# Patient Record
Sex: MALE | Race: WHITE | Employment: OTHER | URBAN - NONMETROPOLITAN AREA
[De-identification: names, ages, dates, MRNs, and addresses within clinical notes are randomized per-mention and may not be internally consistent; named-entity substitution may affect disease eponyms.]

---

## 2019-05-13 ENCOUNTER — OFFICE VISIT (OUTPATIENT)
Dept: CARDIOLOGY CLINIC | Age: 59
End: 2019-05-13
Payer: COMMERCIAL

## 2019-05-13 VITALS
BODY MASS INDEX: 42.66 KG/M2 | DIASTOLIC BLOOD PRESSURE: 90 MMHG | WEIGHT: 315 LBS | HEART RATE: 88 BPM | SYSTOLIC BLOOD PRESSURE: 140 MMHG | HEIGHT: 72 IN

## 2019-05-13 DIAGNOSIS — R94.39 ABNORMAL STRESS TEST: ICD-10-CM

## 2019-05-13 DIAGNOSIS — R06.09 DYSPNEA ON EXERTION: Primary | ICD-10-CM

## 2019-05-13 DIAGNOSIS — I10 ESSENTIAL HYPERTENSION: ICD-10-CM

## 2019-05-13 PROCEDURE — 3017F COLORECTAL CA SCREEN DOC REV: CPT | Performed by: NUCLEAR MEDICINE

## 2019-05-13 PROCEDURE — G8428 CUR MEDS NOT DOCUMENT: HCPCS | Performed by: NUCLEAR MEDICINE

## 2019-05-13 PROCEDURE — G8417 CALC BMI ABV UP PARAM F/U: HCPCS | Performed by: NUCLEAR MEDICINE

## 2019-05-13 PROCEDURE — 4004F PT TOBACCO SCREEN RCVD TLK: CPT | Performed by: NUCLEAR MEDICINE

## 2019-05-13 PROCEDURE — 99204 OFFICE O/P NEW MOD 45 MIN: CPT | Performed by: NUCLEAR MEDICINE

## 2019-05-13 ASSESSMENT — ENCOUNTER SYMPTOMS
PHOTOPHOBIA: 0
ANAL BLEEDING: 0
VOMITING: 0
DIARRHEA: 0
CHEST TIGHTNESS: 0
BLOOD IN STOOL: 0
BACK PAIN: 0
COLOR CHANGE: 0
RECTAL PAIN: 0
ABDOMINAL DISTENTION: 0
NAUSEA: 0
ABDOMINAL PAIN: 0
SHORTNESS OF BREATH: 1
CONSTIPATION: 0

## 2019-05-13 NOTE — PROGRESS NOTES
New pt here for abn stress test   Pt states he gets some chest pain, does not radiate   Also gets some SOB more on exertion
guarding. No hernia. Musculoskeletal: He exhibits no edema. Lymphadenopathy:     He has no cervical adenopathy. Neurological: He is alert and oriented to person, place, and time. He displays normal reflexes. No cranial nerve deficit or sensory deficit. He exhibits normal muscle tone. Coordination normal.   Skin: He is not diaphoretic. No erythema. Psychiatric: He has a normal mood and affect. His behavior is normal.     BP (!) 140/90   Pulse 88   Ht 6' (1.829 m)   Wt (!) 358 lb (162.4 kg)   BMI 48.55 kg/m²     Assessment:      Diagnosis Orders   1. Dyspnea on exertion     2. Abnormal stress test     3. Essential hypertension     possible CAD  Vs likely sleep apnea   Obesity: extensive discussion was made with the patient regarding diet and weight control including specific food items to avoid and cut down  BP needs treated     Plan:  No follow-ups on file. Discussed  Add beta blockers  ASA  Cardiac cathterizaion, risks and benefits discussed with the patient and family at length. Patient was agreeable. Refer for sleep apnea evaluation   Continue risk factor modification and medical management  Thank you for allowing me to participate in the care of your patient. Please don't hesitate to contact me regarding any further issues related to the patient care    Orders Placed:  No orders of the defined types were placed in this encounter. Medications Prescribed:  No orders of the defined types were placed in this encounter. Discussed use, benefit, and side effects of prescribed medications. All patient questions answered. Pt voicedunderstanding. Instructed to continue current medications, diet and exercise. Continue risk factor modification and medical management. Patient agreed with treatment plan. Follow up as directed.     Electronically signedby Zahraa Gentile MD on 5/13/2019 at 12:19 PM

## 2019-05-22 ENCOUNTER — PREP FOR PROCEDURE (OUTPATIENT)
Dept: CARDIOLOGY | Age: 59
End: 2019-05-22

## 2019-05-22 RX ORDER — SODIUM CHLORIDE 0.9 % (FLUSH) 0.9 %
10 SYRINGE (ML) INJECTION EVERY 12 HOURS SCHEDULED
Status: CANCELLED | OUTPATIENT
Start: 2019-05-22

## 2019-05-22 RX ORDER — NITROGLYCERIN 0.4 MG/1
0.4 TABLET SUBLINGUAL EVERY 5 MIN PRN
Status: CANCELLED | OUTPATIENT
Start: 2019-05-22

## 2019-05-22 RX ORDER — SODIUM CHLORIDE 0.9 % (FLUSH) 0.9 %
10 SYRINGE (ML) INJECTION PRN
Status: CANCELLED | OUTPATIENT
Start: 2019-05-22

## 2019-05-22 RX ORDER — DIPHENHYDRAMINE HYDROCHLORIDE 50 MG/ML
50 INJECTION INTRAMUSCULAR; INTRAVENOUS ONCE
Status: CANCELLED | OUTPATIENT
Start: 2019-05-22 | End: 2019-05-22

## 2019-05-22 RX ORDER — SODIUM CHLORIDE 9 MG/ML
INJECTION, SOLUTION INTRAVENOUS CONTINUOUS
Status: CANCELLED | OUTPATIENT
Start: 2019-05-22

## 2019-05-22 RX ORDER — ASPIRIN 325 MG
325 TABLET ORAL ONCE
Status: CANCELLED | OUTPATIENT
Start: 2019-05-22 | End: 2019-05-22

## 2019-05-23 ENCOUNTER — HOSPITAL ENCOUNTER (OUTPATIENT)
Dept: INPATIENT UNIT | Age: 59
Discharge: HOME | End: 2019-05-23
Attending: NUCLEAR MEDICINE | Admitting: NUCLEAR MEDICINE
Payer: COMMERCIAL

## 2019-05-23 VITALS
HEART RATE: 76 BPM | HEIGHT: 72 IN | SYSTOLIC BLOOD PRESSURE: 133 MMHG | BODY MASS INDEX: 42.66 KG/M2 | TEMPERATURE: 98 F | OXYGEN SATURATION: 94 % | DIASTOLIC BLOOD PRESSURE: 81 MMHG | RESPIRATION RATE: 16 BRPM | WEIGHT: 315 LBS

## 2019-05-23 LAB
ABO: NORMAL
ANION GAP SERPL CALCULATED.3IONS-SCNC: 10 MEQ/L (ref 8–16)
ANTIBODY SCREEN: NORMAL
APTT: 29 SECONDS (ref 22–38)
BUN BLDV-MCNC: 23 MG/DL (ref 7–22)
CALCIUM SERPL-MCNC: 9.8 MG/DL (ref 8.5–10.5)
CHLORIDE BLD-SCNC: 98 MEQ/L (ref 98–111)
CO2: 28 MEQ/L (ref 23–33)
CREAT SERPL-MCNC: 0.8 MG/DL (ref 0.4–1.2)
EKG ATRIAL RATE: 64 BPM
EKG P AXIS: 25 DEGREES
EKG P-R INTERVAL: 230 MS
EKG Q-T INTERVAL: 404 MS
EKG QRS DURATION: 112 MS
EKG QTC CALCULATION (BAZETT): 416 MS
EKG R AXIS: -31 DEGREES
EKG T AXIS: 19 DEGREES
EKG VENTRICULAR RATE: 64 BPM
ERYTHROCYTE [DISTWIDTH] IN BLOOD BY AUTOMATED COUNT: 12.7 % (ref 11.5–14.5)
ERYTHROCYTE [DISTWIDTH] IN BLOOD BY AUTOMATED COUNT: 39.8 FL (ref 35–45)
GFR SERPL CREATININE-BSD FRML MDRD: > 90 ML/MIN/1.73M2
GLUCOSE BLD-MCNC: 237 MG/DL (ref 70–108)
GLUCOSE BLD-MCNC: 322 MG/DL (ref 70–108)
HCT VFR BLD CALC: 48.7 % (ref 42–52)
HEMOGLOBIN: 16.1 GM/DL (ref 14–18)
INR BLD: 0.95 (ref 0.85–1.13)
MCH RBC QN AUTO: 28.6 PG (ref 26–33)
MCHC RBC AUTO-ENTMCNC: 33.1 GM/DL (ref 32.2–35.5)
MCV RBC AUTO: 86.5 FL (ref 80–94)
PLATELET # BLD: 175 THOU/MM3 (ref 130–400)
PMV BLD AUTO: 10.5 FL (ref 9.4–12.4)
POTASSIUM REFLEX MAGNESIUM: 5.7 MEQ/L (ref 3.5–5.2)
RBC # BLD: 5.63 MILL/MM3 (ref 4.7–6.1)
RH FACTOR: NORMAL
SODIUM BLD-SCNC: 136 MEQ/L (ref 135–145)
WBC # BLD: 11.1 THOU/MM3 (ref 4.8–10.8)

## 2019-05-23 PROCEDURE — 93458 L HRT ARTERY/VENTRICLE ANGIO: CPT | Performed by: NUCLEAR MEDICINE

## 2019-05-23 PROCEDURE — 6360000004 HC RX CONTRAST MEDICATION: Performed by: NUCLEAR MEDICINE

## 2019-05-23 PROCEDURE — 36415 COLL VENOUS BLD VENIPUNCTURE: CPT

## 2019-05-23 PROCEDURE — 85610 PROTHROMBIN TIME: CPT

## 2019-05-23 PROCEDURE — 85027 COMPLETE CBC AUTOMATED: CPT

## 2019-05-23 PROCEDURE — 86850 RBC ANTIBODY SCREEN: CPT

## 2019-05-23 PROCEDURE — 6360000002 HC RX W HCPCS

## 2019-05-23 PROCEDURE — 6370000000 HC RX 637 (ALT 250 FOR IP): Performed by: NUCLEAR MEDICINE

## 2019-05-23 PROCEDURE — 93005 ELECTROCARDIOGRAM TRACING: CPT | Performed by: NURSE PRACTITIONER

## 2019-05-23 PROCEDURE — 85730 THROMBOPLASTIN TIME PARTIAL: CPT

## 2019-05-23 PROCEDURE — 2709999900 HC NON-CHARGEABLE SUPPLY

## 2019-05-23 PROCEDURE — 2580000003 HC RX 258: Performed by: NURSE PRACTITIONER

## 2019-05-23 PROCEDURE — 86901 BLOOD TYPING SEROLOGIC RH(D): CPT

## 2019-05-23 PROCEDURE — C1894 INTRO/SHEATH, NON-LASER: HCPCS

## 2019-05-23 PROCEDURE — 82948 REAGENT STRIP/BLOOD GLUCOSE: CPT

## 2019-05-23 PROCEDURE — 86900 BLOOD TYPING SEROLOGIC ABO: CPT

## 2019-05-23 PROCEDURE — C1769 GUIDE WIRE: HCPCS

## 2019-05-23 PROCEDURE — 80048 BASIC METABOLIC PNL TOTAL CA: CPT

## 2019-05-23 PROCEDURE — 2500000003 HC RX 250 WO HCPCS

## 2019-05-23 PROCEDURE — 93010 ELECTROCARDIOGRAM REPORT: CPT | Performed by: INTERNAL MEDICINE

## 2019-05-23 RX ORDER — SODIUM CHLORIDE 9 MG/ML
INJECTION, SOLUTION INTRAVENOUS CONTINUOUS
Status: DISCONTINUED | OUTPATIENT
Start: 2019-05-23 | End: 2019-05-24 | Stop reason: HOSPADM

## 2019-05-23 RX ORDER — SODIUM CHLORIDE 0.9 % (FLUSH) 0.9 %
10 SYRINGE (ML) INJECTION PRN
Status: DISCONTINUED | OUTPATIENT
Start: 2019-05-23 | End: 2019-05-23 | Stop reason: SDUPTHER

## 2019-05-23 RX ORDER — SODIUM CHLORIDE 0.9 % (FLUSH) 0.9 %
10 SYRINGE (ML) INJECTION PRN
Status: DISCONTINUED | OUTPATIENT
Start: 2019-05-23 | End: 2019-05-24 | Stop reason: HOSPADM

## 2019-05-23 RX ORDER — DIPHENHYDRAMINE HYDROCHLORIDE 50 MG/ML
50 INJECTION INTRAMUSCULAR; INTRAVENOUS ONCE
Status: DISCONTINUED | OUTPATIENT
Start: 2019-05-23 | End: 2019-05-23

## 2019-05-23 RX ORDER — ATROPINE SULFATE 0.4 MG/ML
0.5 AMPUL (ML) INJECTION
Status: DISCONTINUED | OUTPATIENT
Start: 2019-05-23 | End: 2019-05-23 | Stop reason: HOSPADM

## 2019-05-23 RX ORDER — SODIUM CHLORIDE 9 MG/ML
INJECTION, SOLUTION INTRAVENOUS CONTINUOUS
Status: DISCONTINUED | OUTPATIENT
Start: 2019-05-23 | End: 2019-05-23 | Stop reason: SDUPTHER

## 2019-05-23 RX ORDER — SODIUM CHLORIDE 0.9 % (FLUSH) 0.9 %
10 SYRINGE (ML) INJECTION EVERY 12 HOURS SCHEDULED
Status: DISCONTINUED | OUTPATIENT
Start: 2019-05-23 | End: 2019-05-23 | Stop reason: SDUPTHER

## 2019-05-23 RX ORDER — NITROGLYCERIN 0.4 MG/1
0.4 TABLET SUBLINGUAL EVERY 5 MIN PRN
Status: DISCONTINUED | OUTPATIENT
Start: 2019-05-23 | End: 2019-05-24 | Stop reason: HOSPADM

## 2019-05-23 RX ORDER — ASPIRIN 325 MG
325 TABLET ORAL ONCE
Status: DISCONTINUED | OUTPATIENT
Start: 2019-05-23 | End: 2019-05-24 | Stop reason: HOSPADM

## 2019-05-23 RX ORDER — SODIUM CHLORIDE 0.9 % (FLUSH) 0.9 %
10 SYRINGE (ML) INJECTION EVERY 12 HOURS SCHEDULED
Status: DISCONTINUED | OUTPATIENT
Start: 2019-05-23 | End: 2019-05-24 | Stop reason: HOSPADM

## 2019-05-23 RX ORDER — ACETAMINOPHEN 325 MG/1
650 TABLET ORAL EVERY 4 HOURS PRN
Status: DISCONTINUED | OUTPATIENT
Start: 2019-05-23 | End: 2019-05-24 | Stop reason: HOSPADM

## 2019-05-23 RX ADMIN — IOPAMIDOL 100 ML: 755 INJECTION, SOLUTION INTRAVENOUS at 16:39

## 2019-05-23 RX ADMIN — INSULIN LISPRO 4 UNITS: 100 INJECTION, SOLUTION INTRAVENOUS; SUBCUTANEOUS at 15:50

## 2019-05-23 RX ADMIN — SODIUM CHLORIDE: 9 INJECTION, SOLUTION INTRAVENOUS at 14:45

## 2019-05-23 ASSESSMENT — PAIN SCALES - GENERAL: PAINLEVEL_OUTOF10: 0

## 2019-05-23 NOTE — OP NOTE
[address] [address]  Sedation/Analgesia Post Sedation Record    Pt Name: Chad Moore  Account number: [de-identified]  MRN: [id number]  YOB: 1960  Procedure Performed By: Uziel Lawson MD [facility]  Primary Care Physician: Harjit Pena MD, MD  Date: 5/23/2019    POST-PROCEDURE    Physicians/Assistants: Uziel Lawson MD [facility]  Procedure Performed: cath    Sedation/Anesthesia: Versed/ Fentanyl and 2% xylocaine local anesthesia. Estimated Blood Loss: < 10 ml.    Specimens Removed: None       Disposition of Specimen: N/A      Complications: None Immediate      Post-procedure Diagnosis/Findings: see chart              Recommendations: medical RX              Uziel Lawson MD [facility]  Electronically signed 5/23/2019 at 5:34 PM

## 2019-05-23 NOTE — PROGRESS NOTES
Pt admitted to  2E09 ambulatory for left heart cath. Pt NPO. Patient accompanied by spouse. Vital signs obtained. Assessment and data collection initiated. Oriented to room. Policies and procedures for 2E explained   All questions answered with no further questions at this time. Fall prevention and safety precautions discussed with patient. Care plan reviewed with patient and family. Patient and family verbalize understanding of the plan of care and contribute to goal setting. 1500 Dr Naz eBnitez given all results of blood work. Glucose level reviewed with Dr Naz Benitez and the pt and wife.  States they \"will make appointment with family doctor, Luis Velázquez, for tomorrow\"      [phone] to lab per bed

## 2019-05-23 NOTE — FLOWSHEET NOTE
Received in Cath Recovery. Report received from Cath Lab. Right femoral site firm, tender to touch, manual pressure applied for 15 min per S Kallay. .   IV 1000 0.9 NS infusing at 75 ml per hour. Monitor showing NSR. See Post Cath Assessment flowsheet.   Wife in to see

## 2019-05-24 NOTE — PROCEDURES
[address] [place] [address]                            CARDIAC CATHETERIZATION    PATIENT NAME: Modesto Saab                        :        1960  MED REC NO:   [id number]                           ROOM:       0009  ACCOUNT NO:   [de-identified]                           ADMIT DATE: 2019  PROVIDER:     Vero Brandt M.D.      DATE OF PROCEDURE:  2019    CLINICAL HISTORY AND INDICATION:  This is a pleasant gentleman who has  history of multiple risk factors for coronary artery disease,  hypertension, undiagnosed diabetes noted today with a blood sugar of  300, history of abnormal stress test, which was not addressed about a  year ago with lateral ischemia, symptoms of dyspnea, dyspnea on  exertion, morbid obesity, chest heaviness, referred for cardiac  catheterization to evaluate coronary anatomy. PROCEDURES PERFORMED:  1. Left heart cath, LV gram.  2.  Coronary angiogram, right and left. 3.  Sedation with 2 mg of Versed and 50 mcg of fentanyl between 04:00  and 04:30 p.m. in my presence and under my supervision. PROCEDURE DETAILS:  Please refer to my catheterization detailed note. HEMODYNAMIC RESULTS AND LEFT VENTRICULOGRAM:  Left ventricular  end-diastolic pressure was 12 mmHg. No significant change before and  after contrast injection. No significant gradient across the aortic  valve to signify aortic stenosis. Left ventricular function was  globally within normal limit. EF 55%. CORONARY ANGIOGRAM RESULTS:  1. Left main is patent, gives rise to left anterior descending and left  circumflex artery. 2.  Left anterior descending artery is pretty patent with no significant  stenosis. 3.  Left circumflex artery is a large size codominant and is patent. 4.  Right coronary artery is codominant, patent with no significant  disease. CONCLUSION:  1. No significant coronary artery disease noted.   2.  Normal IV systolic function. 3.  No complications. RECOMMENDATIONS:  Medical treatment and risk factor modification is  recommended. The patient was advised to follow up with the family  physicians soon to evaluate his diabetes mellitus and proceed  accordingly. He would benefit from continued risk factor modification. He should be considered for evaluation for possible sleep apnea given  his morbid obesity and above-mentioned symptoms.         Jesús Silva M.D.    D: 05/23/2019 17:36:32       T: 05/23/2019 19:12:25     [email or web address]  Job#: [id number]     Doc#: [id number]    CC:

## 2019-09-09 ENCOUNTER — TELEPHONE (OUTPATIENT)
Dept: CARDIOLOGY CLINIC | Age: 59
End: 2019-09-09

## 2019-11-05 ENCOUNTER — HOSPITAL ENCOUNTER (OUTPATIENT)
Dept: INFUSION THERAPY | Age: 59
Discharge: HOME | End: 2019-11-05
Payer: COMMERCIAL

## 2019-11-05 ENCOUNTER — OFFICE VISIT (OUTPATIENT)
Dept: ONCOLOGY | Age: 59
End: 2019-11-05
Payer: COMMERCIAL

## 2019-11-05 VITALS
DIASTOLIC BLOOD PRESSURE: 89 MMHG | BODY MASS INDEX: 42.66 KG/M2 | TEMPERATURE: 97.8 F | WEIGHT: 315 LBS | HEIGHT: 72 IN | HEART RATE: 74 BPM | SYSTOLIC BLOOD PRESSURE: 142 MMHG | RESPIRATION RATE: 18 BRPM | OXYGEN SATURATION: 95 %

## 2019-11-05 DIAGNOSIS — E11.9 TYPE II DIABETES MELLITUS, WELL CONTROLLED (HCC): ICD-10-CM

## 2019-11-05 DIAGNOSIS — E66.9 OBESITY, UNSPECIFIED CLASSIFICATION, UNSPECIFIED OBESITY TYPE, UNSPECIFIED WHETHER SERIOUS COMORBIDITY PRESENT: ICD-10-CM

## 2019-11-05 DIAGNOSIS — C82.58 DIFFUSE FOLLICLE CENTER LYMPHOMA OF LYMPH NODES OF MULTIPLE REGIONS (HCC): Primary | ICD-10-CM

## 2019-11-05 DIAGNOSIS — C82.56 DIFFUSE FOLLICLE CENTER LYMPHOMA OF INTRAPELVIC LYMPH NODES (HCC): ICD-10-CM

## 2019-11-05 LAB
ALBUMIN SERPL-MCNC: 4.4 G/DL (ref 3.5–5.1)
ALP BLD-CCNC: 55 U/L (ref 38–126)
ALT SERPL-CCNC: 27 U/L (ref 11–66)
ANION GAP SERPL CALCULATED.3IONS-SCNC: 15 MEQ/L (ref 8–16)
AST SERPL-CCNC: 32 U/L (ref 5–40)
BASOPHILS # BLD: 0.9 %
BASOPHILS ABSOLUTE: 0.1 THOU/MM3 (ref 0–0.1)
BILIRUB SERPL-MCNC: 0.8 MG/DL (ref 0.3–1.2)
BILIRUBIN DIRECT: < 0.2 MG/DL (ref 0–0.3)
BUN BLDV-MCNC: 22 MG/DL (ref 7–22)
CALCIUM SERPL-MCNC: 9.7 MG/DL (ref 8.5–10.5)
CHLORIDE BLD-SCNC: 97 MEQ/L (ref 98–111)
CO2: 25 MEQ/L (ref 23–33)
CREAT SERPL-MCNC: 1 MG/DL (ref 0.4–1.2)
EOSINOPHIL # BLD: 3.8 %
EOSINOPHILS ABSOLUTE: 0.4 THOU/MM3 (ref 0–0.4)
ERYTHROCYTE [DISTWIDTH] IN BLOOD BY AUTOMATED COUNT: 13.5 % (ref 11.5–14.5)
ERYTHROCYTE [DISTWIDTH] IN BLOOD BY AUTOMATED COUNT: 43.4 FL (ref 35–45)
GFR SERPL CREATININE-BSD FRML MDRD: 76 ML/MIN/1.73M2
GLUCOSE BLD-MCNC: 192 MG/DL (ref 70–108)
HCT VFR BLD CALC: 48.6 % (ref 42–52)
HEMOGLOBIN: 15.9 GM/DL (ref 14–18)
IMMATURE GRANS (ABS): 0.02 THOU/MM3 (ref 0–0.07)
IMMATURE GRANULOCYTES: 0.2 %
LYMPHOCYTES # BLD: 25.4 %
LYMPHOCYTES ABSOLUTE: 2.4 THOU/MM3 (ref 1–4.8)
MAGNESIUM: 2.1 MG/DL (ref 1.6–2.4)
MCH RBC QN AUTO: 28.9 PG (ref 26–33)
MCHC RBC AUTO-ENTMCNC: 32.7 GM/DL (ref 32.2–35.5)
MCV RBC AUTO: 88.2 FL (ref 80–94)
MONOCYTES # BLD: 6.2 %
MONOCYTES ABSOLUTE: 0.6 THOU/MM3 (ref 0.4–1.3)
NUCLEATED RED BLOOD CELLS: 0 /100 WBC
PLATELET # BLD: 203 THOU/MM3 (ref 130–400)
PMV BLD AUTO: 10.3 FL (ref 9.4–12.4)
POTASSIUM SERPL-SCNC: 4.6 MEQ/L (ref 3.5–5.2)
RBC # BLD: 5.51 MILL/MM3 (ref 4.7–6.1)
SEDIMENTATION RATE, ERYTHROCYTE: 6 MM/HR (ref 0–10)
SEG NEUTROPHILS: 63.5 %
SEGMENTED NEUTROPHILS ABSOLUTE COUNT: 5.9 THOU/MM3 (ref 1.8–7.7)
SODIUM BLD-SCNC: 137 MEQ/L (ref 135–145)
TOTAL PROTEIN: 8.1 G/DL (ref 6.1–8)
WBC # BLD: 9.3 THOU/MM3 (ref 4.8–10.8)

## 2019-11-05 PROCEDURE — 85025 COMPLETE CBC W/AUTO DIFF WBC: CPT

## 2019-11-05 PROCEDURE — G8598 ASA/ANTIPLAT THER USED: HCPCS | Performed by: INTERNAL MEDICINE

## 2019-11-05 PROCEDURE — 83615 LACTATE (LD) (LDH) ENZYME: CPT

## 2019-11-05 PROCEDURE — 82248 BILIRUBIN DIRECT: CPT

## 2019-11-05 PROCEDURE — 83735 ASSAY OF MAGNESIUM: CPT

## 2019-11-05 PROCEDURE — G8484 FLU IMMUNIZE NO ADMIN: HCPCS | Performed by: INTERNAL MEDICINE

## 2019-11-05 PROCEDURE — G8417 CALC BMI ABV UP PARAM F/U: HCPCS | Performed by: INTERNAL MEDICINE

## 2019-11-05 PROCEDURE — 1036F TOBACCO NON-USER: CPT | Performed by: INTERNAL MEDICINE

## 2019-11-05 PROCEDURE — 2022F DILAT RTA XM EVC RTNOPTHY: CPT | Performed by: INTERNAL MEDICINE

## 2019-11-05 PROCEDURE — 36415 COLL VENOUS BLD VENIPUNCTURE: CPT

## 2019-11-05 PROCEDURE — 99211 OFF/OP EST MAY X REQ PHY/QHP: CPT

## 2019-11-05 PROCEDURE — 3046F HEMOGLOBIN A1C LEVEL >9.0%: CPT | Performed by: INTERNAL MEDICINE

## 2019-11-05 PROCEDURE — 99205 OFFICE O/P NEW HI 60 MIN: CPT | Performed by: INTERNAL MEDICINE

## 2019-11-05 PROCEDURE — 80053 COMPREHEN METABOLIC PANEL: CPT

## 2019-11-05 PROCEDURE — G8427 DOCREV CUR MEDS BY ELIG CLIN: HCPCS | Performed by: INTERNAL MEDICINE

## 2019-11-05 PROCEDURE — 3017F COLORECTAL CA SCREEN DOC REV: CPT | Performed by: INTERNAL MEDICINE

## 2019-11-05 PROCEDURE — 85651 RBC SED RATE NONAUTOMATED: CPT

## 2019-11-06 LAB — LD: 162 U/L (ref 100–190)

## 2019-11-25 DIAGNOSIS — C82.56 DIFFUSE FOLLICLE CENTER LYMPHOMA OF INTRAPELVIC LYMPH NODES (HCC): ICD-10-CM

## 2019-11-26 ENCOUNTER — HOSPITAL ENCOUNTER (OUTPATIENT)
Dept: INFUSION THERAPY | Age: 59
Discharge: HOME | End: 2019-11-26
Payer: COMMERCIAL

## 2019-11-26 ENCOUNTER — OFFICE VISIT (OUTPATIENT)
Dept: ONCOLOGY | Age: 59
End: 2019-11-26
Payer: COMMERCIAL

## 2019-11-26 VITALS
HEART RATE: 82 BPM | RESPIRATION RATE: 18 BRPM | DIASTOLIC BLOOD PRESSURE: 94 MMHG | OXYGEN SATURATION: 94 % | TEMPERATURE: 98.5 F | WEIGHT: 315 LBS | HEIGHT: 72 IN | SYSTOLIC BLOOD PRESSURE: 142 MMHG | BODY MASS INDEX: 42.66 KG/M2

## 2019-11-26 DIAGNOSIS — C82.58 DIFFUSE FOLLICLE CENTER LYMPHOMA OF LYMPH NODES OF MULTIPLE REGIONS (HCC): ICD-10-CM

## 2019-11-26 DIAGNOSIS — Z51.11 ENCOUNTER FOR CHEMOTHERAPY MANAGEMENT: ICD-10-CM

## 2019-11-26 PROCEDURE — 99215 OFFICE O/P EST HI 40 MIN: CPT | Performed by: INTERNAL MEDICINE

## 2019-11-26 PROCEDURE — G8484 FLU IMMUNIZE NO ADMIN: HCPCS | Performed by: INTERNAL MEDICINE

## 2019-11-26 PROCEDURE — 1036F TOBACCO NON-USER: CPT | Performed by: INTERNAL MEDICINE

## 2019-11-26 PROCEDURE — 99211 OFF/OP EST MAY X REQ PHY/QHP: CPT

## 2019-11-26 PROCEDURE — G8417 CALC BMI ABV UP PARAM F/U: HCPCS | Performed by: INTERNAL MEDICINE

## 2019-11-26 PROCEDURE — G8427 DOCREV CUR MEDS BY ELIG CLIN: HCPCS | Performed by: INTERNAL MEDICINE

## 2019-11-26 PROCEDURE — G8598 ASA/ANTIPLAT THER USED: HCPCS | Performed by: INTERNAL MEDICINE

## 2019-11-26 PROCEDURE — 3017F COLORECTAL CA SCREEN DOC REV: CPT | Performed by: INTERNAL MEDICINE

## 2019-12-02 ENCOUNTER — HOSPITAL ENCOUNTER (OUTPATIENT)
Dept: INFUSION THERAPY | Age: 59
Discharge: HOME | End: 2019-12-02
Payer: COMMERCIAL

## 2019-12-02 PROCEDURE — 99212 OFFICE O/P EST SF 10 MIN: CPT

## 2019-12-02 RX ORDER — HEPARIN SODIUM (PORCINE) LOCK FLUSH IV SOLN 100 UNIT/ML 100 UNIT/ML
500 SOLUTION INTRAVENOUS PRN
Status: CANCELLED | OUTPATIENT
Start: 2019-12-02

## 2019-12-02 RX ORDER — SODIUM CHLORIDE 0.9 % (FLUSH) 0.9 %
20 SYRINGE (ML) INJECTION PRN
Status: CANCELLED | OUTPATIENT
Start: 2019-12-02

## 2019-12-02 RX ORDER — SODIUM CHLORIDE 0.9 % (FLUSH) 0.9 %
10 SYRINGE (ML) INJECTION PRN
Status: CANCELLED | OUTPATIENT
Start: 2019-12-02

## 2019-12-02 NOTE — PLAN OF CARE
Care plan reviewed with patient. Patient verbalized understanding of the plan of care and contribute to goal setting. Problem: Intellectual/Education/Knowledge Deficit  Goal: Teaching initiated upon admission  Outcome: Met This Shift  Note:   Patient verbalizes understanding to verbal information given on Rituxan and bendeka,action and possible side effects. Aware to call MD if develop complications. Intervention: Verbal/written education provided  Note:   Chemotherapy Teaching     What is Chemotherapy   Drug action [x]   Method of Administration [x]   Handouts given []     Side Effects  Nausea/vomiting [x]   Diarrhea [x]   Fatigue [x]   Signs / Symptoms of infection [x]   Neutropenia [x]   Thrombocytopenia [x]   Alopecia [x]   neuropathy [x]   [place] diet &  the importance of fluids [x]       Micellaneous  Importance of nutrition [x]   Importance of oral hygiene [x]   When to call the MD [x]   Monitoring labs [x]   Use of supportive services []     Explanation of Drug Regimen / Frequency  Rituxan and Bendeka     Comments  Verbalized understanding to drug,action,side effects and when to call MD         Problem: Discharge Planning  Goal: Knowledge of discharge instructions  Description  Knowledge of discharge instructions     Outcome: Met This Shift  Note:   Verbalized understanding of discharge instructions, follow ups and when to call doctor   Intervention: Discharge to appropriate level of care  Note:   Discuss discharge instructions, follow ups and when to call doctor.

## 2019-12-02 NOTE — PROGRESS NOTES
Patient and family instructed on chemotherapy specifically the drugs Rituxan and bendeka and  chemotherapy regimen. The [employer] along with the following - chemotherapy drug information sheets,chemotherapy side effects handouts,Understanding your blood counts, [place] diet,Importance to hydration,when to call physician sheet,reduce risk infection sheet,bleeding precaution,neutropenia precaution, All questions answered satisfactory and support given. Patient given a tour of facility. Approximately 45 minutes spent with patient and family.

## 2019-12-03 ENCOUNTER — TELEPHONE (OUTPATIENT)
Dept: ONCOLOGY | Age: 59
End: 2019-12-03

## 2019-12-09 DIAGNOSIS — C82.58 DIFFUSE FOLLICLE CENTER LYMPHOMA OF LYMPH NODES OF MULTIPLE REGIONS (HCC): Primary | ICD-10-CM

## 2019-12-09 RX ORDER — DEXAMETHASONE SODIUM PHOSPHATE 4 MG/ML
8 INJECTION, SOLUTION INTRA-ARTICULAR; INTRALESIONAL; INTRAMUSCULAR; INTRAVENOUS; SOFT TISSUE ONCE
Status: CANCELLED | OUTPATIENT
Start: 2019-12-11

## 2019-12-09 RX ORDER — METHYLPREDNISOLONE SODIUM SUCCINATE 125 MG/2ML
125 INJECTION, POWDER, LYOPHILIZED, FOR SOLUTION INTRAMUSCULAR; INTRAVENOUS ONCE
Status: CANCELLED | OUTPATIENT
Start: 2020-01-07

## 2019-12-09 RX ORDER — SODIUM CHLORIDE 9 MG/ML
INJECTION, SOLUTION INTRAVENOUS CONTINUOUS
Status: CANCELLED | OUTPATIENT
Start: 2020-01-08

## 2019-12-09 RX ORDER — SODIUM CHLORIDE 0.9 % (FLUSH) 0.9 %
10 SYRINGE (ML) INJECTION PRN
Status: CANCELLED | OUTPATIENT
Start: 2020-01-07

## 2019-12-09 RX ORDER — HEPARIN SODIUM (PORCINE) LOCK FLUSH IV SOLN 100 UNIT/ML 100 UNIT/ML
500 SOLUTION INTRAVENOUS PRN
Status: CANCELLED | OUTPATIENT
Start: 2020-01-07

## 2019-12-09 RX ORDER — SODIUM CHLORIDE 0.9 % (FLUSH) 0.9 %
10 SYRINGE (ML) INJECTION PRN
Status: CANCELLED | OUTPATIENT
Start: 2019-12-11

## 2019-12-09 RX ORDER — METHYLPREDNISOLONE SODIUM SUCCINATE 125 MG/2ML
125 INJECTION, POWDER, LYOPHILIZED, FOR SOLUTION INTRAMUSCULAR; INTRAVENOUS ONCE
Status: CANCELLED | OUTPATIENT
Start: 2020-01-08

## 2019-12-09 RX ORDER — MEPERIDINE HYDROCHLORIDE 25 MG/ML
12.5 INJECTION INTRAMUSCULAR; INTRAVENOUS; SUBCUTANEOUS ONCE
Status: CANCELLED | OUTPATIENT
Start: 2020-01-07

## 2019-12-09 RX ORDER — DIPHENHYDRAMINE HYDROCHLORIDE 50 MG/ML
50 INJECTION INTRAMUSCULAR; INTRAVENOUS ONCE
Status: CANCELLED | OUTPATIENT
Start: 2019-12-11

## 2019-12-09 RX ORDER — SODIUM CHLORIDE 9 MG/ML
20 INJECTION, SOLUTION INTRAVENOUS ONCE
Status: CANCELLED | OUTPATIENT
Start: 2019-12-11

## 2019-12-09 RX ORDER — SODIUM CHLORIDE 0.9 % (FLUSH) 0.9 %
5 SYRINGE (ML) INJECTION PRN
Status: CANCELLED | OUTPATIENT
Start: 2020-01-07

## 2019-12-09 RX ORDER — METHYLPREDNISOLONE SODIUM SUCCINATE 125 MG/2ML
125 INJECTION, POWDER, LYOPHILIZED, FOR SOLUTION INTRAMUSCULAR; INTRAVENOUS ONCE
Status: CANCELLED | OUTPATIENT
Start: 2019-12-10

## 2019-12-09 RX ORDER — ACETAMINOPHEN 325 MG/1
650 TABLET ORAL ONCE
Status: CANCELLED | OUTPATIENT
Start: 2020-01-07

## 2019-12-09 RX ORDER — SODIUM CHLORIDE 9 MG/ML
INJECTION, SOLUTION INTRAVENOUS CONTINUOUS
Status: CANCELLED | OUTPATIENT
Start: 2020-01-07

## 2019-12-09 RX ORDER — DIPHENHYDRAMINE HYDROCHLORIDE 50 MG/ML
50 INJECTION INTRAMUSCULAR; INTRAVENOUS ONCE
Status: CANCELLED | OUTPATIENT
Start: 2020-01-07

## 2019-12-09 RX ORDER — SODIUM CHLORIDE 0.9 % (FLUSH) 0.9 %
10 SYRINGE (ML) INJECTION PRN
Status: CANCELLED | OUTPATIENT
Start: 2020-01-08

## 2019-12-09 RX ORDER — SODIUM CHLORIDE 0.9 % (FLUSH) 0.9 %
5 SYRINGE (ML) INJECTION PRN
Status: CANCELLED | OUTPATIENT
Start: 2020-01-08

## 2019-12-09 RX ORDER — HEPARIN SODIUM (PORCINE) LOCK FLUSH IV SOLN 100 UNIT/ML 100 UNIT/ML
500 SOLUTION INTRAVENOUS PRN
Status: CANCELLED | OUTPATIENT
Start: 2020-01-08

## 2019-12-09 RX ORDER — HEPARIN SODIUM (PORCINE) LOCK FLUSH IV SOLN 100 UNIT/ML 100 UNIT/ML
500 SOLUTION INTRAVENOUS PRN
Status: CANCELLED | OUTPATIENT
Start: 2019-12-11

## 2019-12-09 RX ORDER — SODIUM CHLORIDE 9 MG/ML
INJECTION, SOLUTION INTRAVENOUS CONTINUOUS
Status: CANCELLED | OUTPATIENT
Start: 2019-12-10

## 2019-12-09 RX ORDER — SODIUM CHLORIDE 9 MG/ML
20 INJECTION, SOLUTION INTRAVENOUS ONCE
Status: CANCELLED | OUTPATIENT
Start: 2020-01-07

## 2019-12-09 RX ORDER — DEXAMETHASONE SODIUM PHOSPHATE 4 MG/ML
8 INJECTION, SOLUTION INTRA-ARTICULAR; INTRALESIONAL; INTRAMUSCULAR; INTRAVENOUS; SOFT TISSUE ONCE
Status: CANCELLED | OUTPATIENT
Start: 2020-01-08

## 2019-12-09 RX ORDER — SODIUM CHLORIDE 9 MG/ML
INJECTION, SOLUTION INTRAVENOUS CONTINUOUS
Status: CANCELLED | OUTPATIENT
Start: 2019-12-11

## 2019-12-09 RX ORDER — PALONOSETRON 0.05 MG/ML
0.25 INJECTION, SOLUTION INTRAVENOUS ONCE
Status: CANCELLED | OUTPATIENT
Start: 2020-01-07

## 2019-12-09 RX ORDER — DIPHENHYDRAMINE HYDROCHLORIDE 50 MG/ML
50 INJECTION INTRAMUSCULAR; INTRAVENOUS ONCE
Status: CANCELLED | OUTPATIENT
Start: 2020-01-08

## 2019-12-09 RX ORDER — SODIUM CHLORIDE 9 MG/ML
20 INJECTION, SOLUTION INTRAVENOUS ONCE
Status: CANCELLED | OUTPATIENT
Start: 2020-01-08

## 2019-12-09 RX ORDER — MEPERIDINE HYDROCHLORIDE 25 MG/ML
12.5 INJECTION INTRAMUSCULAR; INTRAVENOUS; SUBCUTANEOUS ONCE
Status: CANCELLED | OUTPATIENT
Start: 2019-12-10

## 2019-12-09 RX ORDER — SODIUM CHLORIDE 0.9 % (FLUSH) 0.9 %
5 SYRINGE (ML) INJECTION PRN
Status: CANCELLED | OUTPATIENT
Start: 2019-12-10

## 2019-12-09 RX ORDER — DIPHENHYDRAMINE HYDROCHLORIDE 50 MG/ML
50 INJECTION INTRAMUSCULAR; INTRAVENOUS ONCE
Status: CANCELLED | OUTPATIENT
Start: 2019-12-10

## 2019-12-09 RX ORDER — DIPHENHYDRAMINE HYDROCHLORIDE 50 MG/ML
25 INJECTION INTRAMUSCULAR; INTRAVENOUS ONCE
Status: CANCELLED | OUTPATIENT
Start: 2020-01-07

## 2019-12-09 RX ORDER — METHYLPREDNISOLONE SODIUM SUCCINATE 125 MG/2ML
125 INJECTION, POWDER, LYOPHILIZED, FOR SOLUTION INTRAMUSCULAR; INTRAVENOUS ONCE
Status: CANCELLED | OUTPATIENT
Start: 2019-12-11

## 2019-12-09 RX ORDER — SODIUM CHLORIDE 0.9 % (FLUSH) 0.9 %
5 SYRINGE (ML) INJECTION PRN
Status: CANCELLED | OUTPATIENT
Start: 2019-12-11

## 2019-12-10 ENCOUNTER — HOSPITAL ENCOUNTER (OUTPATIENT)
Dept: INFUSION THERAPY | Age: 59
Discharge: HOME | End: 2019-12-10
Payer: COMMERCIAL

## 2019-12-10 VITALS
SYSTOLIC BLOOD PRESSURE: 125 MMHG | RESPIRATION RATE: 18 BRPM | BODY MASS INDEX: 42.66 KG/M2 | TEMPERATURE: 98.9 F | OXYGEN SATURATION: 94 % | WEIGHT: 315 LBS | HEIGHT: 72 IN | HEART RATE: 78 BPM | DIASTOLIC BLOOD PRESSURE: 75 MMHG

## 2019-12-10 DIAGNOSIS — C82.58 DIFFUSE FOLLICLE CENTER LYMPHOMA OF LYMPH NODES OF MULTIPLE REGIONS (HCC): Primary | ICD-10-CM

## 2019-12-10 DIAGNOSIS — Z51.11 ENCOUNTER FOR CHEMOTHERAPY MANAGEMENT: ICD-10-CM

## 2019-12-10 LAB
ALBUMIN SERPL-MCNC: 4.2 G/DL (ref 3.5–5.1)
ALP BLD-CCNC: 51 U/L (ref 38–126)
ALT SERPL-CCNC: 19 U/L (ref 11–66)
AST SERPL-CCNC: 19 U/L (ref 5–40)
BILIRUB SERPL-MCNC: 0.6 MG/DL (ref 0.3–1.2)
BILIRUBIN DIRECT: < 0.2 MG/DL (ref 0–0.3)
BUN, WHOLE BLOOD: 23 MG/DL (ref 8–26)
CHLORIDE, WHOLE BLOOD: 101 MEQ/L (ref 98–109)
CREATININE, WHOLE BLOOD: 0.8 MG/DL (ref 0.5–1.2)
GFR, ESTIMATED ,CON: > 90 ML/MIN/1.73M2
GLUCOSE, WHOLE BLOOD: 258 MG/DL (ref 70–108)
HCT VFR BLD CALC: 42.5 % (ref 42–52)
HEMOGLOBIN: 14.3 GM/DL (ref 14–18)
IONIZED CALCIUM, WHOLE BLOOD: 1.22 MMOL/L (ref 1.12–1.32)
MCH RBC QN AUTO: 29.5 PG (ref 27–31)
MCHC RBC AUTO-ENTMCNC: 33.6 GM/DL (ref 33–37)
MCV RBC AUTO: 88 FL (ref 80–94)
PDW BLD-RTO: 12.8 % (ref 11.5–14.5)
PLATELET # BLD: 191 THOU/MM3 (ref 130–400)
PMV BLD AUTO: 7.6 FL (ref 7.4–10.4)
POTASSIUM, WHOLE BLOOD: 4.5 MEQ/L (ref 3.5–4.9)
RBC # BLD: 4.83 MILL/MM3 (ref 4.7–6.1)
SEG NEUTROPHILS: 75 % (ref 43–75)
SEGMENTED NEUTROPHILS ABSOLUTE COUNT: 9.3 THOU/MM3 (ref 1.8–7.7)
SODIUM, WHOLE BLOOD: 134 MEQ/L (ref 138–146)
TOTAL CO2, WHOLE BLOOD: 25 MEQ/L (ref 23–33)
TOTAL PROTEIN: 7.1 G/DL (ref 6.1–8)
WBC # BLD: 12.4 THOU/MM3 (ref 4.8–10.8)

## 2019-12-10 PROCEDURE — 80076 HEPATIC FUNCTION PANEL: CPT

## 2019-12-10 PROCEDURE — 36591 DRAW BLOOD OFF VENOUS DEVICE: CPT

## 2019-12-10 PROCEDURE — 85027 COMPLETE CBC AUTOMATED: CPT

## 2019-12-10 PROCEDURE — 6370000000 HC RX 637 (ALT 250 FOR IP): Performed by: INTERNAL MEDICINE

## 2019-12-10 PROCEDURE — 2580000003 HC RX 258: Performed by: INTERNAL MEDICINE

## 2019-12-10 PROCEDURE — 80047 BASIC METABLC PNL IONIZED CA: CPT

## 2019-12-10 PROCEDURE — 6360000002 HC RX W HCPCS: Performed by: INTERNAL MEDICINE

## 2019-12-10 PROCEDURE — 96413 CHEMO IV INFUSION 1 HR: CPT

## 2019-12-10 PROCEDURE — 96415 CHEMO IV INFUSION ADDL HR: CPT

## 2019-12-10 PROCEDURE — 96375 TX/PRO/DX INJ NEW DRUG ADDON: CPT

## 2019-12-10 PROCEDURE — 96367 TX/PROPH/DG ADDL SEQ IV INF: CPT

## 2019-12-10 PROCEDURE — 96411 CHEMO IV PUSH ADDL DRUG: CPT

## 2019-12-10 RX ORDER — PALONOSETRON 0.05 MG/ML
0.25 INJECTION, SOLUTION INTRAVENOUS ONCE
Status: COMPLETED | OUTPATIENT
Start: 2019-12-10 | End: 2019-12-10

## 2019-12-10 RX ORDER — SODIUM CHLORIDE 0.9 % (FLUSH) 0.9 %
10 SYRINGE (ML) INJECTION PRN
Status: DISCONTINUED | OUTPATIENT
Start: 2019-12-10 | End: 2019-12-11 | Stop reason: HOSPADM

## 2019-12-10 RX ORDER — ACETAMINOPHEN 325 MG/1
650 TABLET ORAL ONCE
Status: COMPLETED | OUTPATIENT
Start: 2019-12-10 | End: 2019-12-10

## 2019-12-10 RX ORDER — DIPHENHYDRAMINE HYDROCHLORIDE 50 MG/ML
25 INJECTION INTRAMUSCULAR; INTRAVENOUS ONCE
Status: COMPLETED | OUTPATIENT
Start: 2019-12-10 | End: 2019-12-10

## 2019-12-10 RX ORDER — SODIUM CHLORIDE 9 MG/ML
20 INJECTION, SOLUTION INTRAVENOUS ONCE
Status: COMPLETED | OUTPATIENT
Start: 2019-12-10 | End: 2019-12-10

## 2019-12-10 RX ORDER — ONDANSETRON 4 MG/1
4 TABLET, FILM COATED ORAL EVERY 8 HOURS PRN
COMMUNITY
End: 2019-12-10 | Stop reason: SDUPTHER

## 2019-12-10 RX ORDER — HEPARIN SODIUM (PORCINE) LOCK FLUSH IV SOLN 100 UNIT/ML 100 UNIT/ML
500 SOLUTION INTRAVENOUS PRN
Status: DISCONTINUED | OUTPATIENT
Start: 2019-12-10 | End: 2019-12-11 | Stop reason: HOSPADM

## 2019-12-10 RX ORDER — ONDANSETRON 4 MG/1
4 TABLET, FILM COATED ORAL EVERY 8 HOURS PRN
Qty: 30 TABLET | Refills: 3 | Status: SHIPPED | OUTPATIENT
Start: 2019-12-10 | End: 2020-08-31

## 2019-12-10 RX ADMIN — RITUXIMAB 750 MG: 10 INJECTION, SOLUTION INTRAVENOUS at 09:14

## 2019-12-10 RX ADMIN — SODIUM CHLORIDE, PRESERVATIVE FREE 500 UNITS: 5 INJECTION INTRAVENOUS at 14:28

## 2019-12-10 RX ADMIN — BENDAMUSTINE HYDROCHLORIDE 175 MG: 25 INJECTION, SOLUTION INTRAVENOUS at 13:48

## 2019-12-10 RX ADMIN — Medication 10 ML: at 08:46

## 2019-12-10 RX ADMIN — DEXAMETHASONE SODIUM PHOSPHATE 12 MG: 4 INJECTION, SOLUTION INTRAMUSCULAR; INTRAVENOUS at 08:54

## 2019-12-10 RX ADMIN — Medication 10 ML: at 08:25

## 2019-12-10 RX ADMIN — PALONOSETRON 0.25 MG: 0.05 INJECTION, SOLUTION INTRAVENOUS at 08:47

## 2019-12-10 RX ADMIN — Medication 10 ML: at 14:28

## 2019-12-10 RX ADMIN — SODIUM CHLORIDE 20 ML/HR: 9 INJECTION, SOLUTION INTRAVENOUS at 08:45

## 2019-12-10 RX ADMIN — DIPHENHYDRAMINE HYDROCHLORIDE 25 MG: 50 INJECTION INTRAMUSCULAR; INTRAVENOUS at 08:49

## 2019-12-10 RX ADMIN — ACETAMINOPHEN 650 MG: 325 TABLET ORAL at 08:46

## 2019-12-10 ASSESSMENT — PAIN SCALES - GENERAL: PAINLEVEL_OUTOF10: 0

## 2019-12-10 NOTE — PLAN OF CARE
Problem: Infection - Central Venous Catheter-Associated Bloodstream Infection:  Goal: Will show no infection signs and symptoms  Description  Will show no infection signs and symptoms  Outcome: Met This Shift  Note:   Mediport site with no redness or warmth. Skin over port intact with no signs of breakdown noted. Patient verbalizes signs/symptoms of port infection and when to notify the physician. Intervention: Infection risk assessment  Note:   Discussed port maintenance, infection prevention, signs and when to call the doctor     Problem: Musculor/Skeletal Functional Status  Goal: Absence of falls  Outcome: Met This Shift  Note:   Patient verbalizes understanding of fall precautions. Patient free from falls this visit. Intervention: Fall precautions  Note:   Discussed fall precautions, call light within reach. Problem: Intellectual/Education/Knowledge Deficit  Goal: Teaching initiated upon admission  Outcome: Met This Shift  Note:   Patient verbalizes understanding to verbal information given on rituxan and bendeka,action and possible side effects. Aware to call MD if develop complications.    Intervention: Verbal/written education provided  Note:   Chemotherapy Teaching     What is Chemotherapy   Drug action [x]   Method of Administration [x]   Handouts given []     Side Effects  Nausea/vomiting [x]   Diarrhea [x]   Fatigue [x]   Signs / Symptoms of infection [x]   Neutropenia [x]   Thrombocytopenia [x]   Alopecia [x]   neuropathy [x]   [place] diet &  the importance of fluids [x]       Micellaneous  Importance of nutrition [x]   Importance of oral hygiene [x]   When to call the MD [x]   Monitoring labs [x]   Use of supportive services []     Explanation of Drug Regimen / Frequency  Rituxan and bendeka     Comments  Verbalized understanding to drug,action,side effects and when to call MD        Problem: Discharge Planning  Goal: Knowledge of discharge instructions  Description  Knowledge of discharge instructions     Outcome: Met This Shift  Note:   Verbalized understanding of discharge instructions, follow-up appointments, and when to call the physician. Intervention: Discharge to appropriate level of care  Note:   Discuss discharge instructions, follow ups, and when to call the doctor. Care plan reviewed with patient. Patient verbalizes understanding of the plan of care and contribute to goal setting.

## 2019-12-10 NOTE — PROGRESS NOTES
Patient assessed for the following post chemotherapy:    Dizziness   No  Lightheadedness  No      Acute nausea/vomiting No  Headache   No  Chest pain/pressure  No  Rash/itching   No  Shortness of breath  No    Patient kept for 20 minutes observation post infusion chemotherapy. Patient tolerated chemotherapy treatment with rituxin and bendamustine without any complications. Last vital signs:   /75   Pulse 78   Temp 98.9 °F (37.2 °C) (Oral)   Resp 18   Ht 6' (1.829 m)   Wt (!) 351 lb (159.2 kg)   SpO2 94%   BMI 47.60 kg/m²     Patient instructed if experience any of the above symptoms following today's infusion,he is to notify MD immediately or go to the emergency department. Discharge instructions given to patient. Verbalizes understanding. Ambulated off unit per self with belongings.

## 2019-12-10 NOTE — PROGRESS NOTES
Chemotherapy Administration    Pre-assessment Data: Antineoplastic Agents  Other:   See toxicity flow sheet for assessment [x]     Physician Notification of Concerns Related to Chemotherapy Administration:   Physician Notified Gurpreet Rojasmichelle / Time of Notification      Interventions:   Lab work assessed  [x]   Height / Weight verified for dose [x]   Current MAR reviewed [x]   Emergency drugs available as appropriate [x]   Anaphylaxis assessment completed [x]   Pre-medications administered as ordered [x]   Blood return noted upon initiation of chemotherapy [x]   Blood return noted each 1-2ml of a vesicant medication if given IV push []   Blood return noted each 2-3ml of a non-vesicant medication if given IV push []   Monitor for signs / symptoms of hypersensitivity reaction [x]   Chemotherapy orders (drug/dose/rate) verified by 2 Chemo certified RNs [x]   Monitor IV site and blood return throughout the infusion of the medication [x]   Document IV site checks on the IV assessment form [x]   Document chemotherapy teaching on the Patient Education tab [x]   Document patient verbalizes understanding of medications being administered [x]   If IV infiltration, see ONS Guidelines []   Other:      []         Rituxan Administration:  First Infusion:  initial infusion rate should be 50 mg/hr. If no infusion related problems, increase IV rate in 50mg/hr increments every 30 min. Maximum rate of infusion is 400mg/hr or as ordered by physician. Subsequent Infusions: initial rate of 100mg/hr, increase in 100mg/hr increments every 30 minutes. Maximum rate of infusion is 400mg/hr or as ordered by the physician. If hypersensitivity reaction occurs, STOP RITUXAN and maintain plain IV. Notify physician immediately for additional orders.    Pre-medication s administered as ordered [x]   Assess for history of angina or heart arrhythmias [x]   Hypertensive meds held for 12 hours prior to Rituxan administration [x]   Document blood pressure, pulse, respiratory rate at start of infusion [x]   Document blood pressure, pulse, respiratory rate every 30 min with each increase in IV rate [x]   With each increase in IV rate, document if a reaction is suspected [x]   Document blood pressure, pulse, respiratory rate at the completion of infusion [x]   Other:    []

## 2019-12-11 ENCOUNTER — HOSPITAL ENCOUNTER (OUTPATIENT)
Dept: INFUSION THERAPY | Age: 59
Discharge: HOME | End: 2019-12-11
Payer: COMMERCIAL

## 2019-12-11 VITALS
DIASTOLIC BLOOD PRESSURE: 81 MMHG | OXYGEN SATURATION: 95 % | TEMPERATURE: 97.8 F | SYSTOLIC BLOOD PRESSURE: 131 MMHG | HEART RATE: 64 BPM | RESPIRATION RATE: 18 BRPM

## 2019-12-11 DIAGNOSIS — C82.58 DIFFUSE FOLLICLE CENTER LYMPHOMA OF LYMPH NODES OF MULTIPLE REGIONS (HCC): Primary | ICD-10-CM

## 2019-12-11 DIAGNOSIS — Z51.11 ENCOUNTER FOR CHEMOTHERAPY MANAGEMENT: ICD-10-CM

## 2019-12-11 PROCEDURE — 96375 TX/PRO/DX INJ NEW DRUG ADDON: CPT

## 2019-12-11 PROCEDURE — 2580000003 HC RX 258: Performed by: INTERNAL MEDICINE

## 2019-12-11 PROCEDURE — 96409 CHEMO IV PUSH SNGL DRUG: CPT

## 2019-12-11 PROCEDURE — 6360000002 HC RX W HCPCS: Performed by: INTERNAL MEDICINE

## 2019-12-11 RX ORDER — SODIUM CHLORIDE 9 MG/ML
20 INJECTION, SOLUTION INTRAVENOUS ONCE
Status: COMPLETED | OUTPATIENT
Start: 2019-12-11 | End: 2019-12-11

## 2019-12-11 RX ORDER — DEXAMETHASONE SODIUM PHOSPHATE 4 MG/ML
8 INJECTION, SOLUTION INTRA-ARTICULAR; INTRALESIONAL; INTRAMUSCULAR; INTRAVENOUS; SOFT TISSUE ONCE
Status: COMPLETED | OUTPATIENT
Start: 2019-12-11 | End: 2019-12-11

## 2019-12-11 RX ORDER — SODIUM CHLORIDE 0.9 % (FLUSH) 0.9 %
10 SYRINGE (ML) INJECTION PRN
Status: DISCONTINUED | OUTPATIENT
Start: 2019-12-11 | End: 2019-12-12 | Stop reason: HOSPADM

## 2019-12-11 RX ORDER — HEPARIN SODIUM (PORCINE) LOCK FLUSH IV SOLN 100 UNIT/ML 100 UNIT/ML
500 SOLUTION INTRAVENOUS PRN
Status: DISCONTINUED | OUTPATIENT
Start: 2019-12-11 | End: 2019-12-12 | Stop reason: HOSPADM

## 2019-12-11 RX ADMIN — Medication 10 ML: at 13:03

## 2019-12-11 RX ADMIN — DEXAMETHASONE SODIUM PHOSPHATE 8 MG: 4 INJECTION, SOLUTION INTRAMUSCULAR; INTRAVENOUS at 13:04

## 2019-12-11 RX ADMIN — Medication 10 ML: at 13:41

## 2019-12-11 RX ADMIN — BENDAMUSTINE HYDROCHLORIDE 175 MG: 25 INJECTION, SOLUTION INTRAVENOUS at 13:10

## 2019-12-11 RX ADMIN — Medication 500 UNITS: at 13:41

## 2019-12-11 RX ADMIN — SODIUM CHLORIDE 20 ML/HR: 9 INJECTION, SOLUTION INTRAVENOUS at 13:03

## 2019-12-11 NOTE — PLAN OF CARE
Problem: Infection - Central Venous Catheter-Associated Bloodstream Infection:  Goal: Will show no infection signs and symptoms  Description  Will show no infection signs and symptoms  Outcome: Met This Shift  Note:   Mediport site with no redness or warmth. Skin over port intact with no signs of breakdown noted. Patient verbalizes signs/symptoms of port infection and when to notify the physician. Intervention: Infection risk assessment  Note:   Discussed port maintenance, infection prevention, signs and when to call the doctor     Problem: Musculor/Skeletal Functional Status  Goal: Absence of falls  Outcome: Met This Shift  Note:   Patient verbalizes understanding of fall precautions. Patient free from falls this visit. Intervention: Fall precautions  Note:   Discussed fall precautions, call light within reach. Problem: Intellectual/Education/Knowledge Deficit  Goal: Teaching initiated upon admission  Outcome: Met This Shift  Note:   Patient verbalizes understanding to verbal information given on bendeka,action and possible side effects. Aware to call MD if develop complications. Intervention: Verbal/written education provided  Note:   Chemotherapy Teaching     What is Chemotherapy   Drug action [x]   Method of Administration [x]   Handouts given []     Side Effects  Nausea/vomiting [x]   Diarrhea [x]   Fatigue [x]   Signs / Symptoms of infection [x]   Neutropenia [x]   Thrombocytopenia [x]   Alopecia [x]   neuropathy [x]   [place] diet &  the importance of fluids [x]       Micellaneous  Importance of nutrition [x]   Importance of oral hygiene [x]   When to call the MD [x]   Monitoring labs [x]   Use of supportive services []     Explanation of Drug Regimen / Frequency  bendeka     Comments  Verbalized understanding to drug,action,side effects and when to call MD        Problem: Discharge Planning  Goal: Knowledge of discharge instructions  Description  Knowledge of discharge instructions     Outcome:  Met This Shift  Note:   Verbalized understanding of discharge instructions, follow-up appointments, and when to call the physician. Intervention: Discharge to appropriate level of care  Note:   Discuss discharge instructions, follow ups, and when to call the doctor. Care plan reviewed with patient. Patient verbalizes understanding of the plan of care and contribute to goal setting.

## 2019-12-11 NOTE — PROGRESS NOTES
Patient assessed for the following post chemotherapy:    Dizziness   No  Lightheadedness  No      Acute nausea/vomiting No  Headache   No  Chest pain/pressure  No  Rash/itching   No  Shortness of breath  No    Patient kept for 20 minutes observation post infusion chemotherapy. Patient tolerated chemotherapy treatment bendeka without any complications. Last vital signs:   /81   Pulse 64   Temp 97.8 °F (36.6 °C) (Oral)   Resp 18   SpO2 95%       Patient instructed if experience any of the above symptoms following today's infusion,he is to notify MD immediately or go to the emergency department. Discharge instructions given to patient. Verbalizes understanding. Ambulated off unit per self with belongings.

## 2019-12-13 ENCOUNTER — TELEPHONE (OUTPATIENT)
Dept: INFUSION THERAPY | Age: 59
End: 2019-12-13

## 2020-01-07 ENCOUNTER — OFFICE VISIT (OUTPATIENT)
Dept: ONCOLOGY | Age: 60
End: 2020-01-07
Payer: COMMERCIAL

## 2020-01-07 ENCOUNTER — HOSPITAL ENCOUNTER (OUTPATIENT)
Dept: INFUSION THERAPY | Age: 60
Discharge: HOME | End: 2020-01-07
Payer: COMMERCIAL

## 2020-01-07 VITALS
HEART RATE: 82 BPM | DIASTOLIC BLOOD PRESSURE: 86 MMHG | WEIGHT: 315 LBS | OXYGEN SATURATION: 94 % | SYSTOLIC BLOOD PRESSURE: 124 MMHG | BODY MASS INDEX: 42.66 KG/M2 | HEIGHT: 72 IN | TEMPERATURE: 98 F | RESPIRATION RATE: 20 BRPM

## 2020-01-07 VITALS
DIASTOLIC BLOOD PRESSURE: 68 MMHG | RESPIRATION RATE: 18 BRPM | OXYGEN SATURATION: 95 % | WEIGHT: 315 LBS | TEMPERATURE: 97.9 F | BODY MASS INDEX: 47.12 KG/M2 | HEART RATE: 81 BPM | SYSTOLIC BLOOD PRESSURE: 130 MMHG

## 2020-01-07 DIAGNOSIS — C82.58 DIFFUSE FOLLICLE CENTER LYMPHOMA OF LYMPH NODES OF MULTIPLE REGIONS (HCC): Primary | ICD-10-CM

## 2020-01-07 DIAGNOSIS — Z51.11 ENCOUNTER FOR CHEMOTHERAPY MANAGEMENT: ICD-10-CM

## 2020-01-07 LAB
ALBUMIN SERPL-MCNC: 4.1 G/DL (ref 3.5–5.1)
ALP BLD-CCNC: 45 U/L (ref 38–126)
ALT SERPL-CCNC: 24 U/L (ref 11–66)
AST SERPL-CCNC: 23 U/L (ref 5–40)
BILIRUB SERPL-MCNC: 0.4 MG/DL (ref 0.3–1.2)
BILIRUBIN DIRECT: < 0.2 MG/DL (ref 0–0.3)
BUN, WHOLE BLOOD: 25 MG/DL (ref 8–26)
CHLORIDE, WHOLE BLOOD: 103 MEQ/L (ref 98–109)
CREATININE, WHOLE BLOOD: 0.9 MG/DL (ref 0.5–1.2)
GFR, ESTIMATED ,CON: > 90 ML/MIN/1.73M2
GLUCOSE, WHOLE BLOOD: 185 MG/DL (ref 70–108)
HCT VFR BLD CALC: 44.1 % (ref 42–52)
HEMOGLOBIN: 15 GM/DL (ref 14–18)
IONIZED CALCIUM, WHOLE BLOOD: 1.24 MMOL/L (ref 1.12–1.32)
LD: 141 U/L (ref 100–190)
MCH RBC QN AUTO: 30 PG (ref 27–31)
MCHC RBC AUTO-ENTMCNC: 34 GM/DL (ref 33–37)
MCV RBC AUTO: 88 FL (ref 80–94)
PDW BLD-RTO: 12.8 % (ref 11.5–14.5)
PLATELET # BLD: 196 THOU/MM3 (ref 130–400)
PMV BLD AUTO: 7.3 FL (ref 7.4–10.4)
POTASSIUM, WHOLE BLOOD: 4.4 MEQ/L (ref 3.5–4.9)
RBC # BLD: 5 MILL/MM3 (ref 4.7–6.1)
SEG NEUTROPHILS: 69 % (ref 43–75)
SEGMENTED NEUTROPHILS ABSOLUTE COUNT: 5.8 THOU/MM3 (ref 1.8–7.7)
SODIUM, WHOLE BLOOD: 137 MEQ/L (ref 138–146)
TOTAL CO2, WHOLE BLOOD: 26 MEQ/L (ref 23–33)
TOTAL PROTEIN: 7.1 G/DL (ref 6.1–8)
WBC # BLD: 8.4 THOU/MM3 (ref 4.8–10.8)

## 2020-01-07 PROCEDURE — 2580000003 HC RX 258: Performed by: INTERNAL MEDICINE

## 2020-01-07 PROCEDURE — 96367 TX/PROPH/DG ADDL SEQ IV INF: CPT

## 2020-01-07 PROCEDURE — 96415 CHEMO IV INFUSION ADDL HR: CPT

## 2020-01-07 PROCEDURE — 96411 CHEMO IV PUSH ADDL DRUG: CPT

## 2020-01-07 PROCEDURE — 99211 OFF/OP EST MAY X REQ PHY/QHP: CPT

## 2020-01-07 PROCEDURE — 80047 BASIC METABLC PNL IONIZED CA: CPT

## 2020-01-07 PROCEDURE — G8427 DOCREV CUR MEDS BY ELIG CLIN: HCPCS | Performed by: INTERNAL MEDICINE

## 2020-01-07 PROCEDURE — 96375 TX/PRO/DX INJ NEW DRUG ADDON: CPT

## 2020-01-07 PROCEDURE — 83615 LACTATE (LD) (LDH) ENZYME: CPT

## 2020-01-07 PROCEDURE — 6360000002 HC RX W HCPCS: Performed by: INTERNAL MEDICINE

## 2020-01-07 PROCEDURE — G8417 CALC BMI ABV UP PARAM F/U: HCPCS | Performed by: INTERNAL MEDICINE

## 2020-01-07 PROCEDURE — 6370000000 HC RX 637 (ALT 250 FOR IP): Performed by: INTERNAL MEDICINE

## 2020-01-07 PROCEDURE — 96413 CHEMO IV INFUSION 1 HR: CPT

## 2020-01-07 PROCEDURE — 36591 DRAW BLOOD OFF VENOUS DEVICE: CPT

## 2020-01-07 PROCEDURE — 3017F COLORECTAL CA SCREEN DOC REV: CPT | Performed by: INTERNAL MEDICINE

## 2020-01-07 PROCEDURE — 99215 OFFICE O/P EST HI 40 MIN: CPT | Performed by: INTERNAL MEDICINE

## 2020-01-07 PROCEDURE — 80076 HEPATIC FUNCTION PANEL: CPT

## 2020-01-07 PROCEDURE — G8484 FLU IMMUNIZE NO ADMIN: HCPCS | Performed by: INTERNAL MEDICINE

## 2020-01-07 PROCEDURE — 85027 COMPLETE CBC AUTOMATED: CPT

## 2020-01-07 PROCEDURE — 1036F TOBACCO NON-USER: CPT | Performed by: INTERNAL MEDICINE

## 2020-01-07 RX ORDER — DIPHENHYDRAMINE HYDROCHLORIDE 50 MG/ML
25 INJECTION INTRAMUSCULAR; INTRAVENOUS ONCE
Status: COMPLETED | OUTPATIENT
Start: 2020-01-07 | End: 2020-01-07

## 2020-01-07 RX ORDER — SODIUM CHLORIDE 9 MG/ML
20 INJECTION, SOLUTION INTRAVENOUS ONCE
Status: COMPLETED | OUTPATIENT
Start: 2020-01-07 | End: 2020-01-07

## 2020-01-07 RX ORDER — SODIUM CHLORIDE 0.9 % (FLUSH) 0.9 %
10 SYRINGE (ML) INJECTION PRN
Status: DISCONTINUED | OUTPATIENT
Start: 2020-01-07 | End: 2020-01-08 | Stop reason: HOSPADM

## 2020-01-07 RX ORDER — PALONOSETRON 0.05 MG/ML
0.25 INJECTION, SOLUTION INTRAVENOUS ONCE
Status: COMPLETED | OUTPATIENT
Start: 2020-01-07 | End: 2020-01-07

## 2020-01-07 RX ORDER — ACETAMINOPHEN 325 MG/1
650 TABLET ORAL ONCE
Status: COMPLETED | OUTPATIENT
Start: 2020-01-07 | End: 2020-01-07

## 2020-01-07 RX ORDER — HEPARIN SODIUM (PORCINE) LOCK FLUSH IV SOLN 100 UNIT/ML 100 UNIT/ML
500 SOLUTION INTRAVENOUS PRN
Status: DISCONTINUED | OUTPATIENT
Start: 2020-01-07 | End: 2020-01-08 | Stop reason: HOSPADM

## 2020-01-07 RX ORDER — GLIPIZIDE 5 MG/1
5 TABLET, FILM COATED, EXTENDED RELEASE ORAL
COMMUNITY
Start: 2019-12-20 | End: 2021-03-16 | Stop reason: ALTCHOICE

## 2020-01-07 RX ADMIN — Medication 500 UNITS: at 13:10

## 2020-01-07 RX ADMIN — Medication 10 ML: at 08:15

## 2020-01-07 RX ADMIN — DEXAMETHASONE SODIUM PHOSPHATE 12 MG: 4 INJECTION, SOLUTION INTRAMUSCULAR; INTRAVENOUS at 09:27

## 2020-01-07 RX ADMIN — RITUXIMAB 750 MG: 10 INJECTION, SOLUTION INTRAVENOUS at 09:55

## 2020-01-07 RX ADMIN — Medication 10 ML: at 08:16

## 2020-01-07 RX ADMIN — ACETAMINOPHEN 650 MG: 325 TABLET ORAL at 09:19

## 2020-01-07 RX ADMIN — PALONOSETRON 0.25 MG: 0.05 INJECTION, SOLUTION INTRAVENOUS at 09:20

## 2020-01-07 RX ADMIN — BENDAMUSTINE HYDROCHLORIDE 175 MG: 25 INJECTION, SOLUTION INTRAVENOUS at 12:40

## 2020-01-07 RX ADMIN — DIPHENHYDRAMINE HYDROCHLORIDE 25 MG: 50 INJECTION INTRAMUSCULAR; INTRAVENOUS at 09:22

## 2020-01-07 RX ADMIN — Medication 10 ML: at 09:15

## 2020-01-07 RX ADMIN — SODIUM CHLORIDE 20 ML/HR: 9 INJECTION, SOLUTION INTRAVENOUS at 09:15

## 2020-01-07 RX ADMIN — Medication 10 ML: at 13:10

## 2020-01-07 ASSESSMENT — PAIN SCALES - GENERAL: PAINLEVEL_OUTOF10: 0

## 2020-01-07 NOTE — PLAN OF CARE
Problem: Infection - Central Venous Catheter-Associated Bloodstream Infection:  Goal: Will show no infection signs and symptoms  Description  Will show no infection signs and symptoms  Outcome: Met This Shift  Note:   Mediport site with no redness or warmth. Skin over port intact with no signs of breakdown noted. Patient verbalizes signs/symptoms of port infection and when to notify the physician. Intervention: Infection risk assessment  Note:   Discussed port maintenance, infection prevention, signs and when to call the doctor     Problem: Musculor/Skeletal Functional Status  Goal: Absence of falls  Outcome: Met This Shift  Note:   Patient verbalizes understanding of fall precautions. Patient free from falls this visit. Intervention: Fall precautions  Note:   Discussed fall precautions, call light within reach. Problem: Intellectual/Education/Knowledge Deficit  Goal: Teaching initiated upon admission  Outcome: Met This Shift  Note:   Patient verbalizes understanding to verbal information given on rituxan and bendeka,action and possible side effects. Aware to call MD if develop complications.    Intervention: Verbal/written education provided  Note:   Chemotherapy Teaching     What is Chemotherapy   Drug action [x]   Method of Administration [x]   Handouts given []     Side Effects  Nausea/vomiting [x]   Diarrhea [x]   Fatigue [x]   Signs / Symptoms of infection [x]   Neutropenia [x]   Thrombocytopenia [x]   Alopecia [x]   neuropathy [x]   [place] diet &  the importance of fluids [x]       Micellaneous  Importance of nutrition [x]   Importance of oral hygiene [x]   When to call the MD [x]   Monitoring labs [x]   Use of supportive services []     Explanation of Drug Regimen / Frequency  Rituxan and bendeka     Comments  Verbalized understanding to drug,action,side effects and when to call MD        Problem: Discharge Planning  Goal: Knowledge of discharge instructions  Description  Knowledge of discharge instructions     Outcome: Met This Shift  Note:   Verbalized understanding of discharge instructions, follow-up appointments, and when to call the physician. Intervention: Discharge to appropriate level of care  Note:   Discuss discharge instructions, follow ups, and when to call the doctor. Care plan reviewed with patient. Patient verbalizes understanding of the plan of care and contribute to goal setting.

## 2020-01-07 NOTE — PROGRESS NOTES
[facility]  [facility]  [facility] [address] [facility]  [address] [address]  Dept: [phone]  Dept Fax: [phone]: [phone]     Subjective:      Chief Complaint:  Nabil Saha Speaker is a 62 y.o. with lymphoma. HPI  This patient is here today for follow-up and consideration of further chemotherapy treatment. He has a diagnosis of a low grade lymphoma. The patient's chemotherapy regimen is bendamustine and Rituxan and he has completed one cycle of treatment. Thus far, the patient has been tolerating chemotherapy treatment fairly well. He does not complain of fatigue or weakness. The patient has had minimal nausea and vomiting. His nausea was well controlled with the use of Zofran. He denies having fever and has no signs of infection. Both his bowel and bladder habits have been fairly normal.  He continues to be active and has an ECOG performance status of level 0. The patient will proceed with his next cycle of chemotherapy treatment. PMH, SH, and FH:  I reviewed the patients medication list and allergy list as noted on the electronic medical record. The PMH, SH and FH were also reviewed as noted on the EMR. Review of Systems  Constitutional: Negative. HENT: Negative. Eyes: Negative. Respiratory: Negative. Cardiovascular: Negative. Gastrointestinal: Negative. Genitourinary: Negative. Musculoskeletal: Negative. Skin: Negative. Neurological: Negative. Hematological: Negative. Psychiatric/Behavioral: Negative. Objective:   Physical Exam  Vitals:    01/07/20 0830   BP: 124/86   Site: Left Upper Arm   Position: Sitting   Cuff Size: Large Adult   Pulse: 82   Resp: 20   Temp: 98 °F (36.7 °C)   TempSrc: Oral   SpO2: 94%   Weight: (!) 347 lb 6.4 oz (157.6 kg)   Height: 6' (1.829 m)   Vitals reviewed and are stable.   Constitutional: Well-developed and well-nourished. No acute distress. HENT: Normocephalic and atraumatic. Eyes: Pupils are equal and reactive. No scleral icterus. es. Chest: Mediport in place in the upper chest. Appears stable. Pulmonary: Effort normal. No respiratory distress. Cardiovascular: RRR. No edema in any of the four extremities. Abdominal: Soft. No hepatomegaly or splenomegaly. Musculoskeletal: Gait is normal. Muscle strength and tone good. Neurological: Alert and oriented to person, place, and time. Judgment and thought content normal.  Skin: Skin is warm and dry. No rash. Psychiatric: Mood and affect appropriate for the clinical situation. Behavior is normal.      Data Analysis: The following studies were reviewed with the patient today:    Hematology 1/7/2020 12/10/2019 11/5/2019   WBC 8.4 12.4 (H) 9.3   RBC 5.00 4.83 5.51   HGB 15.0 14.3 15.9   HCT 44.1 42.5 48.6   MCV 88 88 88.2   RDW 12.8 12.8     191 203   ESR   6     Assessment:   1. Follicular lymphoma stage III. 2.  Chemotherapy encounter. Plan:   1. Seed with cycle #2 of chemotherapy treatment with bendamustine and Rituxan. 2.  Monitor for toxicity related to chemotherapy treatment. 3.  Monitor for response of malignancy to chemotherapy treatment. Ashlee Manzano M.D. Medical Director: [facility]  [facility] [address] [address] [address] [employer] [facility], [address] [facility], [address] [place] [address], [address] [place] [address], [address] [place] [address] of the [facility] at the [address] [place] [address]      **This report has been created using voice recognition software. It may contain minor errors which are inherent in voice recognition technology. **

## 2020-01-07 NOTE — PROGRESS NOTES
Patient assessed for the following post chemotherapy:    Dizziness   No  Lightheadedness  No      Acute nausea/vomiting No  Headache   No  Chest pain/pressure  No  Rash/itching   No  Shortness of breath  No    Patient kept for 20 minutes observation post infusion chemotherapy. Patient tolerated chemotherapy treatment rituxan and bendeka without any complications. Last vital signs:   /68   Pulse 81   Temp 97.9 °F (36.6 °C) (Oral)   Resp 18   Wt (!) 347 lb 6.4 oz (157.6 kg)   SpO2 95%   BMI 47.12 kg/m²       Patient instructed if experience any of the above symptoms following today's infusion,he is to notify MD immediately or go to the emergency department. Discharge instructions given to patient. Verbalizes understanding. Ambulated off unit per self with belongings.

## 2020-01-07 NOTE — PROGRESS NOTES
Chemotherapy Administration    Pre-assessment Data: Antineoplastic Agents  Other:   See toxicity flow sheet for assessment [x]     Physician Notification of Concerns Related to Chemotherapy Administration:   Physician Notified Janette Mccurdy / Time of Notification Patient saw Dr Ruby Prince today     Interventions:   Lab work assessed  [x]   Height / Weight verified for dose [x]   Current MAR reviewed [x]   Emergency drugs available as appropriate [x]   Anaphylaxis assessment completed [x]   Pre-medications administered as ordered [x]   Blood return noted upon initiation of chemotherapy [x]   Blood return noted each 1-2ml of a vesicant medication if given IV push []   Blood return noted each 2-3ml of a non-vesicant medication if given IV push []   Monitor for signs / symptoms of hypersensitivity reaction [x]   Chemotherapy orders (drug/dose/rate) verified by 2 Chemo certified RNs [x]   Monitor IV site and blood return throughout the infusion of the medication [x]   Document IV site checks on the IV assessment form [x]   Document chemotherapy teaching on the Patient Education tab [x]   Document patient verbalizes understanding of medications being administered [x]   If IV infiltration, see ONS Guidelines []   Other:      []         Rituxan Administration:  First Infusion:  initial infusion rate should be 50 mg/hr. If no infusion related problems, increase IV rate in 50mg/hr increments every 30 min. Maximum rate of infusion is 400mg/hr or as ordered by physician. Subsequent Infusions: initial rate of 100mg/hr, increase in 100mg/hr increments every 30 minutes. Maximum rate of infusion is 400mg/hr or as ordered by the physician. If hypersensitivity reaction occurs, STOP RITUXAN and maintain plain IV. Notify physician immediately for additional orders.    Pre-medication s administered as ordered [x]   Assess for history of angina or heart arrhythmias [x]   Hypertensive meds held for 12 hours prior to Rituxan administration [x] Document blood pressure, pulse, respiratory rate at start of infusion [x]   Document blood pressure, pulse, respiratory rate every 30 min with each increase in IV rate [x]   With each increase in IV rate, document if a reaction is suspected [x]   Document blood pressure, pulse, respiratory rate at the completion of infusion [x]   Other:    []

## 2020-01-08 ENCOUNTER — HOSPITAL ENCOUNTER (OUTPATIENT)
Dept: INFUSION THERAPY | Age: 60
Discharge: HOME | End: 2020-01-08
Payer: COMMERCIAL

## 2020-01-08 VITALS
OXYGEN SATURATION: 94 % | TEMPERATURE: 98.2 F | RESPIRATION RATE: 20 BRPM | HEART RATE: 73 BPM | DIASTOLIC BLOOD PRESSURE: 76 MMHG | SYSTOLIC BLOOD PRESSURE: 150 MMHG

## 2020-01-08 DIAGNOSIS — C82.58 DIFFUSE FOLLICLE CENTER LYMPHOMA OF LYMPH NODES OF MULTIPLE REGIONS (HCC): Primary | ICD-10-CM

## 2020-01-08 DIAGNOSIS — Z51.11 ENCOUNTER FOR CHEMOTHERAPY MANAGEMENT: ICD-10-CM

## 2020-01-08 PROCEDURE — 96409 CHEMO IV PUSH SNGL DRUG: CPT

## 2020-01-08 PROCEDURE — 2580000003 HC RX 258: Performed by: INTERNAL MEDICINE

## 2020-01-08 PROCEDURE — 96375 TX/PRO/DX INJ NEW DRUG ADDON: CPT

## 2020-01-08 PROCEDURE — 6360000002 HC RX W HCPCS: Performed by: INTERNAL MEDICINE

## 2020-01-08 RX ORDER — HEPARIN SODIUM (PORCINE) LOCK FLUSH IV SOLN 100 UNIT/ML 100 UNIT/ML
500 SOLUTION INTRAVENOUS PRN
Status: DISCONTINUED | OUTPATIENT
Start: 2020-01-08 | End: 2020-01-09 | Stop reason: HOSPADM

## 2020-01-08 RX ORDER — SODIUM CHLORIDE 9 MG/ML
20 INJECTION, SOLUTION INTRAVENOUS ONCE
Status: COMPLETED | OUTPATIENT
Start: 2020-01-08 | End: 2020-01-08

## 2020-01-08 RX ORDER — DEXAMETHASONE SODIUM PHOSPHATE 4 MG/ML
8 INJECTION, SOLUTION INTRA-ARTICULAR; INTRALESIONAL; INTRAMUSCULAR; INTRAVENOUS; SOFT TISSUE ONCE
Status: COMPLETED | OUTPATIENT
Start: 2020-01-08 | End: 2020-01-08

## 2020-01-08 RX ORDER — SODIUM CHLORIDE 0.9 % (FLUSH) 0.9 %
10 SYRINGE (ML) INJECTION PRN
Status: DISCONTINUED | OUTPATIENT
Start: 2020-01-08 | End: 2020-01-09 | Stop reason: HOSPADM

## 2020-01-08 RX ADMIN — SODIUM CHLORIDE 20 ML/HR: 9 INJECTION, SOLUTION INTRAVENOUS at 13:04

## 2020-01-08 RX ADMIN — Medication 500 UNITS: at 13:53

## 2020-01-08 RX ADMIN — Medication 10 ML: at 13:53

## 2020-01-08 RX ADMIN — Medication 10 ML: at 13:04

## 2020-01-08 RX ADMIN — DEXAMETHASONE SODIUM PHOSPHATE 8 MG: 4 INJECTION, SOLUTION INTRAMUSCULAR; INTRAVENOUS at 13:05

## 2020-01-08 RX ADMIN — BENDAMUSTINE HYDROCHLORIDE 175 MG: 25 INJECTION, SOLUTION INTRAVENOUS at 13:16

## 2020-01-08 NOTE — PLAN OF CARE
Problem: Infection - Central Venous Catheter-Associated Bloodstream Infection:  Goal: Will show no infection signs and symptoms  Description  Will show no infection signs and symptoms  Outcome: Met This Shift  Note:   Mediport site with no redness or warmth. Skin over port intact with no signs of breakdown noted. Patient verbalizes signs/symptoms of port infection and when to notify the physician. Intervention: Infection risk assessment  Note:   Discussed port maintenance, infection prevention, signs and when to call the doctor     Problem: Musculor/Skeletal Functional Status  Goal: Absence of falls  Outcome: Met This Shift  Note:   Patient verbalizes understanding of fall precautions. Patient free from falls this visit. Intervention: Fall precautions  Note:   Discussed fall precautions, call light within reach. Problem: Intellectual/Education/Knowledge Deficit  Goal: Teaching initiated upon admission  Outcome: Met This Shift  Note:   Patient verbalizes understanding to verbal information given on bendeka,action and possible side effects. Aware to call MD if develop complications. Intervention: Verbal/written education provided  Note:   Chemotherapy Teaching     What is Chemotherapy   Drug action [x]   Method of Administration [x]   Handouts given []     Side Effects  Nausea/vomiting [x]   Diarrhea [x]   Fatigue [x]   Signs / Symptoms of infection [x]   Neutropenia [x]   Thrombocytopenia [x]   Alopecia [x]   neuropathy [x]   [place] diet &  the importance of fluids [x]       Micellaneous  Importance of nutrition [x]   Importance of oral hygiene [x]   When to call the MD [x]   Monitoring labs [x]   Use of supportive services []     Explanation of Drug Regimen / Frequency  bendeka     Comments  Verbalized understanding to drug,action,side effects and when to call MD        Problem: Discharge Planning  Goal: Knowledge of discharge instructions  Description  Knowledge of discharge instructions     Outcome:  Met This Shift  Note:   Verbalized understanding of discharge instructions, follow-up appointments, and when to call the physician. Intervention: Discharge to appropriate level of care  Note:   Discuss discharge instructions, follow ups, and when to call the doctor. Care plan reviewed with patient. Patient verbalizes understanding of the plan of care and contribute to goal setting.

## 2020-01-08 NOTE — PROGRESS NOTES
Chemotherapy Administration    Pre-assessment Data: Antineoplastic Agents  Other:   See toxicity flow sheet for assessment [x]     Physician Notification of Concerns Related to Chemotherapy Administration:   Physician Notified Dana Parker / Time of Notification      Interventions:   Lab work assessed  [x]   Height / Weight verified for dose [x]   Current MAR reviewed [x]   Emergency drugs available as appropriate [x]   Anaphylaxis assessment completed [x]   Pre-medications administered as ordered [x]   Blood return noted upon initiation of chemotherapy [x]   Blood return noted each 1-2ml of a vesicant medication if given IV push []   Blood return noted each 2-3ml of a non-vesicant medication if given IV push []   Monitor for signs / symptoms of hypersensitivity reaction [x]   Chemotherapy orders (drug/dose/rate) verified by 2 Chemo certified RNs [x]   Monitor IV site and blood return throughout the infusion of the medication [x]   Document IV site checks on the IV assessment form [x]   Document chemotherapy teaching on the Patient Education tab [x]   Document patient verbalizes understanding of medications being administered [x]   If IV infiltration, see ONS Guidelines []   Other:      []

## 2020-01-08 NOTE — PROGRESS NOTES
Patient assessed for the following post chemotherapy:    Dizziness   No  Lightheadedness  No      Acute nausea/vomiting No  Headache   No  Chest pain/pressure  No  Rash/itching   No  Shortness of breath  No    Patient kept for 20 minutes observation post infusion chemotherapy. Patient tolerated chemotherapy treatment bendeka without any complications. Last vital signs:   BP (!) 150/76   Pulse 73   Temp 98.2 °F (36.8 °C) (Oral)   Resp 20   SpO2 94%     Patient instructed if experience any of the above symptoms following today's infusion,he is to notify MD immediately or go to the emergency department. Discharge instructions given to patient. Verbalizes understanding. Ambulated off unit per self with belongings.

## 2020-02-03 RX ORDER — SODIUM CHLORIDE 9 MG/ML
INJECTION, SOLUTION INTRAVENOUS CONTINUOUS
Status: CANCELLED | OUTPATIENT
Start: 2020-02-04

## 2020-02-03 RX ORDER — SODIUM CHLORIDE 9 MG/ML
20 INJECTION, SOLUTION INTRAVENOUS ONCE
Status: CANCELLED | OUTPATIENT
Start: 2020-02-05

## 2020-02-03 RX ORDER — DEXAMETHASONE SODIUM PHOSPHATE 4 MG/ML
8 INJECTION, SOLUTION INTRA-ARTICULAR; INTRALESIONAL; INTRAMUSCULAR; INTRAVENOUS; SOFT TISSUE ONCE
Status: CANCELLED | OUTPATIENT
Start: 2020-02-05

## 2020-02-03 RX ORDER — MEPERIDINE HYDROCHLORIDE 25 MG/ML
12.5 INJECTION INTRAMUSCULAR; INTRAVENOUS; SUBCUTANEOUS ONCE
Status: CANCELLED | OUTPATIENT
Start: 2020-02-04

## 2020-02-03 RX ORDER — HEPARIN SODIUM (PORCINE) LOCK FLUSH IV SOLN 100 UNIT/ML 100 UNIT/ML
500 SOLUTION INTRAVENOUS PRN
Status: CANCELLED | OUTPATIENT
Start: 2020-02-05

## 2020-02-03 RX ORDER — SODIUM CHLORIDE 0.9 % (FLUSH) 0.9 %
5 SYRINGE (ML) INJECTION PRN
Status: CANCELLED | OUTPATIENT
Start: 2020-02-05

## 2020-02-03 RX ORDER — METHYLPREDNISOLONE SODIUM SUCCINATE 125 MG/2ML
125 INJECTION, POWDER, LYOPHILIZED, FOR SOLUTION INTRAMUSCULAR; INTRAVENOUS ONCE
Status: CANCELLED | OUTPATIENT
Start: 2020-02-05

## 2020-02-03 RX ORDER — METHYLPREDNISOLONE SODIUM SUCCINATE 125 MG/2ML
125 INJECTION, POWDER, LYOPHILIZED, FOR SOLUTION INTRAMUSCULAR; INTRAVENOUS ONCE
Status: CANCELLED | OUTPATIENT
Start: 2020-02-04

## 2020-02-03 RX ORDER — SODIUM CHLORIDE 0.9 % (FLUSH) 0.9 %
5 SYRINGE (ML) INJECTION PRN
Status: CANCELLED | OUTPATIENT
Start: 2020-02-04

## 2020-02-03 RX ORDER — SODIUM CHLORIDE 0.9 % (FLUSH) 0.9 %
10 SYRINGE (ML) INJECTION PRN
Status: CANCELLED | OUTPATIENT
Start: 2020-02-05

## 2020-02-03 RX ORDER — SODIUM CHLORIDE 9 MG/ML
INJECTION, SOLUTION INTRAVENOUS CONTINUOUS
Status: CANCELLED | OUTPATIENT
Start: 2020-02-05

## 2020-02-03 RX ORDER — DIPHENHYDRAMINE HYDROCHLORIDE 50 MG/ML
50 INJECTION INTRAMUSCULAR; INTRAVENOUS ONCE
Status: CANCELLED | OUTPATIENT
Start: 2020-02-04

## 2020-02-03 RX ORDER — DIPHENHYDRAMINE HYDROCHLORIDE 50 MG/ML
50 INJECTION INTRAMUSCULAR; INTRAVENOUS ONCE
Status: CANCELLED | OUTPATIENT
Start: 2020-02-05

## 2020-02-04 ENCOUNTER — HOSPITAL ENCOUNTER (OUTPATIENT)
Dept: INFUSION THERAPY | Age: 60
Discharge: HOME | End: 2020-02-04
Payer: COMMERCIAL

## 2020-02-04 ENCOUNTER — OFFICE VISIT (OUTPATIENT)
Dept: ONCOLOGY | Age: 60
End: 2020-02-04
Payer: COMMERCIAL

## 2020-02-04 VITALS
HEIGHT: 72 IN | HEART RATE: 95 BPM | TEMPERATURE: 98.4 F | RESPIRATION RATE: 20 BRPM | OXYGEN SATURATION: 94 % | SYSTOLIC BLOOD PRESSURE: 131 MMHG | BODY MASS INDEX: 42.66 KG/M2 | WEIGHT: 315 LBS | DIASTOLIC BLOOD PRESSURE: 89 MMHG

## 2020-02-04 VITALS
OXYGEN SATURATION: 95 % | DIASTOLIC BLOOD PRESSURE: 84 MMHG | HEIGHT: 72 IN | SYSTOLIC BLOOD PRESSURE: 154 MMHG | WEIGHT: 315 LBS | BODY MASS INDEX: 42.66 KG/M2 | TEMPERATURE: 98.1 F | HEART RATE: 84 BPM | RESPIRATION RATE: 18 BRPM

## 2020-02-04 DIAGNOSIS — C82.58 DIFFUSE FOLLICLE CENTER LYMPHOMA OF LYMPH NODES OF MULTIPLE REGIONS (HCC): Primary | ICD-10-CM

## 2020-02-04 DIAGNOSIS — Z51.11 ENCOUNTER FOR CHEMOTHERAPY MANAGEMENT: ICD-10-CM

## 2020-02-04 PROBLEM — R11.2 NAUSEA AND VOMITING: Status: ACTIVE | Noted: 2020-02-04

## 2020-02-04 PROBLEM — T45.1X5A LEUKOPENIA DUE TO ANTINEOPLASTIC CHEMOTHERAPY (HCC): Status: ACTIVE | Noted: 2020-02-04

## 2020-02-04 PROBLEM — D70.1 LEUKOPENIA DUE TO ANTINEOPLASTIC CHEMOTHERAPY (HCC): Status: ACTIVE | Noted: 2020-02-04

## 2020-02-04 LAB
ALBUMIN SERPL-MCNC: 4.1 G/DL (ref 3.5–5.1)
ALP BLD-CCNC: 49 U/L (ref 38–126)
ALT SERPL-CCNC: 29 U/L (ref 11–66)
AST SERPL-CCNC: 27 U/L (ref 5–40)
BILIRUB SERPL-MCNC: 0.5 MG/DL (ref 0.3–1.2)
BILIRUBIN DIRECT: < 0.2 MG/DL (ref 0–0.3)
BUN, WHOLE BLOOD: 23 MG/DL (ref 8–26)
CHLORIDE, WHOLE BLOOD: 102 MEQ/L (ref 98–109)
CREATININE, WHOLE BLOOD: 0.9 MG/DL (ref 0.5–1.2)
GFR, ESTIMATED ,CON: > 90 ML/MIN/1.73M2
GLUCOSE, WHOLE BLOOD: 188 MG/DL (ref 70–108)
HCT VFR BLD CALC: 47.2 % (ref 42–52)
HEMOGLOBIN: 15.6 GM/DL (ref 14–18)
IONIZED CALCIUM, WHOLE BLOOD: 1.19 MMOL/L (ref 1.12–1.32)
LD: 161 U/L (ref 100–190)
MCH RBC QN AUTO: 29.2 PG (ref 27–31)
MCHC RBC AUTO-ENTMCNC: 33.1 GM/DL (ref 33–37)
MCV RBC AUTO: 88 FL (ref 80–94)
PDW BLD-RTO: 13.8 % (ref 11.5–14.5)
PLATELET # BLD: 211 THOU/MM3 (ref 130–400)
PMV BLD AUTO: 7.8 FL (ref 7.4–10.4)
POTASSIUM, WHOLE BLOOD: 4 MEQ/L (ref 3.5–4.9)
RBC # BLD: 5.35 MILL/MM3 (ref 4.7–6.1)
SODIUM, WHOLE BLOOD: 138 MEQ/L (ref 138–146)
TOTAL CO2, WHOLE BLOOD: 24 MEQ/L (ref 23–33)
TOTAL PROTEIN: 7.1 G/DL (ref 6.1–8)
WBC # BLD: 4.6 THOU/MM3 (ref 4.8–10.8)

## 2020-02-04 PROCEDURE — 85027 COMPLETE CBC AUTOMATED: CPT

## 2020-02-04 PROCEDURE — 1036F TOBACCO NON-USER: CPT | Performed by: INTERNAL MEDICINE

## 2020-02-04 PROCEDURE — 2580000003 HC RX 258: Performed by: INTERNAL MEDICINE

## 2020-02-04 PROCEDURE — 96413 CHEMO IV INFUSION 1 HR: CPT

## 2020-02-04 PROCEDURE — 6360000002 HC RX W HCPCS: Performed by: INTERNAL MEDICINE

## 2020-02-04 PROCEDURE — 83615 LACTATE (LD) (LDH) ENZYME: CPT

## 2020-02-04 PROCEDURE — 99211 OFF/OP EST MAY X REQ PHY/QHP: CPT

## 2020-02-04 PROCEDURE — 80076 HEPATIC FUNCTION PANEL: CPT

## 2020-02-04 PROCEDURE — 96375 TX/PRO/DX INJ NEW DRUG ADDON: CPT

## 2020-02-04 PROCEDURE — 99215 OFFICE O/P EST HI 40 MIN: CPT | Performed by: INTERNAL MEDICINE

## 2020-02-04 PROCEDURE — 6370000000 HC RX 637 (ALT 250 FOR IP): Performed by: INTERNAL MEDICINE

## 2020-02-04 PROCEDURE — G8427 DOCREV CUR MEDS BY ELIG CLIN: HCPCS | Performed by: INTERNAL MEDICINE

## 2020-02-04 PROCEDURE — 36591 DRAW BLOOD OFF VENOUS DEVICE: CPT

## 2020-02-04 PROCEDURE — G8417 CALC BMI ABV UP PARAM F/U: HCPCS | Performed by: INTERNAL MEDICINE

## 2020-02-04 PROCEDURE — 96367 TX/PROPH/DG ADDL SEQ IV INF: CPT

## 2020-02-04 PROCEDURE — 80047 BASIC METABLC PNL IONIZED CA: CPT

## 2020-02-04 PROCEDURE — G8484 FLU IMMUNIZE NO ADMIN: HCPCS | Performed by: INTERNAL MEDICINE

## 2020-02-04 PROCEDURE — 96415 CHEMO IV INFUSION ADDL HR: CPT

## 2020-02-04 PROCEDURE — 3017F COLORECTAL CA SCREEN DOC REV: CPT | Performed by: INTERNAL MEDICINE

## 2020-02-04 PROCEDURE — 96411 CHEMO IV PUSH ADDL DRUG: CPT

## 2020-02-04 RX ORDER — SODIUM CHLORIDE 0.9 % (FLUSH) 0.9 %
10 SYRINGE (ML) INJECTION PRN
Status: DISCONTINUED | OUTPATIENT
Start: 2020-02-04 | End: 2020-02-05 | Stop reason: HOSPADM

## 2020-02-04 RX ORDER — FOSAPREPITANT 150 MG/5ML
150 INJECTION, POWDER, LYOPHILIZED, FOR SOLUTION INTRAVENOUS ONCE
Status: CANCELLED
Start: 2020-02-04

## 2020-02-04 RX ORDER — HEPARIN SODIUM (PORCINE) LOCK FLUSH IV SOLN 100 UNIT/ML 100 UNIT/ML
500 SOLUTION INTRAVENOUS PRN
Status: DISCONTINUED | OUTPATIENT
Start: 2020-02-04 | End: 2020-02-05 | Stop reason: HOSPADM

## 2020-02-04 RX ORDER — ACETAMINOPHEN 325 MG/1
650 TABLET ORAL ONCE
Status: COMPLETED | OUTPATIENT
Start: 2020-02-04 | End: 2020-02-04

## 2020-02-04 RX ORDER — PROMETHAZINE HYDROCHLORIDE 25 MG/1
25 TABLET ORAL EVERY 6 HOURS PRN
Qty: 60 TABLET | Refills: 3 | Status: SHIPPED | OUTPATIENT
Start: 2020-02-04 | End: 2020-06-01

## 2020-02-04 RX ORDER — SODIUM CHLORIDE 9 MG/ML
20 INJECTION, SOLUTION INTRAVENOUS ONCE
Status: COMPLETED | OUTPATIENT
Start: 2020-02-04 | End: 2020-02-04

## 2020-02-04 RX ORDER — PALONOSETRON 0.05 MG/ML
0.25 INJECTION, SOLUTION INTRAVENOUS ONCE
Status: COMPLETED | OUTPATIENT
Start: 2020-02-04 | End: 2020-02-04

## 2020-02-04 RX ORDER — DIPHENHYDRAMINE HYDROCHLORIDE 50 MG/ML
25 INJECTION INTRAMUSCULAR; INTRAVENOUS ONCE
Status: COMPLETED | OUTPATIENT
Start: 2020-02-04 | End: 2020-02-04

## 2020-02-04 RX ADMIN — Medication 10 ML: at 10:37

## 2020-02-04 RX ADMIN — PALONOSETRON 0.25 MG: 0.05 INJECTION, SOLUTION INTRAVENOUS at 10:41

## 2020-02-04 RX ADMIN — DEXAMETHASONE SODIUM PHOSPHATE 12 MG: 4 INJECTION, SOLUTION INTRAMUSCULAR; INTRAVENOUS at 10:51

## 2020-02-04 RX ADMIN — ACETAMINOPHEN 650 MG: 325 TABLET ORAL at 10:40

## 2020-02-04 RX ADMIN — DIPHENHYDRAMINE HYDROCHLORIDE 25 MG: 50 INJECTION INTRAMUSCULAR; INTRAVENOUS at 10:44

## 2020-02-04 RX ADMIN — RITUXIMAB 750 MG: 10 INJECTION, SOLUTION INTRAVENOUS at 11:49

## 2020-02-04 RX ADMIN — FOSAPREPITANT 150 MG: 150 INJECTION, POWDER, LYOPHILIZED, FOR SOLUTION INTRAVENOUS at 11:13

## 2020-02-04 RX ADMIN — Medication 10 ML: at 09:00

## 2020-02-04 RX ADMIN — Medication 10 ML: at 14:56

## 2020-02-04 RX ADMIN — BENDAMUSTINE HYDROCHLORIDE 175 MG: 25 INJECTION, SOLUTION INTRAVENOUS at 14:32

## 2020-02-04 RX ADMIN — Medication 500 UNITS: at 14:56

## 2020-02-04 RX ADMIN — SODIUM CHLORIDE 20 ML/HR: 9 INJECTION, SOLUTION INTRAVENOUS at 10:36

## 2020-02-04 ASSESSMENT — PAIN SCALES - GENERAL
PAINLEVEL_OUTOF10: 0
PAINLEVEL_OUTOF10: 0

## 2020-02-04 NOTE — PROGRESS NOTES
Patient assessed for the following post chemotherapy:    Dizziness   No  Lightheadedness  No      Acute nausea/vomiting No  Headache   No  Chest pain/pressure  No  Rash/itching   No  Shortness of breath  No    Patient kept for 20 minutes observation post infusion chemotherapy. Patient tolerated chemotherapy treatment with rituxin and bendamustine without any complications. Last vital signs:   BP (!) 154/84   Pulse 84   Temp 98.1 °F (36.7 °C) (Oral)   Resp 18   Ht 6' (1.829 m)   Wt (!) 343 lb 0.6 oz (155.6 kg)   SpO2 95%   BMI 46.52 kg/m²     Patient instructed if experience any of the above symptoms following today's infusion,he is to notify MD immediately or go to the emergency department. Discharge instructions given to patient. Verbalizes understanding. Ambulated off unit per self with belongings.

## 2020-02-04 NOTE — PROGRESS NOTES
Labs drawn via central venous catheter, then patient escorted to exam room accompanied by Jhon Teixeira

## 2020-02-04 NOTE — PROGRESS NOTES
Chemotherapy Administration    Pre-assessment Data: Antineoplastic Agents  Other:   See toxicity flow sheet for assessment [x]     Physician Notification of Concerns Related to Chemotherapy Administration:   Physician Notified Erlin Adamy / Time of Notification      Interventions:   Lab work assessed  [x]   Height / Weight verified for dose [x]   Current MAR reviewed [x]   Emergency drugs available as appropriate [x]   Anaphylaxis assessment completed [x]   Pre-medications administered as ordered [x]   Blood return noted upon initiation of chemotherapy [x]   Blood return noted each 1-2ml of a vesicant medication if given IV push []   Blood return noted each 2-3ml of a non-vesicant medication if given IV push []   Monitor for signs / symptoms of hypersensitivity reaction [x]   Chemotherapy orders (drug/dose/rate) verified by 2 Chemo certified RNs [x]   Monitor IV site and blood return throughout the infusion of the medication [x]   Document IV site checks on the IV assessment form [x]   Document chemotherapy teaching on the Patient Education tab [x]   Document patient verbalizes understanding of medications being administered [x]   If IV infiltration, see ONS Guidelines []   Other:      []         Rituxan Administration:  First Infusion:  initial infusion rate should be 50 mg/hr. If no infusion related problems, increase IV rate in 50mg/hr increments every 30 min. Maximum rate of infusion is 400mg/hr or as ordered by physician. Subsequent Infusions: initial rate of 100mg/hr, increase in 100mg/hr increments every 30 minutes. Maximum rate of infusion is 400mg/hr or as ordered by the physician. If hypersensitivity reaction occurs, STOP RITUXAN and maintain plain IV. Notify physician immediately for additional orders.    Pre-medication s administered as ordered [x]   Assess for history of angina or heart arrhythmias [x]   Hypertensive meds held for 12 hours prior to Rituxan administration [x]   Document blood pressure, pulse, respiratory rate at start of infusion [x]   Document blood pressure, pulse, respiratory rate every 30 min with each increase in IV rate [x]   With each increase in IV rate, document if a reaction is suspected [x]   Document blood pressure, pulse, respiratory rate at the completion of infusion [x]   Other:    []

## 2020-02-04 NOTE — PLAN OF CARE
Care plan reviewed with patient. Patient verbalized understanding of the plan of care and contribute to goal setting. Problem: Intellectual/Education/Knowledge Deficit  Goal: Teaching initiated upon admission  Outcome: Met This Shift  Note:   Patient verbalizes understanding to verbal information given on Rituxan and Bendeka,action and possible side effects. Aware to call MD if develop complications. Intervention: Verbal/written education provided  Note:   Chemotherapy Teaching     What is Chemotherapy   Drug action [x]   Method of Administration [x]   Handouts given []     Side Effects  Nausea/vomiting [x]   Diarrhea [x]   Fatigue [x]   Signs / Symptoms of infection [x]   Neutropenia [x]   Thrombocytopenia [x]   Alopecia [x]   neuropathy [x]   [place] diet &  the importance of fluids [x]       Micellaneous  Importance of nutrition [x]   Importance of oral hygiene [x]   When to call the MD [x]   Monitoring labs [x]   Use of supportive services []     Explanation of Drug Regimen / Frequency  Rituxan and Bendeka     Comments  Verbalized understanding to drug,action,side effects and when to call MD         Problem: Discharge Planning  Goal: Knowledge of discharge instructions  Description  Knowledge of discharge instructions     Outcome: Met This Shift  Note:   Verbalized understanding of discharge instructions, follow ups and when to call doctor   Intervention: Discharge to appropriate level of care  Note:   Discuss discharge instructions, follow ups and when to call doctor. Problem: Infection - Central Venous Catheter-Associated Bloodstream Infection:  Goal: Will show no infection signs and symptoms  Description  Will show no infection signs and symptoms  Outcome: Met This Shift  Note:   Mediport site with no redness or warmth. Skin over port intact with no signs of breakdown noted. Patient verbalizes signs/symptoms of port infection and when to notify the physician.     Intervention: Infection risk assessment  Description  Infection risk assessment  Note:   Discuss port maintenance, infection prevention, signs and when to call Dr      Problem: Falls - Risk of:  Goal: Will remain free from falls  Description  Will remain free from falls  Outcome: Met This Shift  Note:   Free from falls while in infusion center.  Verbalized understanding of fall prevention and to ask for assistance with ambulation   Intervention: Assess risk factors for falls  Description  Assess risk factors for falls  Note:   Assess for fall risk, instruct to ask for assistance with ambulation

## 2020-02-04 NOTE — PATIENT INSTRUCTIONS
1.  Chemotherapy today and tomorrow. 2.  CT scans prior to RTC. 3.  Labs on RTC. 3.  Chemotherapy on RTC.

## 2020-02-05 ENCOUNTER — HOSPITAL ENCOUNTER (OUTPATIENT)
Dept: INFUSION THERAPY | Age: 60
Discharge: HOME | End: 2020-02-05
Payer: COMMERCIAL

## 2020-02-05 VITALS
OXYGEN SATURATION: 96 % | TEMPERATURE: 98.1 F | HEART RATE: 74 BPM | SYSTOLIC BLOOD PRESSURE: 124 MMHG | RESPIRATION RATE: 18 BRPM | DIASTOLIC BLOOD PRESSURE: 71 MMHG

## 2020-02-05 DIAGNOSIS — Z51.11 ENCOUNTER FOR CHEMOTHERAPY MANAGEMENT: ICD-10-CM

## 2020-02-05 DIAGNOSIS — C82.58 DIFFUSE FOLLICLE CENTER LYMPHOMA OF LYMPH NODES OF MULTIPLE REGIONS (HCC): Primary | ICD-10-CM

## 2020-02-05 PROCEDURE — 96375 TX/PRO/DX INJ NEW DRUG ADDON: CPT

## 2020-02-05 PROCEDURE — 2580000003 HC RX 258: Performed by: INTERNAL MEDICINE

## 2020-02-05 PROCEDURE — 6360000002 HC RX W HCPCS: Performed by: INTERNAL MEDICINE

## 2020-02-05 PROCEDURE — 96409 CHEMO IV PUSH SNGL DRUG: CPT

## 2020-02-05 RX ORDER — DEXAMETHASONE SODIUM PHOSPHATE 4 MG/ML
8 INJECTION, SOLUTION INTRA-ARTICULAR; INTRALESIONAL; INTRAMUSCULAR; INTRAVENOUS; SOFT TISSUE ONCE
Status: COMPLETED | OUTPATIENT
Start: 2020-02-05 | End: 2020-02-05

## 2020-02-05 RX ORDER — SODIUM CHLORIDE 9 MG/ML
20 INJECTION, SOLUTION INTRAVENOUS ONCE
Status: COMPLETED | OUTPATIENT
Start: 2020-02-05 | End: 2020-02-05

## 2020-02-05 RX ORDER — HEPARIN SODIUM (PORCINE) LOCK FLUSH IV SOLN 100 UNIT/ML 100 UNIT/ML
500 SOLUTION INTRAVENOUS PRN
Status: DISCONTINUED | OUTPATIENT
Start: 2020-02-05 | End: 2020-02-06 | Stop reason: HOSPADM

## 2020-02-05 RX ORDER — SODIUM CHLORIDE 0.9 % (FLUSH) 0.9 %
10 SYRINGE (ML) INJECTION PRN
Status: DISCONTINUED | OUTPATIENT
Start: 2020-02-05 | End: 2020-02-06 | Stop reason: HOSPADM

## 2020-02-05 RX ADMIN — Medication 500 UNITS: at 14:16

## 2020-02-05 RX ADMIN — Medication 10 ML: at 14:16

## 2020-02-05 RX ADMIN — BENDAMUSTINE HYDROCHLORIDE 175 MG: 25 INJECTION, SOLUTION INTRAVENOUS at 13:47

## 2020-02-05 RX ADMIN — SODIUM CHLORIDE 20 ML/HR: 9 INJECTION, SOLUTION INTRAVENOUS at 13:35

## 2020-02-05 RX ADMIN — Medication 10 ML: at 13:35

## 2020-02-05 RX ADMIN — DEXAMETHASONE SODIUM PHOSPHATE 8 MG: 4 INJECTION, SOLUTION INTRAMUSCULAR; INTRAVENOUS at 13:38

## 2020-02-05 NOTE — PROGRESS NOTES
Patient assessed for the following post chemotherapy:    Dizziness   No  Lightheadedness  No      Acute nausea/vomiting No  Headache   No  Chest pain/pressure  No  Rash/itching   No  Shortness of breath  No    Patient kept for 20 minutes observation post infusion chemotherapy. Patient tolerated chemotherapy treatment bendeka without any complications. Last vital signs:   /71   Pulse 74   Temp 98.1 °F (36.7 °C) (Oral)   Resp 18   SpO2 96%       Patient instructed if experience any of the above symptoms following today's infusion,he is to notify MD immediately or go to the emergency department. Discharge instructions given to patient. Verbalizes understanding. Ambulated off unit per self with belongings.

## 2020-02-05 NOTE — PLAN OF CARE
Problem: Infection - Central Venous Catheter-Associated Bloodstream Infection:  Goal: Will show no infection signs and symptoms  Description  Will show no infection signs and symptoms  Note:   Mediport site with no redness or warmth. Skin over port intact with no signs of breakdown noted. Patient verbalizes signs/symptoms of port infection and when to notify the physician. Intervention: Infection risk assessment  Note:   Discussed port maintenance, infection prevention, signs and when to call the doctor     Problem: Musculor/Skeletal Functional Status  Goal: Absence of falls  Note:   Patient verbalizes understanding of fall precautions. Patient free from falls this visit. Intervention: Fall precautions  Note:   Discussed fall precautions, call light within reach. Problem: Intellectual/Education/Knowledge Deficit  Goal: Teaching initiated upon admission  Note:   Patient verbalizes understanding to verbal information given on bendeka,action and possible side effects. Aware to call MD if develop complications.    Intervention: Verbal/written education provided  Note:   Chemotherapy Teaching     What is Chemotherapy   Drug action [x]   Method of Administration [x]   Handouts given []     Side Effects  Nausea/vomiting [x]   Diarrhea [x]   Fatigue [x]   Signs / Symptoms of infection [x]   Neutropenia [x]   Thrombocytopenia [x]   Alopecia [x]   neuropathy [x]   [place] diet &  the importance of fluids [x]       Micellaneous  Importance of nutrition [x]   Importance of oral hygiene [x]   When to call the MD [x]   Monitoring labs [x]   Use of supportive services []     Explanation of Drug Regimen / Frequency  bendeka     Comments  Verbalized understanding to drug,action,side effects and when to call MD        Problem: Discharge Planning  Goal: Knowledge of discharge instructions  Description  Knowledge of discharge instructions     Note:   Verbalized understanding of discharge instructions, follow-up appointments, and when to call the physician. Intervention: Discharge to appropriate level of care  Note:   Discuss discharge instructions, follow ups, and when to call the doctor. Care plan reviewed with patient. Patient verbalizes understanding of the plan of care and contribute to goal setting.

## 2020-02-05 NOTE — PROGRESS NOTES
Chemotherapy Administration    Pre-assessment Data: Antineoplastic Agents  Other:   See toxicity flow sheet for assessment [x]     Physician Notification of Concerns Related to Chemotherapy Administration:   Physician Notified Shagufta Argueta / Time of Notification      Interventions:   Lab work assessed  [x]   Height / Weight verified for dose [x]   Current MAR reviewed [x]   Emergency drugs available as appropriate [x]   Anaphylaxis assessment completed [x]   Pre-medications administered as ordered [x]   Blood return noted upon initiation of chemotherapy [x]   Blood return noted each 1-2ml of a vesicant medication if given IV push []   Blood return noted each 2-3ml of a non-vesicant medication if given IV push []   Monitor for signs / symptoms of hypersensitivity reaction [x]   Chemotherapy orders (drug/dose/rate) verified by 2 Chemo certified RNs [x]   Monitor IV site and blood return throughout the infusion of the medication [x]   Document IV site checks on the IV assessment form [x]   Document chemotherapy teaching on the Patient Education tab [x]   Document patient verbalizes understanding of medications being administered [x]   If IV infiltration, see ONS Guidelines []   Other:      []

## 2020-03-03 ENCOUNTER — HOSPITAL ENCOUNTER (OUTPATIENT)
Dept: INFUSION THERAPY | Age: 60
Discharge: HOME | End: 2020-03-03
Payer: COMMERCIAL

## 2020-03-03 ENCOUNTER — OFFICE VISIT (OUTPATIENT)
Dept: ONCOLOGY | Age: 60
End: 2020-03-03
Payer: COMMERCIAL

## 2020-03-03 VITALS
HEART RATE: 90 BPM | WEIGHT: 315 LBS | DIASTOLIC BLOOD PRESSURE: 77 MMHG | SYSTOLIC BLOOD PRESSURE: 127 MMHG | HEIGHT: 72 IN | BODY MASS INDEX: 42.66 KG/M2 | RESPIRATION RATE: 18 BRPM | OXYGEN SATURATION: 94 % | TEMPERATURE: 97.7 F

## 2020-03-03 VITALS
WEIGHT: 315 LBS | SYSTOLIC BLOOD PRESSURE: 113 MMHG | DIASTOLIC BLOOD PRESSURE: 80 MMHG | TEMPERATURE: 97.8 F | HEART RATE: 95 BPM | RESPIRATION RATE: 20 BRPM | OXYGEN SATURATION: 94 % | HEIGHT: 72 IN | BODY MASS INDEX: 42.66 KG/M2

## 2020-03-03 DIAGNOSIS — Z51.11 ENCOUNTER FOR CHEMOTHERAPY MANAGEMENT: ICD-10-CM

## 2020-03-03 DIAGNOSIS — C82.58 DIFFUSE FOLLICLE CENTER LYMPHOMA OF LYMPH NODES OF MULTIPLE REGIONS (HCC): Primary | ICD-10-CM

## 2020-03-03 LAB
ALBUMIN SERPL-MCNC: 4 G/DL (ref 3.5–5.1)
ALP BLD-CCNC: 51 U/L (ref 38–126)
ALT SERPL-CCNC: 32 U/L (ref 11–66)
AST SERPL-CCNC: 30 U/L (ref 5–40)
BILIRUB SERPL-MCNC: 0.4 MG/DL (ref 0.3–1.2)
BILIRUBIN DIRECT: < 0.2 MG/DL (ref 0–0.3)
BUN BLDV-MCNC: 18 MG/DL (ref 7–22)
CHLORIDE, WHOLE BLOOD: 102 MEQ/L (ref 98–109)
CO2: 23 MEQ/L (ref 23–33)
CREATININE, WHOLE BLOOD: 1 MG/DL (ref 0.5–1.2)
GFR, ESTIMATED ,CON: 81 ML/MIN/1.73M2
GLUCOSE, WHOLE BLOOD: 190 MG/DL (ref 70–108)
HCT VFR BLD CALC: 44.1 % (ref 42–52)
HEMOGLOBIN: 14.9 GM/DL (ref 14–18)
IONIZED CALCIUM, WHOLE BLOOD: 1.19 MMOL/L (ref 1.12–1.32)
MCH RBC QN AUTO: 30 PG (ref 27–31)
MCHC RBC AUTO-ENTMCNC: 33.8 GM/DL (ref 33–37)
MCV RBC AUTO: 89 FL (ref 80–94)
PDW BLD-RTO: 13 % (ref 11.5–14.5)
PLATELET # BLD: 167 THOU/MM3 (ref 130–400)
PMV BLD AUTO: 8.1 FL (ref 7.4–10.4)
POTASSIUM, WHOLE BLOOD: 4 MEQ/L (ref 3.5–4.9)
RBC # BLD: 4.97 MILL/MM3 (ref 4.7–6.1)
SEG NEUTROPHILS: 70 % (ref 43–75)
SEGMENTED NEUTROPHILS ABSOLUTE COUNT: 5.1 THOU/MM3 (ref 1.8–7.7)
SODIUM, WHOLE BLOOD: 141 MEQ/L (ref 138–146)
TOTAL PROTEIN: 6.8 G/DL (ref 6.1–8)
WBC # BLD: 7.3 THOU/MM3 (ref 4.8–10.8)

## 2020-03-03 PROCEDURE — 96367 TX/PROPH/DG ADDL SEQ IV INF: CPT

## 2020-03-03 PROCEDURE — 82374 ASSAY BLOOD CARBON DIOXIDE: CPT

## 2020-03-03 PROCEDURE — 96411 CHEMO IV PUSH ADDL DRUG: CPT

## 2020-03-03 PROCEDURE — 84520 ASSAY OF UREA NITROGEN: CPT

## 2020-03-03 PROCEDURE — 3017F COLORECTAL CA SCREEN DOC REV: CPT | Performed by: INTERNAL MEDICINE

## 2020-03-03 PROCEDURE — 96375 TX/PRO/DX INJ NEW DRUG ADDON: CPT

## 2020-03-03 PROCEDURE — 36591 DRAW BLOOD OFF VENOUS DEVICE: CPT

## 2020-03-03 PROCEDURE — 6360000002 HC RX W HCPCS: Performed by: INTERNAL MEDICINE

## 2020-03-03 PROCEDURE — 96413 CHEMO IV INFUSION 1 HR: CPT

## 2020-03-03 PROCEDURE — G8427 DOCREV CUR MEDS BY ELIG CLIN: HCPCS | Performed by: INTERNAL MEDICINE

## 2020-03-03 PROCEDURE — 80047 BASIC METABLC PNL IONIZED CA: CPT

## 2020-03-03 PROCEDURE — 6370000000 HC RX 637 (ALT 250 FOR IP): Performed by: INTERNAL MEDICINE

## 2020-03-03 PROCEDURE — 1036F TOBACCO NON-USER: CPT | Performed by: INTERNAL MEDICINE

## 2020-03-03 PROCEDURE — G8417 CALC BMI ABV UP PARAM F/U: HCPCS | Performed by: INTERNAL MEDICINE

## 2020-03-03 PROCEDURE — 99211 OFF/OP EST MAY X REQ PHY/QHP: CPT

## 2020-03-03 PROCEDURE — 85027 COMPLETE CBC AUTOMATED: CPT

## 2020-03-03 PROCEDURE — 80076 HEPATIC FUNCTION PANEL: CPT

## 2020-03-03 PROCEDURE — G8484 FLU IMMUNIZE NO ADMIN: HCPCS | Performed by: INTERNAL MEDICINE

## 2020-03-03 PROCEDURE — 96415 CHEMO IV INFUSION ADDL HR: CPT

## 2020-03-03 PROCEDURE — 2580000003 HC RX 258: Performed by: INTERNAL MEDICINE

## 2020-03-03 PROCEDURE — 99215 OFFICE O/P EST HI 40 MIN: CPT | Performed by: INTERNAL MEDICINE

## 2020-03-03 RX ORDER — HEPARIN SODIUM (PORCINE) LOCK FLUSH IV SOLN 100 UNIT/ML 100 UNIT/ML
500 SOLUTION INTRAVENOUS PRN
Status: DISCONTINUED | OUTPATIENT
Start: 2020-03-03 | End: 2020-03-04 | Stop reason: HOSPADM

## 2020-03-03 RX ORDER — METHYLPREDNISOLONE SODIUM SUCCINATE 125 MG/2ML
125 INJECTION, POWDER, LYOPHILIZED, FOR SOLUTION INTRAMUSCULAR; INTRAVENOUS ONCE
Status: CANCELLED | OUTPATIENT
Start: 2020-03-03

## 2020-03-03 RX ORDER — SODIUM CHLORIDE 0.9 % (FLUSH) 0.9 %
10 SYRINGE (ML) INJECTION PRN
Status: CANCELLED | OUTPATIENT
Start: 2020-03-04

## 2020-03-03 RX ORDER — SODIUM CHLORIDE 9 MG/ML
INJECTION, SOLUTION INTRAVENOUS CONTINUOUS
Status: CANCELLED | OUTPATIENT
Start: 2020-03-03

## 2020-03-03 RX ORDER — ACETAMINOPHEN 325 MG/1
650 TABLET ORAL ONCE
Status: COMPLETED | OUTPATIENT
Start: 2020-03-03 | End: 2020-03-03

## 2020-03-03 RX ORDER — SODIUM CHLORIDE 0.9 % (FLUSH) 0.9 %
10 SYRINGE (ML) INJECTION PRN
Status: DISCONTINUED | OUTPATIENT
Start: 2020-03-03 | End: 2020-03-04 | Stop reason: HOSPADM

## 2020-03-03 RX ORDER — SODIUM CHLORIDE 9 MG/ML
INJECTION, SOLUTION INTRAVENOUS CONTINUOUS
Status: CANCELLED | OUTPATIENT
Start: 2020-03-04

## 2020-03-03 RX ORDER — SODIUM CHLORIDE 9 MG/ML
20 INJECTION, SOLUTION INTRAVENOUS ONCE
Status: CANCELLED | OUTPATIENT
Start: 2020-03-04

## 2020-03-03 RX ORDER — PALONOSETRON 0.05 MG/ML
0.25 INJECTION, SOLUTION INTRAVENOUS ONCE
Status: COMPLETED | OUTPATIENT
Start: 2020-03-03 | End: 2020-03-03

## 2020-03-03 RX ORDER — SODIUM CHLORIDE 9 MG/ML
20 INJECTION, SOLUTION INTRAVENOUS ONCE
Status: COMPLETED | OUTPATIENT
Start: 2020-03-03 | End: 2020-03-03

## 2020-03-03 RX ORDER — DIPHENHYDRAMINE HYDROCHLORIDE 50 MG/ML
25 INJECTION INTRAMUSCULAR; INTRAVENOUS ONCE
Status: COMPLETED | OUTPATIENT
Start: 2020-03-03 | End: 2020-03-03

## 2020-03-03 RX ORDER — SODIUM CHLORIDE 0.9 % (FLUSH) 0.9 %
5 SYRINGE (ML) INJECTION PRN
Status: CANCELLED | OUTPATIENT
Start: 2020-03-04

## 2020-03-03 RX ORDER — DEXAMETHASONE SODIUM PHOSPHATE 4 MG/ML
8 INJECTION, SOLUTION INTRA-ARTICULAR; INTRALESIONAL; INTRAMUSCULAR; INTRAVENOUS; SOFT TISSUE ONCE
Status: CANCELLED | OUTPATIENT
Start: 2020-03-04

## 2020-03-03 RX ORDER — MEPERIDINE HYDROCHLORIDE 25 MG/ML
12.5 INJECTION INTRAMUSCULAR; INTRAVENOUS; SUBCUTANEOUS ONCE
Status: CANCELLED | OUTPATIENT
Start: 2020-03-03

## 2020-03-03 RX ORDER — HEPARIN SODIUM (PORCINE) LOCK FLUSH IV SOLN 100 UNIT/ML 100 UNIT/ML
500 SOLUTION INTRAVENOUS PRN
Status: CANCELLED | OUTPATIENT
Start: 2020-03-04

## 2020-03-03 RX ORDER — DIPHENHYDRAMINE HYDROCHLORIDE 50 MG/ML
50 INJECTION INTRAMUSCULAR; INTRAVENOUS ONCE
Status: CANCELLED | OUTPATIENT
Start: 2020-03-03

## 2020-03-03 RX ORDER — METHYLPREDNISOLONE SODIUM SUCCINATE 125 MG/2ML
125 INJECTION, POWDER, LYOPHILIZED, FOR SOLUTION INTRAMUSCULAR; INTRAVENOUS ONCE
Status: CANCELLED | OUTPATIENT
Start: 2020-03-04

## 2020-03-03 RX ORDER — SODIUM CHLORIDE 0.9 % (FLUSH) 0.9 %
5 SYRINGE (ML) INJECTION PRN
Status: CANCELLED | OUTPATIENT
Start: 2020-03-03

## 2020-03-03 RX ORDER — DIPHENHYDRAMINE HYDROCHLORIDE 50 MG/ML
50 INJECTION INTRAMUSCULAR; INTRAVENOUS ONCE
Status: CANCELLED | OUTPATIENT
Start: 2020-03-04

## 2020-03-03 RX ADMIN — BENDAMUSTINE HYDROCHLORIDE 175 MG: 25 INJECTION, SOLUTION INTRAVENOUS at 13:00

## 2020-03-03 RX ADMIN — DEXAMETHASONE SODIUM PHOSPHATE 12 MG: 4 INJECTION, SOLUTION INTRAMUSCULAR; INTRAVENOUS at 09:17

## 2020-03-03 RX ADMIN — FOSAPREPITANT 150 MG: 150 INJECTION, POWDER, LYOPHILIZED, FOR SOLUTION INTRAVENOUS at 09:37

## 2020-03-03 RX ADMIN — PALONOSETRON 0.25 MG: 0.05 INJECTION, SOLUTION INTRAVENOUS at 09:12

## 2020-03-03 RX ADMIN — Medication 10 ML: at 13:32

## 2020-03-03 RX ADMIN — SODIUM CHLORIDE 20 ML/HR: 9 INJECTION, SOLUTION INTRAVENOUS at 09:10

## 2020-03-03 RX ADMIN — DIPHENHYDRAMINE HYDROCHLORIDE 25 MG: 50 INJECTION, SOLUTION INTRAMUSCULAR; INTRAVENOUS at 09:14

## 2020-03-03 RX ADMIN — Medication 10 ML: at 09:10

## 2020-03-03 RX ADMIN — Medication 10 ML: at 08:45

## 2020-03-03 RX ADMIN — ACETAMINOPHEN 650 MG: 325 TABLET ORAL at 09:11

## 2020-03-03 RX ADMIN — Medication 10 ML: at 08:46

## 2020-03-03 RX ADMIN — RITUXIMAB 750 MG: 10 INJECTION, SOLUTION INTRAVENOUS at 10:14

## 2020-03-03 RX ADMIN — Medication 500 UNITS: at 13:32

## 2020-03-03 ASSESSMENT — PAIN SCALES - GENERAL: PAINLEVEL_OUTOF10: 0

## 2020-03-03 NOTE — PROGRESS NOTES
Patient assessed for the following post chemotherapy:    Dizziness   No  Lightheadedness  No      Acute nausea/vomiting No  Headache   No  Chest pain/pressure  No  Rash/itching   No  Shortness of breath  No    Patient kept for 20 minutes observation post infusion chemotherapy. Patient tolerated chemotherapy treatment rituxan and bendeka without any complications. Last vital signs:   /77   Pulse 90   Temp 97.7 °F (36.5 °C) (Oral)   Resp 18   Ht 6' (1.829 m)   Wt (!) 348 lb 9.6 oz (158.1 kg)   SpO2 94%   BMI 47.28 kg/m²     Patient instructed if experience any of the above symptoms following today's infusion,he is to notify MD immediately or go to the emergency department. Discharge instructions given to patient. Verbalizes understanding. Ambulated off unit per self with belongings.

## 2020-03-03 NOTE — PROGRESS NOTES
Neurological: Negative   Hematological: Negative. Psychiatric/Behavioral: Negative. Objective:   Physical Exam  Vitals:    03/03/20 0845   BP: 113/80   Pulse: 95   Resp: 20   Temp: 97.8 °F (36.6 °C)   SpO2: 94%   Vitals reviewed and are stable. Constitutional: Well-developed and well-nourished. No acute distress. HENT: Normocephalic and atraumatic. Eyes: Pupils are equal and reactive. No scleral icterus. Neck: Overall appearance is symmetrical. No identifiable masses. Chest: Mediport in place in the upper chest. Appears stable. Pulmonary: Effort normal. No respiratory distress. Cardiovascular: RRR. No edema in any of the four extremities. Abdominal: Soft. No hepatomegaly or splenomegaly. Musculoskeletal: Gait is normal. Muscle strength and tone good. Neurological: Alert and oriented to person, place, and time. Judgment and thought content normal.  Skin: Skin is warm and dry. No rash. Psychiatric: Mood and affect appropriate for the clinical situation. Behavior is normal.        Data Analysis:    Hematology 3/3/2020 2/4/2020 1/7/2020   WBC 7.3 4.6 (L) 8.4   RBC 4.97 5.35 5.00   HGB 14.9 15.6 15.0   HCT 44.1 47.2 44.1   MCV 89 88 88   RDW 13.0 13.8 12.8    211 196     Assessment:   1. Follicular lymphoma stage III. 2.  Chemotherapy encounter. Plan:   1. Proceed with cycle #4 of chemotherapy treatment with Bendamustine and Rituxan. 2.  Monitor for toxicity related to chemotherapy treatment. 3.  Monitor for response of malignancy to chemotherapy treatment. Silvana Marin M.D.                                                                          Medical Director: [facility]  [facility]  [address], [address] [facility], [address] [place] [address], [address] [facility], [address] [place] [address] of the [facility] Alison MORATAYA at [facility]      **This report has been created using voice recognition software. It may contain minor errors which are inherent in voice recognition technology. **

## 2020-03-03 NOTE — PROGRESS NOTES
Chemotherapy Administration    Pre-assessment Data: Antineoplastic Agents  Other:   See toxicity flow sheet for assessment [x]     Physician Notification of Concerns Related to Chemotherapy Administration:   Physician Notified Samuel Dimas / Time of Notification Patient saw Dr Sabrina Michaels today     Interventions:   Lab work assessed  [x]   Height / Weight verified for dose [x]   Current MAR reviewed [x]   Emergency drugs available as appropriate [x]   Anaphylaxis assessment completed [x]   Pre-medications administered as ordered [x]   Blood return noted upon initiation of chemotherapy [x]   Blood return noted each 1-2ml of a vesicant medication if given IV push []   Blood return noted each 2-3ml of a non-vesicant medication if given IV push []   Monitor for signs / symptoms of hypersensitivity reaction [x]   Chemotherapy orders (drug/dose/rate) verified by 2 Chemo certified RNs [x]   Monitor IV site and blood return throughout the infusion of the medication [x]   Document IV site checks on the IV assessment form [x]   Document chemotherapy teaching on the Patient Education tab [x]   Document patient verbalizes understanding of medications being administered [x]   If IV infiltration, see ONS Guidelines []   Other:      []         Rituxan Administration:  First Infusion:  initial infusion rate should be 50 mg/hr. If no infusion related problems, increase IV rate in 50mg/hr increments every 30 min. Maximum rate of infusion is 400mg/hr or as ordered by physician. Subsequent Infusions: initial rate of 100mg/hr, increase in 100mg/hr increments every 30 minutes. Maximum rate of infusion is 400mg/hr or as ordered by the physician. If hypersensitivity reaction occurs, STOP RITUXAN and maintain plain IV. Notify physician immediately for additional orders.    Pre-medication s administered as ordered [x]   Assess for history of angina or heart arrhythmias [x]   Hypertensive meds held for 12 hours prior to Rituxan administration [x] Document blood pressure, pulse, respiratory rate at start of infusion [x]   Document blood pressure, pulse, respiratory rate every 30 min with each increase in IV rate [x]   With each increase in IV rate, document if a reaction is suspected [x]   Document blood pressure, pulse, respiratory rate at the completion of infusion [x]   Other:    []

## 2020-03-03 NOTE — PLAN OF CARE
Problem: Infection - Central Venous Catheter-Associated Bloodstream Infection:  Goal: Will show no infection signs and symptoms  Description  Will show no infection signs and symptoms  Outcome: Met This Shift  Note:   Mediport site with no redness or warmth. Skin over port intact with no signs of breakdown noted. Patient verbalizes signs/symptoms of port infection and when to notify the physician. Intervention: Infection risk assessment  Note:   Discussed port maintenance, infection prevention, signs and when to call the doctor     Problem: Musculor/Skeletal Functional Status  Goal: Absence of falls  Outcome: Met This Shift  Note:   Patient verbalizes understanding of fall precautions. Patient free from falls this visit. Intervention: Fall precautions  Note:   Discussed fall precautions, call light within reach. Problem: Intellectual/Education/Knowledge Deficit  Goal: Teaching initiated upon admission  Outcome: Met This Shift  Note:   Patient verbalizes understanding to verbal information given on rituxan and bendeka,action and possible side effects. Aware to call MD if develop complications.    Intervention: Verbal/written education provided  Note:   Chemotherapy Teaching     What is Chemotherapy   Drug action [x]   Method of Administration [x]   Handouts given []     Side Effects  Nausea/vomiting [x]   Diarrhea [x]   Fatigue [x]   Signs / Symptoms of infection [x]   Neutropenia [x]   Thrombocytopenia [x]   Alopecia [x]   neuropathy [x]   [place] diet &  the importance of fluids [x]       Micellaneous  Importance of nutrition [x]   Importance of oral hygiene [x]   When to call the MD [x]   Monitoring labs [x]   Use of supportive services []     Explanation of Drug Regimen / Frequency  Rituxan and bendeka     Comments  Verbalized understanding to drug,action,side effects and when to call MD        Problem: Discharge Planning  Goal: Knowledge of discharge instructions  Description  Knowledge of discharge instructions     Outcome: Met This Shift  Note:   Verbalized understanding of discharge instructions, follow-up appointments, and when to call the physician. Intervention: Discharge to appropriate level of care  Note:   Discuss discharge instructions, follow ups, and when to call the doctor. Care plan reviewed with patient. Patient verbalizes understanding of the plan of care and contribute to goal setting.

## 2020-03-04 ENCOUNTER — HOSPITAL ENCOUNTER (OUTPATIENT)
Dept: INFUSION THERAPY | Age: 60
Discharge: HOME | End: 2020-03-04
Payer: COMMERCIAL

## 2020-03-04 VITALS
SYSTOLIC BLOOD PRESSURE: 118 MMHG | TEMPERATURE: 97.4 F | HEART RATE: 75 BPM | DIASTOLIC BLOOD PRESSURE: 68 MMHG | OXYGEN SATURATION: 98 % | RESPIRATION RATE: 18 BRPM

## 2020-03-04 DIAGNOSIS — Z51.11 ENCOUNTER FOR CHEMOTHERAPY MANAGEMENT: ICD-10-CM

## 2020-03-04 DIAGNOSIS — C82.58 DIFFUSE FOLLICLE CENTER LYMPHOMA OF LYMPH NODES OF MULTIPLE REGIONS (HCC): Primary | ICD-10-CM

## 2020-03-04 PROCEDURE — 6360000002 HC RX W HCPCS: Performed by: INTERNAL MEDICINE

## 2020-03-04 PROCEDURE — 96409 CHEMO IV PUSH SNGL DRUG: CPT

## 2020-03-04 PROCEDURE — 96413 CHEMO IV INFUSION 1 HR: CPT

## 2020-03-04 PROCEDURE — 96375 TX/PRO/DX INJ NEW DRUG ADDON: CPT

## 2020-03-04 PROCEDURE — 2580000003 HC RX 258: Performed by: INTERNAL MEDICINE

## 2020-03-04 RX ORDER — DEXAMETHASONE SODIUM PHOSPHATE 4 MG/ML
8 INJECTION, SOLUTION INTRA-ARTICULAR; INTRALESIONAL; INTRAMUSCULAR; INTRAVENOUS; SOFT TISSUE ONCE
Status: COMPLETED | OUTPATIENT
Start: 2020-03-04 | End: 2020-03-04

## 2020-03-04 RX ORDER — SODIUM CHLORIDE 9 MG/ML
20 INJECTION, SOLUTION INTRAVENOUS ONCE
Status: COMPLETED | OUTPATIENT
Start: 2020-03-04 | End: 2020-03-04

## 2020-03-04 RX ORDER — HEPARIN SODIUM (PORCINE) LOCK FLUSH IV SOLN 100 UNIT/ML 100 UNIT/ML
500 SOLUTION INTRAVENOUS PRN
Status: DISCONTINUED | OUTPATIENT
Start: 2020-03-04 | End: 2020-03-05 | Stop reason: HOSPADM

## 2020-03-04 RX ORDER — SODIUM CHLORIDE 0.9 % (FLUSH) 0.9 %
10 SYRINGE (ML) INJECTION PRN
Status: DISCONTINUED | OUTPATIENT
Start: 2020-03-04 | End: 2020-03-05 | Stop reason: HOSPADM

## 2020-03-04 RX ADMIN — Medication 10 ML: at 14:19

## 2020-03-04 RX ADMIN — Medication 500 UNITS: at 14:18

## 2020-03-04 RX ADMIN — SODIUM CHLORIDE 20 ML/HR: 9 INJECTION, SOLUTION INTRAVENOUS at 13:35

## 2020-03-04 RX ADMIN — DEXAMETHASONE SODIUM PHOSPHATE 8 MG: 4 INJECTION, SOLUTION INTRA-ARTICULAR; INTRALESIONAL; INTRAMUSCULAR; INTRAVENOUS; SOFT TISSUE at 13:35

## 2020-03-04 RX ADMIN — BENDAMUSTINE HYDROCHLORIDE 175 MG: 25 INJECTION, SOLUTION INTRAVENOUS at 13:44

## 2020-03-04 RX ADMIN — Medication 10 ML: at 13:35

## 2020-03-04 NOTE — PLAN OF CARE
Problem: Musculor/Skeletal Functional Status  Goal: Absence of falls  Outcome: Met This Shift  Note:   No falls this admissiom  Intervention: Fall precautions  Note:   Patient aware of fall precautions for here and at home -call light in reach while here       Problem: Intellectual/Education/Knowledge Deficit  Goal: Teaching initiated upon admission  Outcome: Met This Shift  Note:   Chemotherapy Teaching     What is Chemotherapy   Drug action [x]   Method of Administration [x]   Handouts given []     Side Effects  Nausea/vomiting [x]   Diarrhea [x]   Fatigue [x]   Signs / Symptoms of infection [x]   Neutropenia [x]   Thrombocytopenia [x]   Alopecia [x]   neuropathy [x]   [place] diet &  the importance of fluids [x]       Micellaneous  Importance of nutrition [x]   Importance of oral hygiene [x]   When to call the MD [x]   Monitoring labs [x]   Use of supportive services []     Explanation of Drug Regimen / Frequency  Bendamustine     Comments  Verbalized understanding to drug,action,side effects and when to call MD      Goal: Written Disposition Instruction form completed  Outcome: Met This Shift  Note:   Discharge instructions given and reviewed with patient. All questions answered. Patient verbalized understanding   Intervention: Verbal/written education provided  Note:   Discharge instruction sheets     Problem: Discharge Planning  Goal: Knowledge of discharge instructions  Description  Knowledge of discharge instructions     Outcome: Met This Shift  Note:   Patient  able to teach back follow up appointments and when to call the doctor.  Patient offers no questions at this time   Intervention: Interaction with patient/family and care team  Note:   Patient  currently denies any needs or concerns    Intervention: Discharge to appropriate level of care  Note:   Discharge instruction sheets     Problem: Infection - Central Venous Catheter-Associated Bloodstream Infection:  Goal: Will show no infection signs and symptoms  Description  Will show no infection signs and symptoms  Outcome: Met This Shift  Note:   No signs of infection noted at [facility] site    Intervention: Central line needs assessment  Note:    Patient currently under going chemotherapy with poor venous access   Intervention: Infection risk assessment  Note:   Patient aware that is of increased risk for infection due to receiving chemotherapy and having a central venous catheter.  Patient aware of signs and symptoms of infection and when to call the doctor

## 2020-03-04 NOTE — PROGRESS NOTES
Patient assessed for the following post chemotherapy:    Dizziness   No  Lightheadedness  No      Acute nausea/vomiting No  Headache   No  Chest pain/pressure  No  Rash/itching   No  Shortness of breath  No    Patient kept for 20 minutes observation post infusion chemotherapy. Patient tolerated chemotherapy treatment with bendeka without any complications. Last vital signs:   /68   Pulse 75   Temp 97.4 °F (36.3 °C) (Oral)   Resp 18   SpO2 98%         Patient instructed if experience any of the above symptoms following today's infusion,he is to notify MD immediately or go to the emergency department. Discharge instructions given to patient. Verbalizes understanding. Ambulated off unit per self with belongings.

## 2020-03-04 NOTE — PROGRESS NOTES
Chemotherapy Administration    Pre-assessment Data: Antineoplastic Agents  Other:   See toxicity flow sheet for assessment [x]     Physician Notification of Concerns Related to Chemotherapy Administration:   Physician Notified Squire Malady / Time of Notification      Interventions:   Lab work assessed  [x]   Height / Weight verified for dose [x]   Current MAR reviewed [x]   Emergency drugs available as appropriate [x]   Anaphylaxis assessment completed [x]   Pre-medications administered as ordered [x]   Blood return noted upon initiation of chemotherapy [x]   Blood return noted each 1-2ml of a vesicant medication if given IV push []   Blood return noted each 2-3ml of a non-vesicant medication if given IV push []   Monitor for signs / symptoms of hypersensitivity reaction [x]   Chemotherapy orders (drug/dose/rate) verified by 2 Chemo certified RNs [x]   Monitor IV site and blood return throughout the infusion of the medication [x]   Document IV site checks on the IV assessment form [x]   Document chemotherapy teaching on the Patient Education tab [x]   Document patient verbalizes understanding of medications being administered [x]   If IV infiltration, see ONS Guidelines []   Other:      []

## 2020-03-30 RX ORDER — DEXAMETHASONE SODIUM PHOSPHATE 4 MG/ML
8 INJECTION, SOLUTION INTRA-ARTICULAR; INTRALESIONAL; INTRAMUSCULAR; INTRAVENOUS; SOFT TISSUE ONCE
Status: CANCELLED | OUTPATIENT
Start: 2020-04-01

## 2020-03-30 RX ORDER — DIPHENHYDRAMINE HYDROCHLORIDE 50 MG/ML
50 INJECTION INTRAMUSCULAR; INTRAVENOUS ONCE
Status: CANCELLED | OUTPATIENT
Start: 2020-03-31

## 2020-03-30 RX ORDER — MEPERIDINE HYDROCHLORIDE 25 MG/ML
12.5 INJECTION INTRAMUSCULAR; INTRAVENOUS; SUBCUTANEOUS ONCE
Status: CANCELLED | OUTPATIENT
Start: 2020-03-31

## 2020-03-30 RX ORDER — METHYLPREDNISOLONE SODIUM SUCCINATE 125 MG/2ML
125 INJECTION, POWDER, LYOPHILIZED, FOR SOLUTION INTRAMUSCULAR; INTRAVENOUS ONCE
Status: CANCELLED | OUTPATIENT
Start: 2020-04-01

## 2020-03-30 RX ORDER — SODIUM CHLORIDE 0.9 % (FLUSH) 0.9 %
5 SYRINGE (ML) INJECTION PRN
Status: CANCELLED | OUTPATIENT
Start: 2020-04-01

## 2020-03-30 RX ORDER — SODIUM CHLORIDE 0.9 % (FLUSH) 0.9 %
5 SYRINGE (ML) INJECTION PRN
Status: CANCELLED | OUTPATIENT
Start: 2020-03-31

## 2020-03-30 RX ORDER — METHYLPREDNISOLONE SODIUM SUCCINATE 125 MG/2ML
125 INJECTION, POWDER, LYOPHILIZED, FOR SOLUTION INTRAMUSCULAR; INTRAVENOUS ONCE
Status: CANCELLED | OUTPATIENT
Start: 2020-03-31

## 2020-03-30 RX ORDER — HEPARIN SODIUM (PORCINE) LOCK FLUSH IV SOLN 100 UNIT/ML 100 UNIT/ML
500 SOLUTION INTRAVENOUS PRN
Status: CANCELLED | OUTPATIENT
Start: 2020-04-01

## 2020-03-30 RX ORDER — SODIUM CHLORIDE 9 MG/ML
INJECTION, SOLUTION INTRAVENOUS CONTINUOUS
Status: CANCELLED | OUTPATIENT
Start: 2020-03-31

## 2020-03-30 RX ORDER — SODIUM CHLORIDE 0.9 % (FLUSH) 0.9 %
10 SYRINGE (ML) INJECTION PRN
Status: CANCELLED | OUTPATIENT
Start: 2020-04-01

## 2020-03-30 RX ORDER — SODIUM CHLORIDE 9 MG/ML
20 INJECTION, SOLUTION INTRAVENOUS ONCE
Status: CANCELLED | OUTPATIENT
Start: 2020-04-01

## 2020-03-30 RX ORDER — DIPHENHYDRAMINE HYDROCHLORIDE 50 MG/ML
50 INJECTION INTRAMUSCULAR; INTRAVENOUS ONCE
Status: CANCELLED | OUTPATIENT
Start: 2020-04-01

## 2020-03-30 RX ORDER — SODIUM CHLORIDE 9 MG/ML
INJECTION, SOLUTION INTRAVENOUS CONTINUOUS
Status: CANCELLED | OUTPATIENT
Start: 2020-04-01

## 2020-03-31 ENCOUNTER — HOSPITAL ENCOUNTER (OUTPATIENT)
Dept: INFUSION THERAPY | Age: 60
Discharge: HOME | End: 2020-03-31
Payer: COMMERCIAL

## 2020-03-31 ENCOUNTER — OFFICE VISIT (OUTPATIENT)
Dept: ONCOLOGY | Age: 60
End: 2020-03-31
Payer: COMMERCIAL

## 2020-03-31 VITALS
TEMPERATURE: 98.2 F | RESPIRATION RATE: 18 BRPM | HEIGHT: 72 IN | DIASTOLIC BLOOD PRESSURE: 89 MMHG | BODY MASS INDEX: 42.66 KG/M2 | WEIGHT: 315 LBS | SYSTOLIC BLOOD PRESSURE: 123 MMHG | HEART RATE: 105 BPM | OXYGEN SATURATION: 94 %

## 2020-03-31 VITALS
RESPIRATION RATE: 18 BRPM | TEMPERATURE: 98.1 F | DIASTOLIC BLOOD PRESSURE: 63 MMHG | SYSTOLIC BLOOD PRESSURE: 127 MMHG | HEART RATE: 84 BPM | OXYGEN SATURATION: 93 %

## 2020-03-31 DIAGNOSIS — Z51.11 ENCOUNTER FOR CHEMOTHERAPY MANAGEMENT: ICD-10-CM

## 2020-03-31 DIAGNOSIS — C82.58 DIFFUSE FOLLICLE CENTER LYMPHOMA OF LYMPH NODES OF MULTIPLE REGIONS (HCC): Primary | ICD-10-CM

## 2020-03-31 PROBLEM — R11.0 CHRONIC NAUSEA: Status: ACTIVE | Noted: 2020-03-31

## 2020-03-31 LAB
ALBUMIN SERPL-MCNC: 4.1 G/DL (ref 3.5–5.1)
ALP BLD-CCNC: 58 U/L (ref 38–126)
ALT SERPL-CCNC: 34 U/L (ref 11–66)
AST SERPL-CCNC: 26 U/L (ref 5–40)
BILIRUB SERPL-MCNC: 0.4 MG/DL (ref 0.3–1.2)
BILIRUBIN DIRECT: < 0.2 MG/DL (ref 0–0.3)
BUN BLDV-MCNC: 20 MG/DL (ref 7–22)
CHLORIDE, WHOLE BLOOD: 103 MEQ/L (ref 98–109)
CO2: 23 MEQ/L (ref 23–33)
CREATININE, WHOLE BLOOD: 0.9 MG/DL (ref 0.5–1.2)
GFR, ESTIMATED ,CON: > 90 ML/MIN/1.73M2
GLUCOSE, WHOLE BLOOD: 150 MG/DL (ref 70–108)
HCT VFR BLD CALC: 44.9 % (ref 42–52)
HEMOGLOBIN: 15.1 GM/DL (ref 14–18)
IONIZED CALCIUM, WHOLE BLOOD: 1.21 MMOL/L (ref 1.12–1.32)
LD: 148 U/L (ref 100–190)
MCH RBC QN AUTO: 30.4 PG (ref 27–31)
MCHC RBC AUTO-ENTMCNC: 33.6 GM/DL (ref 33–37)
MCV RBC AUTO: 90 FL (ref 80–94)
PDW BLD-RTO: 13.2 % (ref 11.5–14.5)
PLATELET # BLD: 203 THOU/MM3 (ref 130–400)
PMV BLD AUTO: 7.4 FL (ref 7.4–10.4)
POTASSIUM, WHOLE BLOOD: 3.9 MEQ/L (ref 3.5–4.9)
RBC # BLD: 4.97 MILL/MM3 (ref 4.7–6.1)
SEG NEUTROPHILS: 69 % (ref 43–75)
SEGMENTED NEUTROPHILS ABSOLUTE COUNT: 5.7 THOU/MM3 (ref 1.8–7.7)
SODIUM, WHOLE BLOOD: 140 MEQ/L (ref 138–146)
TOTAL PROTEIN: 7 G/DL (ref 6.1–8)
WBC # BLD: 8.3 THOU/MM3 (ref 4.8–10.8)

## 2020-03-31 PROCEDURE — 96411 CHEMO IV PUSH ADDL DRUG: CPT

## 2020-03-31 PROCEDURE — 96415 CHEMO IV INFUSION ADDL HR: CPT

## 2020-03-31 PROCEDURE — 84520 ASSAY OF UREA NITROGEN: CPT

## 2020-03-31 PROCEDURE — G8427 DOCREV CUR MEDS BY ELIG CLIN: HCPCS | Performed by: INTERNAL MEDICINE

## 2020-03-31 PROCEDURE — G8484 FLU IMMUNIZE NO ADMIN: HCPCS | Performed by: INTERNAL MEDICINE

## 2020-03-31 PROCEDURE — 6370000000 HC RX 637 (ALT 250 FOR IP): Performed by: INTERNAL MEDICINE

## 2020-03-31 PROCEDURE — 6360000002 HC RX W HCPCS: Performed by: INTERNAL MEDICINE

## 2020-03-31 PROCEDURE — 96413 CHEMO IV INFUSION 1 HR: CPT

## 2020-03-31 PROCEDURE — 3017F COLORECTAL CA SCREEN DOC REV: CPT | Performed by: INTERNAL MEDICINE

## 2020-03-31 PROCEDURE — 99211 OFF/OP EST MAY X REQ PHY/QHP: CPT

## 2020-03-31 PROCEDURE — 80047 BASIC METABLC PNL IONIZED CA: CPT

## 2020-03-31 PROCEDURE — 2580000003 HC RX 258: Performed by: INTERNAL MEDICINE

## 2020-03-31 PROCEDURE — 1036F TOBACCO NON-USER: CPT | Performed by: INTERNAL MEDICINE

## 2020-03-31 PROCEDURE — 36591 DRAW BLOOD OFF VENOUS DEVICE: CPT

## 2020-03-31 PROCEDURE — 85027 COMPLETE CBC AUTOMATED: CPT

## 2020-03-31 PROCEDURE — 82374 ASSAY BLOOD CARBON DIOXIDE: CPT

## 2020-03-31 PROCEDURE — 80076 HEPATIC FUNCTION PANEL: CPT

## 2020-03-31 PROCEDURE — 99215 OFFICE O/P EST HI 40 MIN: CPT | Performed by: INTERNAL MEDICINE

## 2020-03-31 PROCEDURE — 96367 TX/PROPH/DG ADDL SEQ IV INF: CPT

## 2020-03-31 PROCEDURE — 83615 LACTATE (LD) (LDH) ENZYME: CPT

## 2020-03-31 PROCEDURE — 96375 TX/PRO/DX INJ NEW DRUG ADDON: CPT

## 2020-03-31 PROCEDURE — G8417 CALC BMI ABV UP PARAM F/U: HCPCS | Performed by: INTERNAL MEDICINE

## 2020-03-31 RX ORDER — SODIUM CHLORIDE 0.9 % (FLUSH) 0.9 %
10 SYRINGE (ML) INJECTION PRN
Status: DISCONTINUED | OUTPATIENT
Start: 2020-03-31 | End: 2020-04-01 | Stop reason: HOSPADM

## 2020-03-31 RX ORDER — HEPARIN SODIUM (PORCINE) LOCK FLUSH IV SOLN 100 UNIT/ML 100 UNIT/ML
500 SOLUTION INTRAVENOUS PRN
Status: DISCONTINUED | OUTPATIENT
Start: 2020-03-31 | End: 2020-04-01 | Stop reason: HOSPADM

## 2020-03-31 RX ORDER — ACETAMINOPHEN 325 MG/1
650 TABLET ORAL ONCE
Status: COMPLETED | OUTPATIENT
Start: 2020-03-31 | End: 2020-03-31

## 2020-03-31 RX ORDER — PROCHLORPERAZINE MALEATE 10 MG
10 TABLET ORAL EVERY 6 HOURS PRN
Qty: 120 TABLET | Refills: 3 | Status: SHIPPED | OUTPATIENT
Start: 2020-03-31 | End: 2021-12-01 | Stop reason: ALTCHOICE

## 2020-03-31 RX ORDER — SODIUM CHLORIDE 9 MG/ML
20 INJECTION, SOLUTION INTRAVENOUS ONCE
Status: COMPLETED | OUTPATIENT
Start: 2020-03-31 | End: 2020-03-31

## 2020-03-31 RX ORDER — PALONOSETRON 0.05 MG/ML
0.25 INJECTION, SOLUTION INTRAVENOUS ONCE
Status: COMPLETED | OUTPATIENT
Start: 2020-03-31 | End: 2020-03-31

## 2020-03-31 RX ORDER — DIPHENHYDRAMINE HYDROCHLORIDE 50 MG/ML
25 INJECTION INTRAMUSCULAR; INTRAVENOUS ONCE
Status: COMPLETED | OUTPATIENT
Start: 2020-03-31 | End: 2020-03-31

## 2020-03-31 RX ADMIN — Medication 10 ML: at 08:40

## 2020-03-31 RX ADMIN — FOSAPREPITANT 150 MG: 150 INJECTION, POWDER, LYOPHILIZED, FOR SOLUTION INTRAVENOUS at 10:27

## 2020-03-31 RX ADMIN — PALONOSETRON 0.25 MG: 0.05 INJECTION, SOLUTION INTRAVENOUS at 10:03

## 2020-03-31 RX ADMIN — Medication 500 UNITS: at 14:17

## 2020-03-31 RX ADMIN — SODIUM CHLORIDE 20 ML/HR: 9 INJECTION, SOLUTION INTRAVENOUS at 10:02

## 2020-03-31 RX ADMIN — BENDAMUSTINE HYDROCHLORIDE 175 MG: 25 INJECTION, SOLUTION INTRAVENOUS at 13:50

## 2020-03-31 RX ADMIN — DEXAMETHASONE SODIUM PHOSPHATE 12 MG: 4 INJECTION, SOLUTION INTRAMUSCULAR; INTRAVENOUS at 10:08

## 2020-03-31 RX ADMIN — DIPHENHYDRAMINE HYDROCHLORIDE 25 MG: 50 INJECTION, SOLUTION INTRAMUSCULAR; INTRAVENOUS at 10:05

## 2020-03-31 RX ADMIN — Medication 10 ML: at 14:17

## 2020-03-31 RX ADMIN — Medication 10 ML: at 10:02

## 2020-03-31 RX ADMIN — ACETAMINOPHEN 650 MG: 325 TABLET ORAL at 10:02

## 2020-03-31 RX ADMIN — RITUXIMAB 750 MG: 10 INJECTION, SOLUTION INTRAVENOUS at 11:03

## 2020-03-31 ASSESSMENT — PAIN SCALES - GENERAL: PAINLEVEL_OUTOF10: 0

## 2020-03-31 NOTE — PROGRESS NOTES
Labs drawn via central venous catheter, then patient escorted to exam room accompanied by Rani Perez

## 2020-03-31 NOTE — PROGRESS NOTES
Chemotherapy Administration    Pre-assessment Data: Antineoplastic Agents  Other:   See toxicity flow sheet for assessment [x]     Physician Notification of Concerns Related to Chemotherapy Administration:   Physician Notified Jihan Lewis / Time of Notification      Interventions:   Lab work assessed  [x]   Height / Weight verified for dose [x]   Current MAR reviewed [x]   Emergency drugs available as appropriate [x]   Anaphylaxis assessment completed [x]   Pre-medications administered as ordered [x]   Blood return noted upon initiation of chemotherapy [x]   Blood return noted each 1-2ml of a vesicant medication if given IV push []   Blood return noted each 2-3ml of a non-vesicant medication if given IV push []   Monitor for signs / symptoms of hypersensitivity reaction [x]   Chemotherapy orders (drug/dose/rate) verified by 2 Chemo certified RNs [x]   Monitor IV site and blood return throughout the infusion of the medication [x]   Document IV site checks on the IV assessment form [x]   Document chemotherapy teaching on the Patient Education tab [x]   Document patient verbalizes understanding of medications being administered [x]   If IV infiltration, see ONS Guidelines []   Other:      []         Rituxan Administration:  First Infusion:  initial infusion rate should be 50 mg/hr. If no infusion related problems, increase IV rate in 50mg/hr increments every 30 min. Maximum rate of infusion is 400mg/hr or as ordered by physician. Subsequent Infusions: initial rate of 100mg/hr, increase in 100mg/hr increments every 30 minutes. Maximum rate of infusion is 400mg/hr or as ordered by the physician. If hypersensitivity reaction occurs, STOP RITUXAN and maintain plain IV. Notify physician immediately for additional orders.    Pre-medication s administered as ordered [x]   Assess for history of angina or heart arrhythmias [x]   Hypertensive meds held for 12 hours prior to Rituxan administration [x]   Document blood pressure, pulse, respiratory rate at start of infusion [x]   Document blood pressure, pulse, respiratory rate every 30 min with each increase in IV rate [x]   With each increase in IV rate, document if a reaction is suspected [x]   Document blood pressure, pulse, respiratory rate at the completion of infusion [x]   Other:    []

## 2020-03-31 NOTE — PLAN OF CARE
Problem: Musculor/Skeletal Functional Status  Goal: Absence of falls  Outcome: Met This Shift  Note: No falls this admission   Intervention: Fall precautions  Note: Patient aware of fall precautions for here and at home -call light in reach while here       Problem: Intellectual/Education/Knowledge Deficit  Goal: Teaching initiated upon admission  Outcome: Met This Shift  Note:   Chemotherapy Teaching     What is Chemotherapy   Drug action [x]   Method of Administration [x]   Handouts given []     Side Effects  Nausea/vomiting [x]   Diarrhea [x]   Fatigue [x]   Signs / Symptoms of infection [x]   Neutropenia [x]   Thrombocytopenia [x]   Alopecia [x]   neuropathy [x]   [place] diet &  the importance of fluids [x]       Micellaneous  Importance of nutrition [x]   Importance of oral hygiene [x]   When to call the MD [x]   Monitoring labs [x]   Use of supportive services []     Explanation of Drug   Rituxin and bendeka     Comments  Verbalized understanding to drug,action,side effects and when to call MD      Goal: Written Disposition Instruction form completed  Outcome: Met This Shift  Note: Discharge instructions given and reviewed with patient. All questions answered. Patient verbalized understanding   Intervention: Verbal/written education provided  Note: Discharge instructions given and reviewed with patient. All questions answered. Patient verbalized understanding      Problem: Discharge Planning  Goal: Knowledge of discharge instructions  Description: Knowledge of discharge instructions     Outcome: Met This Shift  Note: Patient  able to teach back follow up appointments and when to call the doctor.  Patient offers no questions at this time   Intervention: Interaction with patient/family and care team  Note: Patient  currently denies any needs or concerns    Intervention: Discharge to appropriate level of care  Note: Discharge home     Problem: Infection - Central Venous Catheter-Associated Bloodstream Infection:  Goal: Will show no infection signs and symptoms  Description: Will show no infection signs and symptoms  Outcome: Met This Shift  Note: No signs of infection noted at [facility] site    Intervention: Central line needs assessment  Note:  Patient currently under going chemotherapy with poor venous access   Intervention: Infection risk assessment  Note: Patient aware that is of increased risk for infection due to receiving chemotherapy and having a central venous catheter. Patient aware of signs and symptoms of infection and when to call the doctor    Care plan reviewed with patient and he verbalized understanding of the plan of care and contributed to goal setting.

## 2020-03-31 NOTE — PROGRESS NOTES
Patient assessed for the following post chemotherapy:    Dizziness   No  Lightheadedness  No      Acute nausea/vomiting No  Headache   No  Chest pain/pressure  No  Rash/itching   No  Shortness of breath  No    Patient kept for 20 minutes observation post infusion chemotherapy. Patient tolerated chemotherapy treatment with rituxin and bendeka without any complications. Last vital signs:   /63   Pulse 84   Temp 98.1 °F (36.7 °C) (Oral)   Resp 18   SpO2 93%         Patient instructed if experience any of the above symptoms following today's infusion,he is to notify MD immediately or go to the emergency department. Discharge instructions given to patient. Verbalizes understanding. Ambulated off unit per self  with belongings.

## 2020-04-01 ENCOUNTER — HOSPITAL ENCOUNTER (OUTPATIENT)
Dept: INFUSION THERAPY | Age: 60
Discharge: HOME | End: 2020-04-01
Payer: COMMERCIAL

## 2020-04-01 VITALS
WEIGHT: 315 LBS | SYSTOLIC BLOOD PRESSURE: 151 MMHG | RESPIRATION RATE: 18 BRPM | TEMPERATURE: 97.5 F | BODY MASS INDEX: 42.66 KG/M2 | HEART RATE: 78 BPM | HEIGHT: 72 IN | DIASTOLIC BLOOD PRESSURE: 74 MMHG | OXYGEN SATURATION: 97 %

## 2020-04-01 DIAGNOSIS — Z51.11 ENCOUNTER FOR CHEMOTHERAPY MANAGEMENT: ICD-10-CM

## 2020-04-01 DIAGNOSIS — C82.58 DIFFUSE FOLLICLE CENTER LYMPHOMA OF LYMPH NODES OF MULTIPLE REGIONS (HCC): Primary | ICD-10-CM

## 2020-04-01 PROCEDURE — 96375 TX/PRO/DX INJ NEW DRUG ADDON: CPT

## 2020-04-01 PROCEDURE — 6360000002 HC RX W HCPCS: Performed by: INTERNAL MEDICINE

## 2020-04-01 PROCEDURE — 96409 CHEMO IV PUSH SNGL DRUG: CPT

## 2020-04-01 PROCEDURE — 2580000003 HC RX 258: Performed by: INTERNAL MEDICINE

## 2020-04-01 RX ORDER — DEXAMETHASONE SODIUM PHOSPHATE 4 MG/ML
8 INJECTION, SOLUTION INTRA-ARTICULAR; INTRALESIONAL; INTRAMUSCULAR; INTRAVENOUS; SOFT TISSUE ONCE
Status: COMPLETED | OUTPATIENT
Start: 2020-04-01 | End: 2020-04-01

## 2020-04-01 RX ORDER — SODIUM CHLORIDE 0.9 % (FLUSH) 0.9 %
10 SYRINGE (ML) INJECTION PRN
Status: DISCONTINUED | OUTPATIENT
Start: 2020-04-01 | End: 2020-04-02 | Stop reason: HOSPADM

## 2020-04-01 RX ORDER — SODIUM CHLORIDE 9 MG/ML
20 INJECTION, SOLUTION INTRAVENOUS ONCE
Status: COMPLETED | OUTPATIENT
Start: 2020-04-01 | End: 2020-04-01

## 2020-04-01 RX ORDER — HEPARIN SODIUM (PORCINE) LOCK FLUSH IV SOLN 100 UNIT/ML 100 UNIT/ML
500 SOLUTION INTRAVENOUS PRN
Status: DISCONTINUED | OUTPATIENT
Start: 2020-04-01 | End: 2020-04-02 | Stop reason: HOSPADM

## 2020-04-01 RX ADMIN — Medication 10 ML: at 13:08

## 2020-04-01 RX ADMIN — BENDAMUSTINE HYDROCHLORIDE 175 MG: 25 INJECTION, SOLUTION INTRAVENOUS at 13:18

## 2020-04-01 RX ADMIN — DEXAMETHASONE SODIUM PHOSPHATE 8 MG: 4 INJECTION, SOLUTION INTRA-ARTICULAR; INTRALESIONAL; INTRAMUSCULAR; INTRAVENOUS; SOFT TISSUE at 13:09

## 2020-04-01 RX ADMIN — Medication 500 UNITS: at 13:49

## 2020-04-01 RX ADMIN — Medication 10 ML: at 13:49

## 2020-04-01 RX ADMIN — SODIUM CHLORIDE 20 ML/HR: 9 INJECTION, SOLUTION INTRAVENOUS at 13:08

## 2020-04-01 NOTE — PROGRESS NOTES
Patient assessed for the following post chemotherapy:    Dizziness   No  Lightheadedness  No      Acute nausea/vomiting No  Headache   No  Chest pain/pressure  No  Rash/itching   No  Shortness of breath  No    Patient kept for 20 minutes observation post infusion chemotherapy. Patient tolerated chemotherapy treatment bendeka without any complications. Last vital signs:   BP (!) 151/74   Pulse 78   Temp 97.5 °F (36.4 °C) (Oral)   Resp 18   Ht 6' (1.829 m)   Wt (!) 345 lb 12.8 oz (156.9 kg)   SpO2 97%   BMI 46.90 kg/m²     Patient instructed if experience any of the above symptoms following today's infusion,he/she is to notify MD immediately or go to the emergency department. Discharge instructions given to patient. Verbalizes understanding. Ambulated off unit per self with belongings.

## 2020-04-01 NOTE — PROGRESS NOTES
Chemotherapy Administration    Pre-assessment Data: Antineoplastic Agents  Other:   See toxicity flow sheet for assessment [x]     Physician Notification of Concerns Related to Chemotherapy Administration:   Physician Notified Danniee Snare / Time of Notification      Interventions:   Lab work assessed  [x]   Height / Weight verified for dose [x]   Current MAR reviewed [x]   Emergency drugs available as appropriate [x]   Anaphylaxis assessment completed [x]   Pre-medications administered as ordered [x]   Blood return noted upon initiation of chemotherapy [x]   Blood return noted each 1-2ml of a vesicant medication if given IV push [x]   Blood return noted each 2-3ml of a non-vesicant medication if given IV push []   Monitor for signs / symptoms of hypersensitivity reaction [x]   Chemotherapy orders (drug/dose/rate) verified by 2 Chemo certified RNs [x]   Monitor IV site and blood return throughout the infusion of the medication [x]   Document IV site checks on the IV assessment form [x]   Document chemotherapy teaching on the Patient Education tab [x]   Document patient verbalizes understanding of medications being administered [x]   If IV infiltration, see ONS Guidelines []   Other:      []

## 2020-04-27 RX ORDER — SODIUM CHLORIDE 0.9 % (FLUSH) 0.9 %
5 SYRINGE (ML) INJECTION PRN
Status: CANCELLED | OUTPATIENT
Start: 2020-04-28

## 2020-04-27 RX ORDER — METHYLPREDNISOLONE SODIUM SUCCINATE 125 MG/2ML
125 INJECTION, POWDER, LYOPHILIZED, FOR SOLUTION INTRAMUSCULAR; INTRAVENOUS ONCE
Status: CANCELLED | OUTPATIENT
Start: 2020-04-29

## 2020-04-27 RX ORDER — SODIUM CHLORIDE 9 MG/ML
INJECTION, SOLUTION INTRAVENOUS CONTINUOUS
Status: CANCELLED | OUTPATIENT
Start: 2020-04-29

## 2020-04-27 RX ORDER — DIPHENHYDRAMINE HYDROCHLORIDE 50 MG/ML
50 INJECTION INTRAMUSCULAR; INTRAVENOUS ONCE
Status: CANCELLED | OUTPATIENT
Start: 2020-04-28

## 2020-04-27 RX ORDER — MEPERIDINE HYDROCHLORIDE 25 MG/ML
12.5 INJECTION INTRAMUSCULAR; INTRAVENOUS; SUBCUTANEOUS ONCE
Status: CANCELLED | OUTPATIENT
Start: 2020-04-28

## 2020-04-27 RX ORDER — SODIUM CHLORIDE 0.9 % (FLUSH) 0.9 %
5 SYRINGE (ML) INJECTION PRN
Status: CANCELLED | OUTPATIENT
Start: 2020-04-29

## 2020-04-27 RX ORDER — DEXAMETHASONE SODIUM PHOSPHATE 4 MG/ML
8 INJECTION, SOLUTION INTRA-ARTICULAR; INTRALESIONAL; INTRAMUSCULAR; INTRAVENOUS; SOFT TISSUE ONCE
Status: CANCELLED | OUTPATIENT
Start: 2020-04-29

## 2020-04-27 RX ORDER — METHYLPREDNISOLONE SODIUM SUCCINATE 125 MG/2ML
125 INJECTION, POWDER, LYOPHILIZED, FOR SOLUTION INTRAMUSCULAR; INTRAVENOUS ONCE
Status: CANCELLED | OUTPATIENT
Start: 2020-04-28

## 2020-04-27 RX ORDER — DIPHENHYDRAMINE HYDROCHLORIDE 50 MG/ML
50 INJECTION INTRAMUSCULAR; INTRAVENOUS ONCE
Status: CANCELLED | OUTPATIENT
Start: 2020-04-29

## 2020-04-27 RX ORDER — SODIUM CHLORIDE 9 MG/ML
20 INJECTION, SOLUTION INTRAVENOUS ONCE
Status: CANCELLED | OUTPATIENT
Start: 2020-04-29

## 2020-04-27 RX ORDER — HEPARIN SODIUM (PORCINE) LOCK FLUSH IV SOLN 100 UNIT/ML 100 UNIT/ML
500 SOLUTION INTRAVENOUS PRN
Status: CANCELLED | OUTPATIENT
Start: 2020-04-29

## 2020-04-27 RX ORDER — SODIUM CHLORIDE 9 MG/ML
INJECTION, SOLUTION INTRAVENOUS CONTINUOUS
Status: CANCELLED | OUTPATIENT
Start: 2020-04-28

## 2020-04-27 RX ORDER — SODIUM CHLORIDE 0.9 % (FLUSH) 0.9 %
10 SYRINGE (ML) INJECTION PRN
Status: CANCELLED | OUTPATIENT
Start: 2020-04-29

## 2020-04-28 ENCOUNTER — OFFICE VISIT (OUTPATIENT)
Dept: ONCOLOGY | Age: 60
End: 2020-04-28
Payer: COMMERCIAL

## 2020-04-28 ENCOUNTER — HOSPITAL ENCOUNTER (OUTPATIENT)
Dept: INFUSION THERAPY | Age: 60
Discharge: HOME | End: 2020-04-28
Payer: COMMERCIAL

## 2020-04-28 VITALS
RESPIRATION RATE: 16 BRPM | BODY MASS INDEX: 42.66 KG/M2 | WEIGHT: 315 LBS | OXYGEN SATURATION: 94 % | DIASTOLIC BLOOD PRESSURE: 88 MMHG | HEART RATE: 98 BPM | SYSTOLIC BLOOD PRESSURE: 140 MMHG | HEIGHT: 72 IN | TEMPERATURE: 98.1 F

## 2020-04-28 VITALS
RESPIRATION RATE: 16 BRPM | TEMPERATURE: 98.2 F | SYSTOLIC BLOOD PRESSURE: 119 MMHG | HEART RATE: 91 BPM | OXYGEN SATURATION: 93 % | DIASTOLIC BLOOD PRESSURE: 78 MMHG

## 2020-04-28 DIAGNOSIS — Z51.11 ENCOUNTER FOR CHEMOTHERAPY MANAGEMENT: ICD-10-CM

## 2020-04-28 DIAGNOSIS — C82.58 DIFFUSE FOLLICLE CENTER LYMPHOMA OF LYMPH NODES OF MULTIPLE REGIONS (HCC): Primary | ICD-10-CM

## 2020-04-28 LAB
ALBUMIN SERPL-MCNC: 3.9 G/DL (ref 3.5–5.1)
ALP BLD-CCNC: 58 U/L (ref 38–126)
ALT SERPL-CCNC: 28 U/L (ref 11–66)
AST SERPL-CCNC: 31 U/L (ref 5–40)
BILIRUB SERPL-MCNC: 0.3 MG/DL (ref 0.3–1.2)
BILIRUBIN DIRECT: < 0.2 MG/DL (ref 0–0.3)
BUN BLDV-MCNC: 21 MG/DL (ref 7–22)
CHLORIDE, WHOLE BLOOD: 103 MEQ/L (ref 98–109)
CO2: 25 MEQ/L (ref 23–33)
CREATININE, WHOLE BLOOD: 0.9 MG/DL (ref 0.5–1.2)
GFR, ESTIMATED ,CON: > 90 ML/MIN/1.73M2
GLUCOSE, WHOLE BLOOD: 179 MG/DL (ref 70–108)
HCT VFR BLD CALC: 41.6 % (ref 42–52)
HEMOGLOBIN: 14.1 GM/DL (ref 14–18)
IONIZED CALCIUM, WHOLE BLOOD: 1.19 MMOL/L (ref 1.12–1.32)
LD: 283 U/L (ref 100–190)
MCH RBC QN AUTO: 29.8 PG (ref 26–33)
MCHC RBC AUTO-ENTMCNC: 33.9 GM/DL (ref 32.2–35.5)
MCV RBC AUTO: 88 FL (ref 80–94)
PDW BLD-RTO: 13.6 % (ref 11.5–14.5)
PLATELET # BLD: 178 THOU/MM3 (ref 130–400)
PMV BLD AUTO: 10 FL (ref 9.4–12.4)
POTASSIUM, WHOLE BLOOD: 4 MEQ/L (ref 3.5–4.9)
RBC # BLD: 4.73 MILL/MM3 (ref 4.7–6.1)
SEG NEUTROPHILS: 63 % (ref 43–75)
SEGMENTED NEUTROPHILS ABSOLUTE COUNT: 3.7 THOU/MM3 (ref 1.8–7.7)
SODIUM, WHOLE BLOOD: 140 MEQ/L (ref 138–146)
TOTAL PROTEIN: 6.7 G/DL (ref 6.1–8)
WBC # BLD: 5.9 THOU/MM3 (ref 4.8–10.8)

## 2020-04-28 PROCEDURE — 1036F TOBACCO NON-USER: CPT | Performed by: INTERNAL MEDICINE

## 2020-04-28 PROCEDURE — 99211 OFF/OP EST MAY X REQ PHY/QHP: CPT

## 2020-04-28 PROCEDURE — 96413 CHEMO IV INFUSION 1 HR: CPT

## 2020-04-28 PROCEDURE — 82374 ASSAY BLOOD CARBON DIOXIDE: CPT

## 2020-04-28 PROCEDURE — 99215 OFFICE O/P EST HI 40 MIN: CPT | Performed by: INTERNAL MEDICINE

## 2020-04-28 PROCEDURE — 2580000003 HC RX 258: Performed by: INTERNAL MEDICINE

## 2020-04-28 PROCEDURE — 96415 CHEMO IV INFUSION ADDL HR: CPT

## 2020-04-28 PROCEDURE — G8427 DOCREV CUR MEDS BY ELIG CLIN: HCPCS | Performed by: INTERNAL MEDICINE

## 2020-04-28 PROCEDURE — 96375 TX/PRO/DX INJ NEW DRUG ADDON: CPT

## 2020-04-28 PROCEDURE — 85027 COMPLETE CBC AUTOMATED: CPT

## 2020-04-28 PROCEDURE — 36591 DRAW BLOOD OFF VENOUS DEVICE: CPT

## 2020-04-28 PROCEDURE — 80047 BASIC METABLC PNL IONIZED CA: CPT

## 2020-04-28 PROCEDURE — 96367 TX/PROPH/DG ADDL SEQ IV INF: CPT

## 2020-04-28 PROCEDURE — G8417 CALC BMI ABV UP PARAM F/U: HCPCS | Performed by: INTERNAL MEDICINE

## 2020-04-28 PROCEDURE — 6360000002 HC RX W HCPCS: Performed by: INTERNAL MEDICINE

## 2020-04-28 PROCEDURE — 3017F COLORECTAL CA SCREEN DOC REV: CPT | Performed by: INTERNAL MEDICINE

## 2020-04-28 PROCEDURE — 96411 CHEMO IV PUSH ADDL DRUG: CPT

## 2020-04-28 PROCEDURE — 80076 HEPATIC FUNCTION PANEL: CPT

## 2020-04-28 PROCEDURE — 83615 LACTATE (LD) (LDH) ENZYME: CPT

## 2020-04-28 PROCEDURE — 84520 ASSAY OF UREA NITROGEN: CPT

## 2020-04-28 PROCEDURE — 6370000000 HC RX 637 (ALT 250 FOR IP): Performed by: INTERNAL MEDICINE

## 2020-04-28 RX ORDER — SODIUM CHLORIDE 9 MG/ML
20 INJECTION, SOLUTION INTRAVENOUS ONCE
Status: COMPLETED | OUTPATIENT
Start: 2020-04-28 | End: 2020-04-28

## 2020-04-28 RX ORDER — HEPARIN SODIUM (PORCINE) LOCK FLUSH IV SOLN 100 UNIT/ML 100 UNIT/ML
500 SOLUTION INTRAVENOUS PRN
Status: DISCONTINUED | OUTPATIENT
Start: 2020-04-28 | End: 2020-04-29 | Stop reason: HOSPADM

## 2020-04-28 RX ORDER — SODIUM CHLORIDE 0.9 % (FLUSH) 0.9 %
10 SYRINGE (ML) INJECTION PRN
Status: DISCONTINUED | OUTPATIENT
Start: 2020-04-28 | End: 2020-04-29 | Stop reason: HOSPADM

## 2020-04-28 RX ORDER — PALONOSETRON 0.05 MG/ML
0.25 INJECTION, SOLUTION INTRAVENOUS ONCE
Status: COMPLETED | OUTPATIENT
Start: 2020-04-28 | End: 2020-04-28

## 2020-04-28 RX ORDER — ACETAMINOPHEN 325 MG/1
650 TABLET ORAL ONCE
Status: COMPLETED | OUTPATIENT
Start: 2020-04-28 | End: 2020-04-28

## 2020-04-28 RX ORDER — DIPHENHYDRAMINE HYDROCHLORIDE 50 MG/ML
25 INJECTION INTRAMUSCULAR; INTRAVENOUS ONCE
Status: COMPLETED | OUTPATIENT
Start: 2020-04-28 | End: 2020-04-28

## 2020-04-28 RX ADMIN — RITUXIMAB 750 MG: 10 INJECTION, SOLUTION INTRAVENOUS at 11:35

## 2020-04-28 RX ADMIN — Medication 10 ML: at 14:47

## 2020-04-28 RX ADMIN — BENDAMUSTINE HYDROCHLORIDE 175 MG: 25 INJECTION, SOLUTION INTRAVENOUS at 14:18

## 2020-04-28 RX ADMIN — ACETAMINOPHEN 650 MG: 325 TABLET ORAL at 10:34

## 2020-04-28 RX ADMIN — DIPHENHYDRAMINE HYDROCHLORIDE 25 MG: 50 INJECTION, SOLUTION INTRAMUSCULAR; INTRAVENOUS at 10:34

## 2020-04-28 RX ADMIN — Medication 500 UNITS: at 14:47

## 2020-04-28 RX ADMIN — SODIUM CHLORIDE 150 MG: 9 INJECTION, SOLUTION INTRAVENOUS at 10:58

## 2020-04-28 RX ADMIN — PALONOSETRON 0.25 MG: 0.05 INJECTION, SOLUTION INTRAVENOUS at 10:32

## 2020-04-28 RX ADMIN — SODIUM CHLORIDE 20 ML/HR: 9 INJECTION, SOLUTION INTRAVENOUS at 10:30

## 2020-04-28 RX ADMIN — Medication 10 ML: at 09:01

## 2020-04-28 RX ADMIN — Medication 10 ML: at 09:00

## 2020-04-28 RX ADMIN — Medication 10 ML: at 10:30

## 2020-04-28 RX ADMIN — DEXAMETHASONE SODIUM PHOSPHATE 12 MG: 4 INJECTION, SOLUTION INTRAMUSCULAR; INTRAVENOUS at 10:38

## 2020-04-28 ASSESSMENT — PAIN SCALES - GENERAL: PAINLEVEL_OUTOF10: 0

## 2020-04-28 NOTE — PROGRESS NOTES
Chemotherapy Administration    Pre-assessment Data: Antineoplastic Agents  Other:   See toxicity flow sheet for assessment [x]     Physician Notification of Concerns Related to Chemotherapy Administration:   Physician Notified Michael Amas / Time of Notification Patient saw Dr Rito Ledezma today     Interventions:   Lab work assessed  [x]   Height / Weight verified for dose [x]   Current MAR reviewed [x]   Emergency drugs available as appropriate [x]   Anaphylaxis assessment completed [x]   Pre-medications administered as ordered [x]   Blood return noted upon initiation of chemotherapy [x]   Blood return noted each 1-2ml of a vesicant medication if given IV push []   Blood return noted each 2-3ml of a non-vesicant medication if given IV push []   Monitor for signs / symptoms of hypersensitivity reaction [x]   Chemotherapy orders (drug/dose/rate) verified by 2 Chemo certified RNs [x]   Monitor IV site and blood return throughout the infusion of the medication [x]   Document IV site checks on the IV assessment form [x]   Document chemotherapy teaching on the Patient Education tab [x]   Document patient verbalizes understanding of medications being administered [x]   If IV infiltration, see ONS Guidelines []   Other:      []         Rituxan Administration:  First Infusion:  initial infusion rate should be 50 mg/hr. If no infusion related problems, increase IV rate in 50mg/hr increments every 30 min. Maximum rate of infusion is 400mg/hr or as ordered by physician. Subsequent Infusions: initial rate of 100mg/hr, increase in 100mg/hr increments every 30 minutes. Maximum rate of infusion is 400mg/hr or as ordered by the physician. If hypersensitivity reaction occurs, STOP RITUXAN and maintain plain IV. Notify physician immediately for additional orders.    Pre-medication s administered as ordered [x]   Assess for history of angina or heart arrhythmias [x]   Hypertensive meds held for 12 hours prior to Rituxan administration [x] Document blood pressure, pulse, respiratory rate at start of infusion [x]   Document blood pressure, pulse, respiratory rate every 30 min with each increase in IV rate [x]   With each increase in IV rate, document if a reaction is suspected [x]   Document blood pressure, pulse, respiratory rate at the completion of infusion [x]   Other:    []

## 2020-04-28 NOTE — PROGRESS NOTES
Patient assessed for the following post chemotherapy:    Dizziness   No  Lightheadedness  No      Acute nausea/vomiting No  Headache   No  Chest pain/pressure  No  Rash/itching   No  Shortness of breath  No    Patient kept for 20 minutes observation post infusion chemotherapy. Patient tolerated chemotherapy treatment rituxan and bendeka without any complications. Last vital signs:   /78   Pulse 91   Temp 98.2 °F (36.8 °C) (Oral)   Resp 16   SpO2 93%     Patient instructed if experience any of the above symptoms following today's infusion,he is to notify MD immediately or go to the emergency department. Discharge instructions given to patient. Verbalizes understanding. Ambulated off unit per self with belongings.

## 2020-04-28 NOTE — PROGRESS NOTES
[facility] CENTER  [facility]  [facility] [address] [facility]  [address] [address]  Dept: [phone]  Dept Fax: [phone]: [phone]     Subjective:      Chief Complaint:  Nabil Pizano is a 62 y.o. with lymphoma. HPI:  The patient is here today for follow-up regarding his history of non-Hodgkin's lymphoma. He is now completed five cycles of chemotherapy treatment with Bendamustine and Rituxan. His main toxicity related to chemotherapy treatment has been chronic low-grade nausea. He currently is using Phenergan, Compazine, and Zofran as a combination of antinausea medications. He states over the past four weeks his nausea has been improved. The patient has not had fever, shortness of breath, cough or other signs of infection. His bowel and bladder habits have been stable. After this cycle of chemotherapy treatment a PET/CT scan will be completed to evaluate the status of his malignancy. The patient remains active and has a good performance status at ECOG level 0. He has no signs or symptoms that was just progression of his malignancy. PMH, SH, and FH:  I reviewed the patients medication list and allergy list as noted on the electronic medical record. The PMH, SH and FH were also reviewed as noted on the EMR. Review of Systems  Constitutional: Negative. HENT: Negative. Eyes: Negative. Respiratory: Negative. Cardiovascular: Negative. Gastrointestinal: Nausea. Genitourinary: Negative. Musculoskeletal: Negative. Skin: Negative. Neurological: Negative. Hematological: Negative. Psychiatric/Behavioral: Negative. Objective:   Physical Exam  Vitals:    04/28/20 0915   BP: (!) 140/88   Pulse: 98   Resp: 16   Temp: 98.1 °F (36.7 °C)   SpO2: 94%   Vitals reviewed and are stable. Constitutional: Well-developed and well-nourished. No acute distress.

## 2020-04-28 NOTE — PATIENT INSTRUCTIONS
1.  Chemotherapy today and tomorrow. 2.  PET/CT scan prior to RTC. 3.  Labs on RTC. 4.  NO chemotherapy on RTC.

## 2020-04-28 NOTE — PLAN OF CARE

## 2020-04-29 ENCOUNTER — HOSPITAL ENCOUNTER (OUTPATIENT)
Dept: INFUSION THERAPY | Age: 60
Discharge: HOME | End: 2020-04-29
Payer: COMMERCIAL

## 2020-04-29 VITALS
DIASTOLIC BLOOD PRESSURE: 82 MMHG | RESPIRATION RATE: 18 BRPM | TEMPERATURE: 98 F | SYSTOLIC BLOOD PRESSURE: 135 MMHG | OXYGEN SATURATION: 95 % | HEART RATE: 74 BPM

## 2020-04-29 DIAGNOSIS — Z51.11 ENCOUNTER FOR CHEMOTHERAPY MANAGEMENT: ICD-10-CM

## 2020-04-29 DIAGNOSIS — C82.58 DIFFUSE FOLLICLE CENTER LYMPHOMA OF LYMPH NODES OF MULTIPLE REGIONS (HCC): Primary | ICD-10-CM

## 2020-04-29 PROCEDURE — 96409 CHEMO IV PUSH SNGL DRUG: CPT

## 2020-04-29 PROCEDURE — 6360000002 HC RX W HCPCS: Performed by: INTERNAL MEDICINE

## 2020-04-29 PROCEDURE — 96375 TX/PRO/DX INJ NEW DRUG ADDON: CPT

## 2020-04-29 PROCEDURE — 2580000003 HC RX 258: Performed by: INTERNAL MEDICINE

## 2020-04-29 RX ORDER — HEPARIN SODIUM (PORCINE) LOCK FLUSH IV SOLN 100 UNIT/ML 100 UNIT/ML
500 SOLUTION INTRAVENOUS PRN
Status: DISCONTINUED | OUTPATIENT
Start: 2020-04-29 | End: 2020-04-30 | Stop reason: HOSPADM

## 2020-04-29 RX ORDER — SODIUM CHLORIDE 9 MG/ML
20 INJECTION, SOLUTION INTRAVENOUS ONCE
Status: COMPLETED | OUTPATIENT
Start: 2020-04-29 | End: 2020-04-29

## 2020-04-29 RX ORDER — SODIUM CHLORIDE 0.9 % (FLUSH) 0.9 %
10 SYRINGE (ML) INJECTION PRN
Status: DISCONTINUED | OUTPATIENT
Start: 2020-04-29 | End: 2020-04-30 | Stop reason: HOSPADM

## 2020-04-29 RX ORDER — DEXAMETHASONE SODIUM PHOSPHATE 4 MG/ML
8 INJECTION, SOLUTION INTRA-ARTICULAR; INTRALESIONAL; INTRAMUSCULAR; INTRAVENOUS; SOFT TISSUE ONCE
Status: COMPLETED | OUTPATIENT
Start: 2020-04-29 | End: 2020-04-29

## 2020-04-29 RX ADMIN — BENDAMUSTINE HYDROCHLORIDE 175 MG: 25 INJECTION, SOLUTION INTRAVENOUS at 13:36

## 2020-04-29 RX ADMIN — Medication 10 ML: at 14:04

## 2020-04-29 RX ADMIN — Medication 500 UNITS: at 14:04

## 2020-04-29 RX ADMIN — SODIUM CHLORIDE 20 ML/HR: 9 INJECTION, SOLUTION INTRAVENOUS at 13:15

## 2020-04-29 RX ADMIN — Medication 10 ML: at 13:15

## 2020-04-29 RX ADMIN — DEXAMETHASONE SODIUM PHOSPHATE 8 MG: 4 INJECTION, SOLUTION INTRA-ARTICULAR; INTRALESIONAL; INTRAMUSCULAR; INTRAVENOUS; SOFT TISSUE at 13:16

## 2020-04-29 NOTE — PROGRESS NOTES
Chemotherapy Administration    Pre-assessment Data: Antineoplastic Agents  Other:   See toxicity flow sheet for assessment [x]     Physician Notification of Concerns Related to Chemotherapy Administration:   Physician Notified Daniel Bishop / Time of Notification      Interventions:   Lab work assessed  [x]   Height / Weight verified for dose [x]   Current MAR reviewed [x]   Emergency drugs available as appropriate [x]   Anaphylaxis assessment completed [x]   Pre-medications administered as ordered [x]   Blood return noted upon initiation of chemotherapy [x]   Blood return noted each 1-2ml of a vesicant medication if given IV push []   Blood return noted each 2-3ml of a non-vesicant medication if given IV push []   Monitor for signs / symptoms of hypersensitivity reaction [x]   Chemotherapy orders (drug/dose/rate) verified by 2 Chemo certified RNs [x]   Monitor IV site and blood return throughout the infusion of the medication [x]   Document IV site checks on the IV assessment form [x]   Document chemotherapy teaching on the Patient Education tab [x]   Document patient verbalizes understanding of medications being administered [x]   If IV infiltration, see ONS Guidelines []   Other:      []

## 2020-04-29 NOTE — PROGRESS NOTES
Patient assessed for the following post chemotherapy:    Dizziness   No  Lightheadedness  No      Acute nausea/vomiting No  Headache   No  Chest pain/pressure  No  Rash/itching   No  Shortness of breath  No    Patient kept for 20 minutes observation post infusion chemotherapy. Patient tolerated chemotherapy treatment bendeka without any complications. Last vital signs:   /82   Pulse 74   Temp 98 °F (36.7 °C) (Oral)   Resp 18   SpO2 95%     Patient instructed if experience any of the above symptoms following today's infusion,he is to notify MD immediately or go to the emergency department. Discharge instructions given to patient. Verbalizes understanding. Ambulated off unit per self with belongings.

## 2020-04-29 NOTE — PLAN OF CARE

## 2020-06-01 RX ORDER — PROMETHAZINE HYDROCHLORIDE 25 MG/1
TABLET ORAL
Qty: 60 TABLET | Refills: 3 | Status: ON HOLD | OUTPATIENT
Start: 2020-06-01 | End: 2020-10-05

## 2020-06-09 ENCOUNTER — HOSPITAL ENCOUNTER (OUTPATIENT)
Dept: INFUSION THERAPY | Age: 60
Discharge: HOME | End: 2020-06-09
Payer: COMMERCIAL

## 2020-06-09 ENCOUNTER — OFFICE VISIT (OUTPATIENT)
Dept: ONCOLOGY | Age: 60
End: 2020-06-09
Payer: COMMERCIAL

## 2020-06-09 VITALS
DIASTOLIC BLOOD PRESSURE: 72 MMHG | BODY MASS INDEX: 42.66 KG/M2 | OXYGEN SATURATION: 93 % | TEMPERATURE: 98.3 F | WEIGHT: 315 LBS | HEIGHT: 72 IN | RESPIRATION RATE: 18 BRPM | HEART RATE: 97 BPM | SYSTOLIC BLOOD PRESSURE: 137 MMHG

## 2020-06-09 VITALS
DIASTOLIC BLOOD PRESSURE: 72 MMHG | HEIGHT: 72 IN | OXYGEN SATURATION: 93 % | RESPIRATION RATE: 18 BRPM | WEIGHT: 315 LBS | TEMPERATURE: 98.3 F | BODY MASS INDEX: 42.66 KG/M2 | HEART RATE: 97 BPM | SYSTOLIC BLOOD PRESSURE: 137 MMHG

## 2020-06-09 DIAGNOSIS — Z51.11 ENCOUNTER FOR CHEMOTHERAPY MANAGEMENT: ICD-10-CM

## 2020-06-09 DIAGNOSIS — C82.58 DIFFUSE FOLLICLE CENTER LYMPHOMA OF LYMPH NODES OF MULTIPLE REGIONS (HCC): Primary | ICD-10-CM

## 2020-06-09 LAB
ABSOLUTE IMMATURE GRANULOCYTE: 0.05 THOU/MM3 (ref 0–0.07)
ALBUMIN SERPL-MCNC: 3.6 G/DL (ref 3.5–5.1)
ALP BLD-CCNC: 54 U/L (ref 38–126)
ALT SERPL-CCNC: 28 U/L (ref 11–66)
AST SERPL-CCNC: 31 U/L (ref 5–40)
BASINOPHIL, AUTOMATED: 1 % (ref 0–3)
BASOPHILS ABSOLUTE: 0.1 THOU/MM3 (ref 0–0.1)
BILIRUB SERPL-MCNC: 0.4 MG/DL (ref 0.3–1.2)
BILIRUBIN DIRECT: < 0.2 MG/DL (ref 0–0.3)
BUN BLDV-MCNC: 19 MG/DL (ref 7–22)
CHLORIDE, WHOLE BLOOD: 101 MEQ/L (ref 98–109)
CO2: 25 MEQ/L (ref 23–33)
CREATININE, WHOLE BLOOD: 0.9 MG/DL (ref 0.5–1.2)
EOSINOPHILS ABSOLUTE: 0.6 THOU/MM3 (ref 0–0.4)
EOSINOPHILS RELATIVE PERCENT: 13 % (ref 0–4)
GFR, ESTIMATED ,CON: > 90 ML/MIN/1.73M2
GLUCOSE, WHOLE BLOOD: 174 MG/DL (ref 70–108)
HCT VFR BLD CALC: 40.7 % (ref 42–52)
HEMOGLOBIN: 13.8 GM/DL (ref 14–18)
IMMATURE GRANULOCYTES: 1 %
IONIZED CALCIUM, WHOLE BLOOD: 1.16 MMOL/L (ref 1.12–1.32)
LYMPHOCYTES # BLD: 16 % (ref 15–47)
LYMPHOCYTES ABSOLUTE: 0.7 THOU/MM3 (ref 1–4.8)
MCH RBC QN AUTO: 29.8 PG (ref 26–33)
MCHC RBC AUTO-ENTMCNC: 33.9 GM/DL (ref 32.2–35.5)
MCV RBC AUTO: 88 FL (ref 80–94)
MONOCYTES ABSOLUTE: 0.5 THOU/MM3 (ref 0.4–1.3)
MONOCYTES: 10 % (ref 0–12)
PDW BLD-RTO: 13.3 % (ref 11.5–14.5)
PLATELET # BLD: 181 THOU/MM3 (ref 130–400)
PMV BLD AUTO: 9.9 FL (ref 9.4–12.4)
POTASSIUM, WHOLE BLOOD: 4.1 MEQ/L (ref 3.5–4.9)
RBC # BLD: 4.63 MILL/MM3 (ref 4.7–6.1)
SEG NEUTROPHILS: 59 % (ref 43–75)
SEGMENTED NEUTROPHILS ABSOLUTE COUNT: 2.6 THOU/MM3 (ref 1.8–7.7)
SODIUM, WHOLE BLOOD: 138 MEQ/L (ref 138–146)
TOTAL PROTEIN: 6.7 G/DL (ref 6.1–8)
WBC # BLD: 4.4 THOU/MM3 (ref 4.8–10.8)

## 2020-06-09 PROCEDURE — 36593 DECLOT VASCULAR DEVICE: CPT

## 2020-06-09 PROCEDURE — 80076 HEPATIC FUNCTION PANEL: CPT

## 2020-06-09 PROCEDURE — 1036F TOBACCO NON-USER: CPT | Performed by: INTERNAL MEDICINE

## 2020-06-09 PROCEDURE — 82374 ASSAY BLOOD CARBON DIOXIDE: CPT

## 2020-06-09 PROCEDURE — 6360000002 HC RX W HCPCS: Performed by: INTERNAL MEDICINE

## 2020-06-09 PROCEDURE — G8427 DOCREV CUR MEDS BY ELIG CLIN: HCPCS | Performed by: INTERNAL MEDICINE

## 2020-06-09 PROCEDURE — G8417 CALC BMI ABV UP PARAM F/U: HCPCS | Performed by: INTERNAL MEDICINE

## 2020-06-09 PROCEDURE — 99211 OFF/OP EST MAY X REQ PHY/QHP: CPT

## 2020-06-09 PROCEDURE — 3017F COLORECTAL CA SCREEN DOC REV: CPT | Performed by: INTERNAL MEDICINE

## 2020-06-09 PROCEDURE — 36591 DRAW BLOOD OFF VENOUS DEVICE: CPT

## 2020-06-09 PROCEDURE — 80047 BASIC METABLC PNL IONIZED CA: CPT

## 2020-06-09 PROCEDURE — 85025 COMPLETE CBC W/AUTO DIFF WBC: CPT

## 2020-06-09 PROCEDURE — 2580000003 HC RX 258: Performed by: INTERNAL MEDICINE

## 2020-06-09 PROCEDURE — 84520 ASSAY OF UREA NITROGEN: CPT

## 2020-06-09 PROCEDURE — 99214 OFFICE O/P EST MOD 30 MIN: CPT | Performed by: INTERNAL MEDICINE

## 2020-06-09 RX ORDER — HEPARIN SODIUM (PORCINE) LOCK FLUSH IV SOLN 100 UNIT/ML 100 UNIT/ML
500 SOLUTION INTRAVENOUS PRN
Status: CANCELLED | OUTPATIENT
Start: 2020-06-09

## 2020-06-09 RX ORDER — HEPARIN SODIUM (PORCINE) LOCK FLUSH IV SOLN 100 UNIT/ML 100 UNIT/ML
500 SOLUTION INTRAVENOUS PRN
Status: DISCONTINUED | OUTPATIENT
Start: 2020-06-09 | End: 2020-06-10 | Stop reason: HOSPADM

## 2020-06-09 RX ORDER — SODIUM CHLORIDE 0.9 % (FLUSH) 0.9 %
20 SYRINGE (ML) INJECTION PRN
Status: CANCELLED | OUTPATIENT
Start: 2020-06-09

## 2020-06-09 RX ORDER — SODIUM CHLORIDE 0.9 % (FLUSH) 0.9 %
10 SYRINGE (ML) INJECTION PRN
Status: CANCELLED | OUTPATIENT
Start: 2020-06-09

## 2020-06-09 RX ORDER — SODIUM CHLORIDE 0.9 % (FLUSH) 0.9 %
20 SYRINGE (ML) INJECTION PRN
Status: DISCONTINUED | OUTPATIENT
Start: 2020-06-09 | End: 2020-06-10 | Stop reason: HOSPADM

## 2020-06-09 RX ADMIN — Medication 500 UNITS: at 11:25

## 2020-06-09 RX ADMIN — Medication 10 ML: at 09:45

## 2020-06-09 RX ADMIN — Medication 20 ML: at 11:25

## 2020-06-09 RX ADMIN — ALTEPLASE 2 MG: 2.2 INJECTION, POWDER, LYOPHILIZED, FOR SOLUTION INTRAVENOUS at 09:59

## 2020-06-09 RX ADMIN — WATER 2.2 ML: 1 INJECTION INTRAMUSCULAR; INTRAVENOUS; SUBCUTANEOUS at 10:00

## 2020-06-09 NOTE — PROGRESS NOTES
Pt discharged in stable condition with verbalization of discharge instructions all questions answered and all  belongings sent with patient. Patient tolerated mediport blood draw and flush  without any complications or signs of a reaction.

## 2020-06-09 NOTE — PLAN OF CARE
Problem: Musculor/Skeletal Functional Status  Goal: Absence of falls  Outcome: Met This Shift  Note: No falls this admission   Intervention: Fall precautions  Note: Patient aware of fall precautions for here and at home -call light in reach while here       Problem: Intellectual/Education/Knowledge Deficit  Goal: Teaching initiated upon admission  Outcome: Met This Shift  Note: Reviewed cathflow and mediport blood draw and flush with patient, patient offered no questions or concerns. Patient verbalized understanding of drug being administered. Goal: Written Disposition Instruction form completed  Outcome: Met This Shift  Note: Discharge instructions given and reviewed with patient. All questions answered. Patient verbalized understanding   Intervention: Verbal/written education provided  Note: Discharge instruction fawn     Problem: Discharge Planning  Goal: Knowledge of discharge instructions  Description: Knowledge of discharge instructions     Outcome: Met This Shift  Note: Patient able to teach back follow up appointments and when to call the doctor. Patient offers no questions at this time   Intervention: Interaction with patient/family and care team  Note: No concerns   Intervention: Discharge to appropriate level of care  Note: Discharge home     Problem: Infection - Central Venous Catheter-Associated Bloodstream Infection:  Goal: Will show no infection signs and symptoms  Description: Will show no infection signs and symptoms  Outcome: Met This Shift  Note: No signs of infection noted at [facility] site    Intervention: Central line needs assessment  Note:  Patient currently has  poor venous access  and doctor wants to leave in for 6 months  Intervention: Infection risk assessment  Note: Patient aware that is of increased risk for infection due to receiving chemotherapy and having a central venous catheter.  Patient aware of signs and symptoms of infection and when to call the doctor    Care plan reviewed with patient and he verbalized understanding of the plan of care and contributed to goal setting.

## 2020-06-09 NOTE — PROGRESS NOTES
Cardiovascular: Negative. Gastrointestinal: Chronic nausea. Genitourinary: Negative. Musculoskeletal: Negative. Skin: Negative. Neurological: Negative. Hematological: Negative. Psychiatric/Behavioral: Negative. Objective:   Physical Exam  Vitals:    06/09/20 0923   BP: 137/72   Pulse: 97   Resp: 18   Temp: 98.3 °F (36.8 °C)   SpO2: 93%   Vitals reviewed and are stable. Constitutional: Well-developed and well-nourished. No acute distress. HENT: Normocephalic and atraumatic. Eyes: Pupils are equal and reactive. No scleral icterus. Neck: Overall appearance is symmetrical. No identifiable masses. Chest: Inspection and palpation of chest is normal.  Pulmonary: Effort normal. No respiratory distress. Cardiovascular: RRR. No edema in any of the four extremities. Abdominal: Soft. No hepatomegaly or splenomegaly. Musculoskeletal: Gait is normal. Muscle strength and tone good. Neurological: Alert and oriented to person, place, and time. Judgment and thought content normal.  Skin: Skin is warm and dry. No rash. Psychiatric: Mood and affect appropriate for the clinical situation. Behavior is normal.      Data Analysis:    Hematology 6/9/2020 4/28/2020 3/31/2020   WBC 4.4 (L) 5.9 8.3   RBC 4.63 (L) 4.73 4.97   HGB 13.8 (L) 14.1 15.1   HCT 40.7 (L) 41.6 (L) 44.9   MCV 88 88 90   RDW 13.3 13.6 13.2    178 203     Assessment:   1. Follicular lymphoma stage III. 2.  Pulmonary nodule. 3.  Chronic nausea. 4.  Leukopenia  5. Anemia. Plan:   1. Monitor for recurrence of malignancy. 2.  CT scan of the chest prior to recurrent clinic to evaluate pulmonary nodule. 3.  Follow-up with primary care provider and gastroenterologist for chronic nausea. 4.  Monitor total WBC count and for signs of infection/fever. 5.  Monitor hemoglobin/hematocrit and for any signs of blood loss. Servando Nguyễn Director: [place]  [facility]  [address], 1 [facility], [address] [place] [address], [address] [place] [address], [address] of the [facility] at [facility]      **This report has been created using voice recognition software. It may contain minor errors which are inherent in voice recognition technology. **

## 2020-06-13 PROBLEM — D64.9 ANEMIA: Status: ACTIVE | Noted: 2020-06-13

## 2020-06-13 PROBLEM — R91.1 PULMONARY NODULE: Status: ACTIVE | Noted: 2020-06-13

## 2020-07-21 ENCOUNTER — HOSPITAL ENCOUNTER (OUTPATIENT)
Dept: INFUSION THERAPY | Age: 60
Discharge: HOME | End: 2020-07-21
Payer: COMMERCIAL

## 2020-07-21 ENCOUNTER — OFFICE VISIT (OUTPATIENT)
Dept: ONCOLOGY | Age: 60
End: 2020-07-21
Payer: COMMERCIAL

## 2020-07-21 VITALS
BODY MASS INDEX: 42.66 KG/M2 | DIASTOLIC BLOOD PRESSURE: 77 MMHG | WEIGHT: 315 LBS | HEIGHT: 72 IN | HEART RATE: 91 BPM | TEMPERATURE: 97.2 F | OXYGEN SATURATION: 93 % | SYSTOLIC BLOOD PRESSURE: 128 MMHG | RESPIRATION RATE: 20 BRPM

## 2020-07-21 VITALS
HEART RATE: 91 BPM | DIASTOLIC BLOOD PRESSURE: 77 MMHG | SYSTOLIC BLOOD PRESSURE: 128 MMHG | OXYGEN SATURATION: 93 % | RESPIRATION RATE: 20 BRPM | TEMPERATURE: 97.2 F

## 2020-07-21 DIAGNOSIS — C82.58 DIFFUSE FOLLICLE CENTER LYMPHOMA OF LYMPH NODES OF MULTIPLE REGIONS (HCC): ICD-10-CM

## 2020-07-21 DIAGNOSIS — Z51.11 ENCOUNTER FOR CHEMOTHERAPY MANAGEMENT: ICD-10-CM

## 2020-07-21 DIAGNOSIS — R91.1 NODULE OF UPPER LOBE OF RIGHT LUNG: Primary | ICD-10-CM

## 2020-07-21 LAB
ALBUMIN SERPL-MCNC: 4 G/DL (ref 3.5–5.1)
ALP BLD-CCNC: 52 U/L (ref 38–126)
ALT SERPL-CCNC: 35 U/L (ref 11–66)
AST SERPL-CCNC: 34 U/L (ref 5–40)
BASOPHILS # BLD: 1.7 %
BASOPHILS ABSOLUTE: 0.1 THOU/MM3 (ref 0–0.1)
BILIRUB SERPL-MCNC: 0.4 MG/DL (ref 0.3–1.2)
BILIRUBIN DIRECT: < 0.2 MG/DL (ref 0–0.3)
BUN BLDV-MCNC: 19 MG/DL (ref 7–22)
CHLORIDE, WHOLE BLOOD: 105 MEQ/L (ref 98–109)
CO2: 22 MEQ/L (ref 23–33)
CREATININE, WHOLE BLOOD: 1 MG/DL (ref 0.5–1.2)
EOSINOPHIL # BLD: 17.6 %
EOSINOPHILS ABSOLUTE: 0.6 THOU/MM3 (ref 0–0.4)
ERYTHROCYTE [DISTWIDTH] IN BLOOD BY AUTOMATED COUNT: 13 % (ref 11.5–14.5)
ERYTHROCYTE [DISTWIDTH] IN BLOOD BY AUTOMATED COUNT: 44 FL (ref 35–45)
GFR, ESTIMATED ,CON: 80 ML/MIN/1.73M2
GLUCOSE, WHOLE BLOOD: 177 MG/DL (ref 70–108)
HCT VFR BLD CALC: 43.9 % (ref 42–52)
HEMOGLOBIN: 14.1 GM/DL (ref 14–18)
IMMATURE GRANS (ABS): 0.08 THOU/MM3 (ref 0–0.07)
IMMATURE GRANULOCYTES: 2.3 %
IONIZED CALCIUM, WHOLE BLOOD: 1.15 MMOL/L (ref 1.12–1.32)
LD: 238 U/L (ref 100–190)
LYMPHOCYTES # BLD: 19.9 %
LYMPHOCYTES ABSOLUTE: 0.7 THOU/MM3 (ref 1–4.8)
MCH RBC QN AUTO: 29.6 PG (ref 26–33)
MCHC RBC AUTO-ENTMCNC: 32.1 GM/DL (ref 32.2–35.5)
MCV RBC AUTO: 92.2 FL (ref 80–94)
MONOCYTES # BLD: 16.5 %
MONOCYTES ABSOLUTE: 0.6 THOU/MM3 (ref 0.4–1.3)
NUCLEATED RED BLOOD CELLS: 0 /100 WBC
PLATELET # BLD: 214 THOU/MM3 (ref 130–400)
PMV BLD AUTO: 10.9 FL (ref 9.4–12.4)
POTASSIUM, WHOLE BLOOD: 3.9 MEQ/L (ref 3.5–4.9)
RBC # BLD: 4.76 MILL/MM3 (ref 4.7–6.1)
SEG NEUTROPHILS: 42 %
SEGMENTED NEUTROPHILS ABSOLUTE COUNT: 1.5 THOU/MM3 (ref 1.8–7.7)
SODIUM, WHOLE BLOOD: 142 MEQ/L (ref 138–146)
TOTAL PROTEIN: 6.4 G/DL (ref 6.1–8)
WBC # BLD: 3.5 THOU/MM3 (ref 4.8–10.8)

## 2020-07-21 PROCEDURE — G8417 CALC BMI ABV UP PARAM F/U: HCPCS | Performed by: INTERNAL MEDICINE

## 2020-07-21 PROCEDURE — 80076 HEPATIC FUNCTION PANEL: CPT

## 2020-07-21 PROCEDURE — 1036F TOBACCO NON-USER: CPT | Performed by: INTERNAL MEDICINE

## 2020-07-21 PROCEDURE — 36591 DRAW BLOOD OFF VENOUS DEVICE: CPT

## 2020-07-21 PROCEDURE — 99215 OFFICE O/P EST HI 40 MIN: CPT | Performed by: INTERNAL MEDICINE

## 2020-07-21 PROCEDURE — 84520 ASSAY OF UREA NITROGEN: CPT

## 2020-07-21 PROCEDURE — 85025 COMPLETE CBC W/AUTO DIFF WBC: CPT

## 2020-07-21 PROCEDURE — 2580000003 HC RX 258: Performed by: INTERNAL MEDICINE

## 2020-07-21 PROCEDURE — 99211 OFF/OP EST MAY X REQ PHY/QHP: CPT

## 2020-07-21 PROCEDURE — 3017F COLORECTAL CA SCREEN DOC REV: CPT | Performed by: INTERNAL MEDICINE

## 2020-07-21 PROCEDURE — 83615 LACTATE (LD) (LDH) ENZYME: CPT

## 2020-07-21 PROCEDURE — G8427 DOCREV CUR MEDS BY ELIG CLIN: HCPCS | Performed by: INTERNAL MEDICINE

## 2020-07-21 PROCEDURE — 82374 ASSAY BLOOD CARBON DIOXIDE: CPT

## 2020-07-21 PROCEDURE — 6360000002 HC RX W HCPCS: Performed by: INTERNAL MEDICINE

## 2020-07-21 PROCEDURE — 80047 BASIC METABLC PNL IONIZED CA: CPT

## 2020-07-21 RX ORDER — SODIUM CHLORIDE 0.9 % (FLUSH) 0.9 %
10 SYRINGE (ML) INJECTION PRN
Status: CANCELLED | OUTPATIENT
Start: 2020-07-21

## 2020-07-21 RX ORDER — SODIUM CHLORIDE 0.9 % (FLUSH) 0.9 %
10 SYRINGE (ML) INJECTION PRN
Status: DISCONTINUED | OUTPATIENT
Start: 2020-07-21 | End: 2020-07-22 | Stop reason: HOSPADM

## 2020-07-21 RX ORDER — SODIUM CHLORIDE 0.9 % (FLUSH) 0.9 %
20 SYRINGE (ML) INJECTION PRN
Status: CANCELLED | OUTPATIENT
Start: 2020-07-21

## 2020-07-21 RX ORDER — HEPARIN SODIUM (PORCINE) LOCK FLUSH IV SOLN 100 UNIT/ML 100 UNIT/ML
500 SOLUTION INTRAVENOUS PRN
Status: DISCONTINUED | OUTPATIENT
Start: 2020-07-21 | End: 2020-07-22 | Stop reason: HOSPADM

## 2020-07-21 RX ORDER — HEPARIN SODIUM (PORCINE) LOCK FLUSH IV SOLN 100 UNIT/ML 100 UNIT/ML
500 SOLUTION INTRAVENOUS PRN
Status: CANCELLED | OUTPATIENT
Start: 2020-07-21

## 2020-07-21 RX ORDER — SODIUM CHLORIDE 0.9 % (FLUSH) 0.9 %
20 SYRINGE (ML) INJECTION PRN
Status: DISCONTINUED | OUTPATIENT
Start: 2020-07-21 | End: 2020-07-22 | Stop reason: HOSPADM

## 2020-07-21 RX ADMIN — Medication 500 UNITS: at 09:56

## 2020-07-21 RX ADMIN — Medication 20 ML: at 09:56

## 2020-07-21 RX ADMIN — Medication 10 ML: at 09:55

## 2020-07-21 NOTE — PROGRESS NOTES
[facility] CANCER CENTER  CANCER NETWORK OF [facility]  [facility] [address] [facility]  [address] [place] [address]  Dept: [phone]  Dept Fax: [phone]: [phone]     Subjective:      Chief Complaint:  Nabil Garner is a 56 y.o. with lymphoma. HPI:  Nabil is here today for follow-up regarding his history of non-Hodgkin's lymphoma. On July 14, 2020 the patient had a follow-up CT scan of the chest completed. This confirmed the presence of a right upper lobe nodule that had increased in size. I have recommended the patient that we proceed with a CT-guided biopsy of this mass to evaluate for malignancy. This could represent a residual or recurrence of his lymphoma but also may represent a secondary malignancy. We did discuss there is also the possibility of benign etiology. However given the growth of the mass on the CT scan, we would recommend a biopsy for tissue confirmation. The patient does not have pulmonary symptoms. He denies fever, cough, shortness of breath or other signs of infection. Both his bowel and bladder habits have been normal.  There are no other findings that be suspicious or suggestive of recurrence of his lymphoma. He denies hot flashes, night sweats, unintentional weight loss, or unexplained fever. His ECOG performance status is level 0. PMH, SH, and FH:  I reviewed the patients medication list and allergy list as noted on the electronic medical record. The PMH, SH and FH were also reviewed as noted on the EMR. Review of Systems  Constitutional: Negative. HENT: Negative. Eyes: Negative. Respiratory: Negative. Cardiovascular: Negative. Gastrointestinal: Nausea. Genitourinary: Negative. Musculoskeletal: Negative. Skin: Negative. Neurological: Negative. Hematological: Negative. Psychiatric/Behavioral: Negative.           Objective:   Physical Exam  Vitals: 07/21/20 0952   BP: 128/77   Pulse: 91   Resp: 20   Temp: 97.2 °F (36.2 °C)   SpO2: 93%   Vitals reviewed and are stable. Constitutional: Well-developed and well-nourished. No acute distress. HENT: Normocephalic and atraumatic. Eyes: Pupils are equal and reactive. No scleral icterus. Neck: Overall appearance is symmetrical. No identifiable masses. Chest: Inspection and palpation of chest is normal.  Pulmonary: Effort normal. No respiratory distress. Cardiovascular: RRR. No edema in any of the four extremities. Abdominal: Soft. No hepatomegaly or splenomegaly. Musculoskeletal: Gait is normal. Muscle strength and tone good. Neurological: Alert and oriented to person, place, and time. Judgment and thought content normal.  Skin: Skin is warm and dry. No rash. Psychiatric: Mood and affect appropriate for the clinical situation. Behavior is normal.      Data Analysis:    Hematology 7/21/2020 6/9/2020 4/28/2020   WBC 3.5 (L) 4.4 (L) 5.9   RBC 4.76 4.63 (L) 4.73   HGB 14.1 13.8 (L) 14.1   HCT 43.9 40.7 (L) 41.6 (L)   MCV 92.2 88 88   RDW  13.3 13.6    181 178     Assessment:   1. Follicular lymphoma stage III. 2.  Pulmonary nodule. 3.  Leukopenia     Plan:   1.  Schedule with interventional radiology at [facility] a CT-guided biopsy of right upper lobe lung nodule. 2.  Return to clinic to see me after the biopsy has been completed. 3.  Monitor for recurrence of malignancy. 4.  Monitor total WBC count and for signs of infection/fever. Kobe Cisneros M.D.                                                                          Medical Director: [facility]  [facility]  [address], 1 [facility], [address] [place] [address], [address] [place] [address], [address] [place] [address] of the [facility] at the [facility]      **This report has been created using voice recognition software. It may contain minor errors which are inherent in voice recognition technology. **

## 2020-07-21 NOTE — PATIENT INSTRUCTIONS
1.  Please schedule with interventional radiology at [facility] a CT-guided biopsy of right upper lobe lung nodule. 2.  Return to clinic to see me after the biopsy has been completed.

## 2020-07-21 NOTE — PLAN OF CARE
Problem: Musculor/Skeletal Functional Status  Goal: Absence of falls  Outcome: Met This Shift  Note: Patient free of falls this visit. Intervention: Fall precautions  Note: Fall risks assessed. Precautions discussed. Call light within reach during visit. Problem: Discharge Planning  Goal: Knowledge of discharge instructions  Description: Knowledge of discharge instructions     Outcome: Met This Shift  Note: Patient verbalizes understanding of discharge instructions, follow up appointment and when to call physician if needed   Intervention: Interaction with patient/family and care team  Note: Discharge instructions given to pt and reviewed. Follow up appointments discussed. Problem: Infection - Central Venous Catheter-Associated Bloodstream Infection:  Goal: Will show no infection signs and symptoms  Description: Will show no infection signs and symptoms  Outcome: Met This Shift  Note: Mediport site with no redness or warmth. Skin over port site intact with no signs of breakdown noted. Patient verbalizes signs/symptoms of port infection and when to notify the physician. Intervention: Infection risk assessment  Note: Discuss port maintenance, infection prevention, signs of infection and when to call the doctor. Care plan reviewed with patient. Patient verbalizes understanding of the plan of care and contributes to goal setting.

## 2020-07-29 ENCOUNTER — HOSPITAL ENCOUNTER (OUTPATIENT)
Dept: CT IMAGING | Age: 60
Discharge: HOME | End: 2020-07-29

## 2020-08-03 RX ORDER — FENTANYL CITRATE 50 UG/ML
50 INJECTION, SOLUTION INTRAMUSCULAR; INTRAVENOUS ONCE
Status: CANCELLED | OUTPATIENT
Start: 2020-08-03 | End: 2020-08-03

## 2020-08-03 RX ORDER — SODIUM CHLORIDE 450 MG/100ML
INJECTION, SOLUTION INTRAVENOUS CONTINUOUS
Status: CANCELLED | OUTPATIENT
Start: 2020-08-03

## 2020-08-03 RX ORDER — MIDAZOLAM HYDROCHLORIDE 1 MG/ML
1 INJECTION INTRAMUSCULAR; INTRAVENOUS ONCE
Status: CANCELLED | OUTPATIENT
Start: 2020-08-03 | End: 2020-08-03

## 2020-08-04 ENCOUNTER — HOSPITAL ENCOUNTER (OUTPATIENT)
Dept: GENERAL RADIOLOGY | Age: 60
Discharge: HOME | End: 2020-08-04
Payer: COMMERCIAL

## 2020-08-04 ENCOUNTER — HOSPITAL ENCOUNTER (OUTPATIENT)
Dept: CT IMAGING | Age: 60
Discharge: HOME | End: 2020-08-04
Payer: COMMERCIAL

## 2020-08-04 VITALS
BODY MASS INDEX: 46.68 KG/M2 | DIASTOLIC BLOOD PRESSURE: 86 MMHG | OXYGEN SATURATION: 94 % | TEMPERATURE: 97.1 F | RESPIRATION RATE: 16 BRPM | HEART RATE: 92 BPM | SYSTOLIC BLOOD PRESSURE: 136 MMHG | WEIGHT: 315 LBS

## 2020-08-04 LAB
CREAT SERPL-MCNC: 1 MG/DL (ref 0.4–1.2)
ERYTHROCYTE [DISTWIDTH] IN BLOOD BY AUTOMATED COUNT: 13.2 % (ref 11.5–14.5)
ERYTHROCYTE [DISTWIDTH] IN BLOOD BY AUTOMATED COUNT: 43 FL (ref 35–45)
GFR SERPL CREATININE-BSD FRML MDRD: 76 ML/MIN/1.73M2
HCT VFR BLD CALC: 43.9 % (ref 42–52)
HEMOGLOBIN: 14.6 GM/DL (ref 14–18)
MCH RBC QN AUTO: 30 PG (ref 26–33)
MCHC RBC AUTO-ENTMCNC: 33.3 GM/DL (ref 32.2–35.5)
MCV RBC AUTO: 90.3 FL (ref 80–94)
PLATELET # BLD: 197 THOU/MM3 (ref 130–400)
PMV BLD AUTO: 10 FL (ref 9.4–12.4)
RBC # BLD: 4.86 MILL/MM3 (ref 4.7–6.1)
WBC # BLD: 5 THOU/MM3 (ref 4.8–10.8)

## 2020-08-04 PROCEDURE — 71045 X-RAY EXAM CHEST 1 VIEW: CPT

## 2020-08-04 PROCEDURE — 36591 DRAW BLOOD OFF VENOUS DEVICE: CPT

## 2020-08-04 PROCEDURE — 88305 TISSUE EXAM BY PATHOLOGIST: CPT

## 2020-08-04 PROCEDURE — 85027 COMPLETE CBC AUTOMATED: CPT

## 2020-08-04 PROCEDURE — 77012 CT SCAN FOR NEEDLE BIOPSY: CPT

## 2020-08-04 PROCEDURE — 88333 PATH CONSLTJ SURG CYTO XM 1: CPT

## 2020-08-04 PROCEDURE — 6370000000 HC RX 637 (ALT 250 FOR IP)

## 2020-08-04 PROCEDURE — 32405 CT NEEDLE BIOPSY LUNG PERCUTANEOUS: CPT

## 2020-08-04 PROCEDURE — 2580000003 HC RX 258: Performed by: RADIOLOGY

## 2020-08-04 PROCEDURE — 82565 ASSAY OF CREATININE: CPT

## 2020-08-04 PROCEDURE — 6360000002 HC RX W HCPCS: Performed by: RADIOLOGY

## 2020-08-04 PROCEDURE — 6360000002 HC RX W HCPCS

## 2020-08-04 RX ORDER — IBUPROFEN 200 MG
TABLET ORAL ONCE
Status: COMPLETED | OUTPATIENT
Start: 2020-08-04 | End: 2020-08-04

## 2020-08-04 RX ORDER — SODIUM CHLORIDE 450 MG/100ML
INJECTION, SOLUTION INTRAVENOUS CONTINUOUS
Status: DISCONTINUED | OUTPATIENT
Start: 2020-08-04 | End: 2020-08-05 | Stop reason: HOSPADM

## 2020-08-04 RX ORDER — FENTANYL CITRATE 50 UG/ML
50 INJECTION, SOLUTION INTRAMUSCULAR; INTRAVENOUS ONCE
Status: COMPLETED | OUTPATIENT
Start: 2020-08-04 | End: 2020-08-04

## 2020-08-04 RX ORDER — HEPARIN SODIUM (PORCINE) LOCK FLUSH IV SOLN 100 UNIT/ML 100 UNIT/ML
500 SOLUTION INTRAVENOUS ONCE
Status: COMPLETED | OUTPATIENT
Start: 2020-08-04 | End: 2020-08-04

## 2020-08-04 RX ORDER — MIDAZOLAM HYDROCHLORIDE 1 MG/ML
1 INJECTION INTRAMUSCULAR; INTRAVENOUS ONCE
Status: COMPLETED | OUTPATIENT
Start: 2020-08-04 | End: 2020-08-04

## 2020-08-04 RX ORDER — SODIUM CHLORIDE 0.9 % (FLUSH) 0.9 %
10 SYRINGE (ML) INJECTION PRN
Status: DISCONTINUED | OUTPATIENT
Start: 2020-08-04 | End: 2020-08-05 | Stop reason: HOSPADM

## 2020-08-04 RX ADMIN — SODIUM CHLORIDE: 4.5 INJECTION, SOLUTION INTRAVENOUS at 10:29

## 2020-08-04 RX ADMIN — Medication 10 ML: at 14:03

## 2020-08-04 RX ADMIN — Medication 10 ML: at 10:14

## 2020-08-04 RX ADMIN — MIDAZOLAM HYDROCHLORIDE 1 MG: 1 INJECTION INTRAMUSCULAR; INTRAVENOUS at 11:12

## 2020-08-04 RX ADMIN — FENTANYL CITRATE 50 MCG: 50 INJECTION, SOLUTION INTRAMUSCULAR; INTRAVENOUS at 11:35

## 2020-08-04 RX ADMIN — HEPARIN 500 UNITS: 100 SYRINGE at 16:25

## 2020-08-04 RX ADMIN — FENTANYL CITRATE 50 MCG: 50 INJECTION, SOLUTION INTRAMUSCULAR; INTRAVENOUS at 11:13

## 2020-08-04 RX ADMIN — Medication: at 11:52

## 2020-08-04 ASSESSMENT — PAIN SCALES - GENERAL
PAINLEVEL_OUTOF10: 0

## 2020-08-04 ASSESSMENT — PAIN - FUNCTIONAL ASSESSMENT: PAIN_FUNCTIONAL_ASSESSMENT: 0-10

## 2020-08-04 NOTE — H&P
Formulation and discussion of sedation / procedure plans, risks, benefits, side effects and alternatives with patient and/or responsible adult completed.     Electronically signed by Dale Allen MD on 8/4/2020 at 11:00 AM
Gaetano Valdez MD, 10 mL at 08/04/20 1014    heparin flush 100 UNIT/ML injection 500 Units, 500 Units, Intercatheter, Once, Sweta Davidson MD  Prior to Admission medications    Medication Sig Start Date End Date Taking? Authorizing Provider   promethazine (PHENERGAN) 25 MG tablet TAKE ONE TABLET BY MOUTH EVERY 6 HOURS AS NEEDED FOR NAUSEA 6/1/20   Daniela Novoa MD   prochlorperazine (COMPAZINE) 10 MG tablet Take 1 tablet by mouth every 6 hours as needed (nausea) 3/31/20   Daniela Novoa MD   glipiZIDE (GLUCOTROL XL) 5 MG extended release tablet Take 5 mg by mouth 12/20/19   Historical Provider, MD   ondansetron (ZOFRAN) 4 MG tablet Take 1 tablet by mouth every 8 hours as needed for Nausea or Vomiting 12/10/19   Daniela Novoa MD   metFORMIN (GLUCOPHAGE) 1000 MG tablet  10/20/19   Historical Provider, MD   aspirin 81 MG tablet Take 81 mg by mouth daily    Historical Provider, MD     Additional information:       VITAL SIGNS   Vitals:    08/04/20 1150   BP: 116/75   Pulse: 86   Resp: 14   Temp:    SpO2: 95%       PHYSICAL:   Heart:  [x]Regular rate and rhythm  []Other:    Lungs:  [x]Clear    []Other:    Abdomen: [x]Soft    []Other:    Mental Status: [x]Alert & Oriented  []Other:      PLANNED PROCEDURE   [x]Biospy []Arteriogram              []Drainage   []Mediport Insertion  []Fistulogram []IV access       []Vertebroplasty / Augmentation  []IVC filter []Dialysis catheter []Biliary drainage  []Other: []CAPD Catheter []Nephrostomy Tube / Stent  SEDATION  Planned agent:[x]Midazolam []Meperidine [x]Sublimaze []Dilaudid []Morphine     []Diazepam  []Other:     ASA Classification:  []1 [x]2 []3 []4 []5  Class 1: A normal healthy patient  Class 2: Pt with mild to moderate systemic disease  Class 3: Severe systemic disease or disturbance  Class 4: Severe systemic disorders that are already life threatening. Class 5: Moribund pt with little chances of survival, for more than 24 hours.   Mallampati I Airway Classification:

## 2020-08-04 NOTE — PROGRESS NOTES
3209 Patient arrived to \[facility]\"" ambulatory for lung biopsy. Oriented to room and call light  PT RIGHTS AND RESPONSIBILITIES OFFERED TO PT.  1015 Procedure explained to patient verbalized understanding. Patient has been NPO  1024 Lab collected and sent as ordered. 1040 Patient taken to Ct for biopsy  1230 Band aid dry and intact no bleeding noted  1318 Patient resting quietly at this time. Band aid dry and intact  1400 Band aid dry and intact. Patient denies any needs  1423 Patient received from xray post chest film. Band aid dry and intact  1625 Patient received from xray stated okay to discharge. 1630 Discharge instructions given to patient and wife verbalized understanding.  Patient discharged to home in stable condition    _m___ Safety:       (Environmental)  Reita Lady to environment   Ensure ID band is correct and in place/ allergy band as needed   Assess for fall risk   Initiate fall precautions as applicable (fall band, side rails, etc.)   Call light within reach   Bed in low position/ wheels locked    _m___ Pain:        Assess pain level and characteristics   Administer analgesics as ordered   Assess effectiveness of pain management and report to MD as needed  m  ____ Knowledge Deficit:   Assess baseline knowledge   Provide teaching at level of understanding   Provide teaching via preferred learning method   Evaluate teaching effectiveness  m  ____ Hemodynamic/Respiratory Status:       (Pre and Post Procedure Monitoring)   Assess/Monitor vital signs and LOC   Assess Baseline SpO2 prior to any sedation   Obtain weight/height   Assess vital signs/ LOC until patient meets discharge criteria   Monitor procedure site and notify MD of any issues  m  ____ Infection-Risk of Central Venous Catheter:   Monitor for infection signs and symptoms (catheter site redness, temperature elevation, etc)   Assess for infection risks   Educate regarding infection prevention   Manage central venous catheter (flushes/ dressing changes per protocol)

## 2020-08-04 NOTE — PROGRESS NOTES
80 Pt in specials radiology for RUL lung biopsy. Explained procedure to pt and pt verbalizes understanding. 1054 Dr Neto Putnam to speak to pt. Consent signed. 1100 Pt positioned on table and attached to monitor. Pre biopsy scans taken. 1121 Right upper chest prepped and draped. [address] present. 305 [facility] First core biopsy sample obtained. Pt tolerating well. 1138 Second core biopsy sample obtained. 18 Third core biopsy sample obtained. 1140 Fourth core biosy sample obtained. Pt. tolerating well. 0 Fifth core biopsy sample obtained. 12 Sixth core biopsy sample obtained. 46 Seventh core biopsy sample obtained. 1 Eighth core biopsy sample obtained. Pt. tolerating well. 1148 Procedure complete. 1150 Post scan complete. Called report to [facility] in [facility].  1152 Triple antibiotic applied to the biopsy site and bandaid. Pt. stated he has no pain or SOB. 1159 Pt. transported via cart to [facility].

## 2020-08-05 NOTE — PROGRESS NOTES
Danisha Armando and spoke to pt for follow-up CT guided lung biopsy. States \"Doing good. \" Denies any trouble breathing, shortness of breath or coughing up blood. Denies any problems at biopsy site. Offers no complaints.

## 2020-08-10 ENCOUNTER — OFFICE VISIT (OUTPATIENT)
Dept: ONCOLOGY | Age: 60
End: 2020-08-10

## 2020-08-10 PROCEDURE — 99999 PR OFFICE/OUTPT VISIT,PROCEDURE ONLY: CPT | Performed by: INTERNAL MEDICINE

## 2020-08-10 NOTE — PROGRESS NOTES
[facility] CANCER CENTER  CANCER NETWORK OF [facility]  [facility] [address] [facility]  [address]  [address]  Dept: [phone]  Dept Fax: [phone]  Loc: [phone]     08/10/2020     Nabil Clare Amol     This patient did not come for the scheduled clinic appointment today. Colonel Sabine REDDING Medical Director: [facility]  [facility] Network [facility]  [address] [facility], [address] [place] [address], [address] [place] [address], [address] [place] [address] of the [facility] at [facility]      **This report has been created using voice recognition software. It may contain minor errors which are inherent in voice recognition technology. **

## 2020-08-11 ENCOUNTER — OFFICE VISIT (OUTPATIENT)
Dept: ONCOLOGY | Age: 60
End: 2020-08-11
Payer: COMMERCIAL

## 2020-08-11 ENCOUNTER — HOSPITAL ENCOUNTER (OUTPATIENT)
Dept: INFUSION THERAPY | Age: 60
Discharge: HOME | End: 2020-08-11
Payer: COMMERCIAL

## 2020-08-11 VITALS
SYSTOLIC BLOOD PRESSURE: 130 MMHG | TEMPERATURE: 97.2 F | DIASTOLIC BLOOD PRESSURE: 79 MMHG | HEART RATE: 112 BPM | HEIGHT: 72 IN | BODY MASS INDEX: 42.66 KG/M2 | OXYGEN SATURATION: 93 % | WEIGHT: 315 LBS | RESPIRATION RATE: 18 BRPM

## 2020-08-11 VITALS
TEMPERATURE: 97.2 F | BODY MASS INDEX: 46.68 KG/M2 | DIASTOLIC BLOOD PRESSURE: 79 MMHG | HEIGHT: 72 IN | SYSTOLIC BLOOD PRESSURE: 130 MMHG | HEART RATE: 112 BPM | OXYGEN SATURATION: 93 % | RESPIRATION RATE: 18 BRPM

## 2020-08-11 DIAGNOSIS — Z51.11 ENCOUNTER FOR CHEMOTHERAPY MANAGEMENT: ICD-10-CM

## 2020-08-11 DIAGNOSIS — R91.1 NODULE OF UPPER LOBE OF RIGHT LUNG: Primary | ICD-10-CM

## 2020-08-11 DIAGNOSIS — C82.58 DIFFUSE FOLLICLE CENTER LYMPHOMA OF LYMPH NODES OF MULTIPLE REGIONS (HCC): ICD-10-CM

## 2020-08-11 LAB
ABSOLUTE IMMATURE GRANULOCYTE: 0.24 THOU/MM3 (ref 0–0.07)
BASINOPHIL, AUTOMATED: 1 % (ref 0–3)
BASOPHILS ABSOLUTE: 0 THOU/MM3 (ref 0–0.1)
EOSINOPHILS ABSOLUTE: 0.4 THOU/MM3 (ref 0–0.4)
EOSINOPHILS RELATIVE PERCENT: 8 % (ref 0–4)
HCT VFR BLD CALC: 43.8 % (ref 42–52)
HEMOGLOBIN: 14.6 GM/DL (ref 14–18)
IMMATURE GRANULOCYTES: 5 %
LYMPHOCYTES # BLD: 17 % (ref 15–47)
LYMPHOCYTES ABSOLUTE: 0.8 THOU/MM3 (ref 1–4.8)
MCH RBC QN AUTO: 29.2 PG (ref 26–33)
MCHC RBC AUTO-ENTMCNC: 33.3 GM/DL (ref 32.2–35.5)
MCV RBC AUTO: 88 FL (ref 80–94)
MONOCYTES ABSOLUTE: 0.4 THOU/MM3 (ref 0.4–1.3)
MONOCYTES: 8 % (ref 0–12)
PDW BLD-RTO: 13.1 % (ref 11.5–14.5)
PLATELET # BLD: 218 THOU/MM3 (ref 130–400)
PMV BLD AUTO: 9.8 FL (ref 9.4–12.4)
RBC # BLD: 5 MILL/MM3 (ref 4.7–6.1)
SEG NEUTROPHILS: 61 % (ref 43–75)
SEGMENTED NEUTROPHILS ABSOLUTE COUNT: 3.1 THOU/MM3 (ref 1.8–7.7)
WBC # BLD: 5 THOU/MM3 (ref 4.8–10.8)

## 2020-08-11 PROCEDURE — 85025 COMPLETE CBC W/AUTO DIFF WBC: CPT

## 2020-08-11 PROCEDURE — G8427 DOCREV CUR MEDS BY ELIG CLIN: HCPCS | Performed by: INTERNAL MEDICINE

## 2020-08-11 PROCEDURE — 6360000002 HC RX W HCPCS: Performed by: INTERNAL MEDICINE

## 2020-08-11 PROCEDURE — G8417 CALC BMI ABV UP PARAM F/U: HCPCS | Performed by: INTERNAL MEDICINE

## 2020-08-11 PROCEDURE — 3017F COLORECTAL CA SCREEN DOC REV: CPT | Performed by: INTERNAL MEDICINE

## 2020-08-11 PROCEDURE — 99211 OFF/OP EST MAY X REQ PHY/QHP: CPT

## 2020-08-11 PROCEDURE — 2580000003 HC RX 258: Performed by: INTERNAL MEDICINE

## 2020-08-11 PROCEDURE — 36591 DRAW BLOOD OFF VENOUS DEVICE: CPT

## 2020-08-11 PROCEDURE — 99215 OFFICE O/P EST HI 40 MIN: CPT | Performed by: INTERNAL MEDICINE

## 2020-08-11 PROCEDURE — 1036F TOBACCO NON-USER: CPT | Performed by: INTERNAL MEDICINE

## 2020-08-11 RX ORDER — SODIUM CHLORIDE 0.9 % (FLUSH) 0.9 %
20 SYRINGE (ML) INJECTION PRN
Status: DISCONTINUED | OUTPATIENT
Start: 2020-08-11 | End: 2020-08-12 | Stop reason: HOSPADM

## 2020-08-11 RX ORDER — HEPARIN SODIUM (PORCINE) LOCK FLUSH IV SOLN 100 UNIT/ML 100 UNIT/ML
500 SOLUTION INTRAVENOUS PRN
Status: DISCONTINUED | OUTPATIENT
Start: 2020-08-11 | End: 2020-08-12 | Stop reason: HOSPADM

## 2020-08-11 RX ORDER — SODIUM CHLORIDE 0.9 % (FLUSH) 0.9 %
20 SYRINGE (ML) INJECTION PRN
Status: CANCELLED | OUTPATIENT
Start: 2020-08-11

## 2020-08-11 RX ORDER — SODIUM CHLORIDE 0.9 % (FLUSH) 0.9 %
10 SYRINGE (ML) INJECTION PRN
Status: CANCELLED | OUTPATIENT
Start: 2020-08-11

## 2020-08-11 RX ORDER — HEPARIN SODIUM (PORCINE) LOCK FLUSH IV SOLN 100 UNIT/ML 100 UNIT/ML
500 SOLUTION INTRAVENOUS PRN
Status: CANCELLED | OUTPATIENT
Start: 2020-08-11

## 2020-08-11 RX ADMIN — Medication 500 UNITS: at 15:25

## 2020-08-11 RX ADMIN — Medication 10 ML: at 15:25

## 2020-08-11 NOTE — PLAN OF CARE
Care plan reviewed with patient. Patient verbalized understanding of the plan of care and contribute to goal setting. Problem: Intellectual/Education/Knowledge Deficit  Goal: Teaching initiated upon admission  Description: Patient verbalizes understanding to verbal information given on lab draw  Outcome: Met This Shift  Note: Patient verbalizes understanding to verbal information given on lab drawn  Intervention: Verbal/written education provided  Description: Discuss lab draw  Note: Discuss lab draw     Problem: Discharge Planning  Goal: Knowledge of discharge instructions  Description: Knowledge of discharge instructions  Outcome: Met This Shift  Note: Verbalized understanding of discharge instructions, follow ups and when to call doctor   Intervention: Discharge to appropriate level of care  Note: Discuss discharge instructions, follow ups and when to call doctor. Problem: Falls - Risk of:  Goal: Will remain free from falls  Description: Will remain free from falls  Outcome: Met This Shift  Note: Free from falls while in infusion center. Verbalized understanding of fall prevention and to ask for assistance with ambulation   Intervention: Assess risk factors for falls  Description: Assess risk factors for falls  Note: Assess for fall risk, instruct to ask for assistance with ambulation      Problem: Infection - Central Venous Catheter-Associated Bloodstream Infection:  Goal: Will show no infection signs and symptoms  Description: Will show no infection signs and symptoms  Outcome: Met This Shift  Note: Mediport site with no redness or warmth. Skin over port intact with no signs of breakdown noted. Patient verbalizes signs/symptoms of port infection and when to notify the physician.     Intervention: Infection risk assessment  Description: Infection risk assessment  Note: Discuss port maintenance, infection prevention, signs and when to call

## 2020-08-11 NOTE — PROGRESS NOTES
[facility] CANCER CENTER  CANCER NETWORK OF [facility]  [facility] [address] [facility]  [address] [place] [address]  Dept: [phone]  Dept Fax: [phone]: [phone]     Subjective:      Chief Complaint:  Nabil Kirby is a 56 y.o. with lymphoma. HPI:  Nabil is here today for follow-up regarding his history of non-Hodgkin's lymphoma. He also has had a recent history of a pulmonary mass. This was recently biopsied by CT-guided biopsy. Surgical pathology confirmed an adenocarcinoma most consistent with a lung primary. On a PET/CT scan in May there was no evidence of other sites of malignancy. There was no mediastinal or hilar lymphadenopathy identified. I recommended the patient a cardiothoracic surgery consultation as well as a repeat PET scan for repeat staging. The patient does not have any specific symptoms related to his pulmonary mass and adenocarcinoma. He denies fever, shortness of breath chest pain, or other signs of infection. There is no evidence of recurrence of his lymphoma. Patient's bowel and bladder habits have been normal.  His ECOG performance status is level 0. PMH, SH, and FH:  I reviewed the patients medication list and allergy list as noted on the electronic medical record. The PMH, SH and FH were also reviewed as noted on the EMR. Review of Systems  Constitutional: Negative. HENT: Negative. Eyes: Negative. Respiratory: Negative. Cardiovascular: Negative. Gastrointestinal: Negative. Genitourinary: Negative. Musculoskeletal: Negative. Skin: Negative. Neurological: Negative. Hematological: Negative. Psychiatric/Behavioral: Negative. Objective:   Physical Exam  Vitals:    08/11/20 1529   BP: 130/79   Pulse: 112   Resp: 18   Temp: 97.2 °F (36.2 °C)   SpO2: 93%   Vitals reviewed and are stable. Constitutional: Well-developed and well-nourished. No acute distress. HENT: Normocephalic and atraumatic. Eyes: Pupils are equal and reactive. No scleral icterus. Neck: Overall appearance is symmetrical. No identifiable masses. Chest: Inspection and palpation of chest is normal.  Pulmonary: Effort normal. No respiratory distress. Cardiovascular: RRR. No edema in any of the four extremities. Abdominal: Soft. No hepatomegaly or splenomegaly. Musculoskeletal: Gait is normal. Muscle strength and tone good. Neurological: Alert and oriented to person, place, and time. Judgment and thought content normal.  Skin: Skin is warm and dry. No rash. Psychiatric: Mood and affect appropriate for the clinical situation. Behavior is normal.      Data Analysis:    Hematology 8/11/2020 8/4/2020 7/21/2020   WBC 5.0 5.0 3.5 (L)   RBC 5.00 4.86 4.76   HGB 14.6 14.6 14.1   HCT 43.8 43.9 43.9   MCV 88 90.3 92.2   RDW 13.1      197 214     Assessment:   1. Follicular lymphoma stage III. 2.  Adenocarcinoma of the lung. Plan:   1. Consult Dr. Denver Hernandez for newly diagnosed adenocarcinoma of the lung. Evaluate for surgical treatment options. 2.  Schedule PET scan for staging of lung cancer  3. Monitor for recurrence of lymphoma. Akila Delgado M.D. Medical Director: [facility]  [facility]  [address] [facility], [address] [place] [address], [address] [place] [address], [address] [place] [address] of [facility] at [facility]      **This report has been created using voice recognition software. It may contain minor errors which are inherent in voice recognition technology. **

## 2020-08-11 NOTE — PROGRESS NOTES
Pt discharged in stable condition with verbalization of discharge instructions all questions answered and all  belongings sent with patient. Patient tolerated mediport blood draw and flush without any complications or signs of a reaction.  Patient escorted to exam room to see doctor

## 2020-08-15 PROBLEM — C34.91 ADENOCARCINOMA, LUNG, RIGHT (HCC): Status: ACTIVE | Noted: 2020-08-15

## 2020-08-15 PROBLEM — R91.1 NODULE OF UPPER LOBE OF RIGHT LUNG: Status: ACTIVE | Noted: 2020-08-15

## 2020-08-31 ENCOUNTER — TELEPHONE (OUTPATIENT)
Dept: CARDIOTHORACIC SURGERY | Age: 60
End: 2020-08-31

## 2020-08-31 ENCOUNTER — OFFICE VISIT (OUTPATIENT)
Dept: CARDIOTHORACIC SURGERY | Age: 60
End: 2020-08-31
Payer: COMMERCIAL

## 2020-08-31 VITALS
SYSTOLIC BLOOD PRESSURE: 153 MMHG | HEART RATE: 124 BPM | DIASTOLIC BLOOD PRESSURE: 105 MMHG | TEMPERATURE: 97.3 F | HEIGHT: 72 IN | WEIGHT: 315 LBS | BODY MASS INDEX: 42.66 KG/M2 | OXYGEN SATURATION: 93 %

## 2020-08-31 PROCEDURE — 1036F TOBACCO NON-USER: CPT | Performed by: THORACIC SURGERY (CARDIOTHORACIC VASCULAR SURGERY)

## 2020-08-31 PROCEDURE — 99204 OFFICE O/P NEW MOD 45 MIN: CPT | Performed by: THORACIC SURGERY (CARDIOTHORACIC VASCULAR SURGERY)

## 2020-08-31 PROCEDURE — 3017F COLORECTAL CA SCREEN DOC REV: CPT | Performed by: THORACIC SURGERY (CARDIOTHORACIC VASCULAR SURGERY)

## 2020-08-31 PROCEDURE — G8427 DOCREV CUR MEDS BY ELIG CLIN: HCPCS | Performed by: THORACIC SURGERY (CARDIOTHORACIC VASCULAR SURGERY)

## 2020-08-31 PROCEDURE — G8417 CALC BMI ABV UP PARAM F/U: HCPCS | Performed by: THORACIC SURGERY (CARDIOTHORACIC VASCULAR SURGERY)

## 2020-08-31 NOTE — TELEPHONE ENCOUNTER
Sunny Vann  [id number] [phone] with Jose Patton   PFT [phone]  Precert has been started thru [facility]     Test to be done at [place] [address] to  [phone]

## 2020-08-31 NOTE — PROGRESS NOTES
CT/CV Surgery New Patient Office Visit      Patient's Name/Date of Birth: Luis Delaney / 1960 (56 y.o.)      PCP: Ko Srivastava MD, MD    Date: August 31, 2020     CC: Right upper lobe lung nodule, biopsy-proven adenocarcinoma of lung. HPI:   We had the pleasure of seeing Luis Delaney in the office today, as you know this is a very pleasant 56y.o. year old male with a history of diabetes mellitus, obesity, non-Hodgkin's lymphoma, status post chemotherapy 6 cycles until May 2020, who was found to have a right upper lobe lung nodule on a surveillance chest CT scan 6-month ago. Follow-up CT scan shows increase in size of the nodule. A PET scan shows elevated SUV up to over 30 in the right upper lobe lung nodule. He had a percutaneous biopsy which shows primary lung adenocarcinoma. He came to our cardiothoracic surgery office for surgical opinion. He reports 3 pounds of weight loss over last 6 months. He denied any pleuritic type of chest pain, or right shoulder pain. He had cardiac stress test approximately 3 years ago. He denied any history of coronary disease. He is 6 foot tall and weighs 340 pounds. PastMedical History:  Max  has a past medical history of Acid reflux, Osteoarthritis, and Type 2 diabetes mellitus without complication ([address] Past Surgical History:  The patient  has a past surgical history that includes Colonoscopy; Dilatation, esophagus; Tunneled venous port placement; and CT NEEDLE BIOPSY LUNG PERCUTANEOUS (8/4/2020). Allergies: The patient has No Known Allergies.     Medications:    Current Outpatient Medications:     promethazine (PHENERGAN) 25 MG tablet, TAKE ONE TABLET BY MOUTH EVERY 6 HOURS AS NEEDED FOR NAUSEA, Disp: 60 tablet, Rfl: 3    prochlorperazine (COMPAZINE) 10 MG tablet, Take 1 tablet by mouth every 6 hours as needed (nausea), Disp: 120 tablet, Rfl: 3    glipiZIDE (GLUCOTROL XL) 5 MG extended release tablet, Take 5 mg by mouth, Disp: , Rfl:    metFORMIN (GLUCOPHAGE) 1000 MG tablet, , Disp: , Rfl:     aspirin 81 MG tablet, Take 81 mg by mouth daily, Disp: , Rfl:     Family History: This patient's family history includes Diabetes in his brother and sister; Heart Disease in his father; No Known Problems in his mother. Social History:  Nabil  reports that he has never smoked. He has never used smokeless tobacco. He reports previous alcohol use. He reports current drug use. Drug: Marijuana. Vital Signs:   BP (!) 151/104   Pulse 124   Temp 97.3 °F (36.3 °C)   Ht 6' (1.829 m)   Wt (!) 336 lb 3.2 oz (152.5 kg)   SpO2 93%   BMI 45.60 kg/m²     ROS:  Constitutional: Weight loss of 3 pounds over last 6 months. HENT: Negative for congestion, facial swelling, sore throat, and changes in voice. Eyes: Negative for photophobia, redness, itching and visual disturbance. Respiratory: Negative for apnea, choking, shortness of breath, wheezing and stridor. Cardiovascular: Negative for chest pain, palpitations and leg swelling. Gastrointestinal: Negative for abdominal distention, constipation, nausea and vomiting. Endocrine: Negative for cold intolerance, heat intolerance, polyphagia and polyuria. Musculoskeletal: Negative for arthralgias, gait problem, and myalgias. Skin: Negative for color change, rash and wound. Allergic/Immunologic: Negative for food allergies and immunocompromised state. Neurological: Negative for dizziness, tremors, speech difficulty, weakness, numbness and headaches. Hematological: Negative for adenopathy. Does not bruise/bleed easily. Psychiatric/Behavioral: Negative for agitation, confusion, and dysphoric mood. Physical Exam:   General appearance:  No apparent distress, appears stated age and cooperative. HEENT:  Normal cephalic, atraumatic without obvious deformity. Conjunctivae/corneas clear. Neck: Supple, with full range of motion. No jugular venous distention. Trachea midline.   Respiratory:  Normal respiratory effort. Clear to auscultation, bilaterally without rales/wheezes/rhonchi. Cardiovascular:  Regular rate and rhythm with normal S1/S2 without murmurs, rubs or gallops. Abdomen: Soft, non-tender, non-distended with normal bowel sounds. Musculoskeletal:  No clubbing, cyanosis or edema bilaterally. Full range of motion without deformity. No pretibial edema. Skin: Skin color, texture, turgor normal.  No rashes or lesions. Neurologic:  Neurovascularly intact without any focal sensory/motor deficits. Psychiatric:  Alert and oriented, thought content appropriate, normal insight. Capillary Refill: Brisk,< 3 seconds   Peripheral Pulses: +2 palpable, equal bilaterally. Labs:    CBC:  Lab Results   Component Value Date    WBC 5.0 08/11/2020    HGB 14.6 08/11/2020    HCT 43.8 08/11/2020    MCV 88 08/11/2020     08/11/2020    INR 0.95 05/23/2019     BMP:   Lab Results   Component Value Date     07/21/2020     11/05/2019    K 3.9 07/21/2020    K 4.6 11/05/2019    K 5.7 05/23/2019    CL 97 11/05/2019    CO2 22 07/21/2020    BUN 19 07/21/2020    CREATININE 1.0 08/04/2020    MG 2.1 11/05/2019       Imaging  I have reviewed all imaging. 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 Thank you for allowing us to be involved in the patient's care.     Electronically by Juanpablo Curry MD  on 8/31/2020 at 2:18 PM

## 2020-09-01 ENCOUNTER — TELEPHONE (OUTPATIENT)
Dept: CARDIOTHORACIC SURGERY | Age: 60
End: 2020-09-01

## 2020-09-01 NOTE — TELEPHONE ENCOUNTER
Patient's mother Ollie Garcia returned phone call to office. I was able to speak with patient. Informed him to complete COVID-19 testing tomorrow at preferred location [facility] - order faxed to [phone]). Also informed him of PFT at [facility] on 9/8/20 at 11:00AM and ECHO at [facility] on 9/11/20 at 1:00PM. OV with Dr. Alyce Starr on 9/14/20 at 3:30PM.    Sending appt reminders to patient via mail today. He voiced understanding.

## 2020-09-01 NOTE — TELEPHONE ENCOUNTER
Per Dr. Kadi Macias request, stress test order can be cancelled. Patient will now need an ECHO (scheduled on 9/11/20 at 1:00PM at [facility]) and PFT (scheduled on 9/8/20 at 11:00AM at [facility]). Patient will need to complete COVID-19 test tomorrow 9/2/20 in order to have PFT study. OV to discuss results on 9/14/20 at 3:30PM.    Attempted to contact patient on home and mobile phone. Attempted to contact patient's spouse Faby Anderson but could not LM. Will attempt to call later today.

## 2020-09-02 NOTE — TELEPHONE ENCOUNTER
Per other phone encounter,  Heather Lynch has rescheduled everything and moved the PFT to [facility]. She will take care of the covid testing and re doing the precerts.

## 2020-09-04 NOTE — TELEPHONE ENCOUNTER
Spoke to Patricia from [facility] outpatient lab and she informed me that patient did come in for COVID-19 test on 9/2/20. She expects results tomorrow. Informed Jerrod Humphrey at [facility] that I will call to retreive results on Tuesday morning prior to procedure and to keep patient scheduled.

## 2020-09-08 ENCOUNTER — HOSPITAL ENCOUNTER (OUTPATIENT)
Dept: PULMONOLOGY | Age: 60
Discharge: HOME | End: 2020-09-08
Payer: COMMERCIAL

## 2020-09-08 PROCEDURE — 94060 EVALUATION OF WHEEZING: CPT

## 2020-09-08 PROCEDURE — 94726 PLETHYSMOGRAPHY LUNG VOLUMES: CPT

## 2020-09-08 PROCEDURE — 94729 DIFFUSING CAPACITY: CPT

## 2020-09-14 ENCOUNTER — OFFICE VISIT (OUTPATIENT)
Dept: CARDIOTHORACIC SURGERY | Age: 60
End: 2020-09-14
Payer: COMMERCIAL

## 2020-09-14 VITALS
TEMPERATURE: 97.3 F | HEART RATE: 113 BPM | SYSTOLIC BLOOD PRESSURE: 132 MMHG | DIASTOLIC BLOOD PRESSURE: 95 MMHG | WEIGHT: 315 LBS | BODY MASS INDEX: 42.66 KG/M2 | HEIGHT: 72 IN | OXYGEN SATURATION: 94 %

## 2020-09-14 PROCEDURE — G8417 CALC BMI ABV UP PARAM F/U: HCPCS | Performed by: THORACIC SURGERY (CARDIOTHORACIC VASCULAR SURGERY)

## 2020-09-14 PROCEDURE — 1036F TOBACCO NON-USER: CPT | Performed by: THORACIC SURGERY (CARDIOTHORACIC VASCULAR SURGERY)

## 2020-09-14 PROCEDURE — G8427 DOCREV CUR MEDS BY ELIG CLIN: HCPCS | Performed by: THORACIC SURGERY (CARDIOTHORACIC VASCULAR SURGERY)

## 2020-09-14 PROCEDURE — 3017F COLORECTAL CA SCREEN DOC REV: CPT | Performed by: THORACIC SURGERY (CARDIOTHORACIC VASCULAR SURGERY)

## 2020-09-14 PROCEDURE — 99213 OFFICE O/P EST LOW 20 MIN: CPT | Performed by: THORACIC SURGERY (CARDIOTHORACIC VASCULAR SURGERY)

## 2020-09-14 RX ORDER — TRAZODONE HYDROCHLORIDE 50 MG/1
50 TABLET ORAL NIGHTLY
COMMUNITY
End: 2020-10-14 | Stop reason: ALTCHOICE

## 2020-09-14 NOTE — PROGRESS NOTES
CT/CV Surgery Follow Up Office Visit      Patient's Name/Date of Birth: Luis Delaney / 1960 (56 y.o.)    PCP: Ko Srivastava MD, MD    Date: September 14, 2020    We had the pleasure of seeing Nabil Naidu in the office today, as you know this is a very pleasant 56y.o. year old male with a history of adenocarcinoma of the lower right upper lobe of lung. He also had 6 cycles of chemotherapy for non-Hodgkin's lymphoma. The last chemotherapy was in May 2020. He had a cardiac catheterization in May 2019, which shows no significant coronary artery disease. He had a 2D echocardiogram done at [place] [address] on September 11, 2020. This shows no wall motion abnormality but ejection fraction was reduced to 45%. He denied any recent episode of midsternal chest pain, or coronary artery disease. PastMedical History:  Nabil  has a past medical history of Acid reflux, Osteoarthritis, and Type 2 diabetes mellitus without complication ([facility] [address] Past Surgical History:  The patient  has a past surgical history that includes Colonoscopy; Dilatation, esophagus; Tunneled venous port placement; and CT NEEDLE BIOPSY LUNG PERCUTANEOUS (8/4/2020). Allergies: The patient has No Known Allergies. Medications:    Current Outpatient Medications:     traZODone (DESYREL) 50 MG tablet, Take 50 mg by mouth nightly, Disp: , Rfl:     promethazine (PHENERGAN) 25 MG tablet, TAKE ONE TABLET BY MOUTH EVERY 6 HOURS AS NEEDED FOR NAUSEA, Disp: 60 tablet, Rfl: 3    prochlorperazine (COMPAZINE) 10 MG tablet, Take 1 tablet by mouth every 6 hours as needed (nausea), Disp: 120 tablet, Rfl: 3    glipiZIDE (GLUCOTROL XL) 5 MG extended release tablet, Take 5 mg by mouth, Disp: , Rfl:     metFORMIN (GLUCOPHAGE) 1000 MG tablet, , Disp: , Rfl:     aspirin 81 MG tablet, Take 81 mg by mouth daily, Disp: , Rfl:     Family History: This patient's family history includes Diabetes in his brother and sister;  Heart Date     07/21/2020     11/05/2019    K 3.9 07/21/2020    K 4.6 11/05/2019    K 5.7 05/23/2019    CL 97 11/05/2019    CO2 22 07/21/2020    BUN 19 07/21/2020    CREATININE 1.0 08/04/2020    MG 2.1 11/05/2019       Imaging      Problem List:  Patient Active Problem List   Diagnosis    Angina effort    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 This might be related to chemotherapy. Plan 9/14/20:  1) we will arrange for a nuclear cardiac stress test prior to right lung surgery. Thank you for allowing us to be involved in the patient's care.     Electronically by Giovani Ackerman MD  on 9/14/2020 at 3:50 PM

## 2020-09-17 ENCOUNTER — HOSPITAL ENCOUNTER (OUTPATIENT)
Dept: NON INVASIVE DIAGNOSTICS | Age: 60
Discharge: HOME | End: 2020-09-17
Payer: COMMERCIAL

## 2020-09-17 VITALS — BODY MASS INDEX: 44.1 KG/M2 | WEIGHT: 315 LBS | HEIGHT: 71 IN

## 2020-09-17 PROCEDURE — 6360000002 HC RX W HCPCS

## 2020-09-17 PROCEDURE — 93017 CV STRESS TEST TRACING ONLY: CPT | Performed by: NUCLEAR MEDICINE

## 2020-09-17 PROCEDURE — 78452 HT MUSCLE IMAGE SPECT MULT: CPT

## 2020-09-17 PROCEDURE — 3430000000 HC RX DIAGNOSTIC RADIOPHARMACEUTICAL: Performed by: THORACIC SURGERY (CARDIOTHORACIC VASCULAR SURGERY)

## 2020-09-17 PROCEDURE — A9500 TC99M SESTAMIBI: HCPCS | Performed by: THORACIC SURGERY (CARDIOTHORACIC VASCULAR SURGERY)

## 2020-09-17 RX ADMIN — Medication 31.8 MILLICURIE: at 08:12

## 2020-09-17 RX ADMIN — Medication 8.8 MILLICURIE: at 07:13

## 2020-09-21 ENCOUNTER — OFFICE VISIT (OUTPATIENT)
Dept: CARDIOTHORACIC SURGERY | Age: 60
End: 2020-09-21
Payer: COMMERCIAL

## 2020-09-21 VITALS
SYSTOLIC BLOOD PRESSURE: 142 MMHG | BODY MASS INDEX: 44.1 KG/M2 | WEIGHT: 315 LBS | HEART RATE: 116 BPM | DIASTOLIC BLOOD PRESSURE: 95 MMHG | HEIGHT: 71 IN | OXYGEN SATURATION: 96 % | TEMPERATURE: 97.1 F

## 2020-09-21 PROCEDURE — 99213 OFFICE O/P EST LOW 20 MIN: CPT | Performed by: THORACIC SURGERY (CARDIOTHORACIC VASCULAR SURGERY)

## 2020-09-21 PROCEDURE — 3017F COLORECTAL CA SCREEN DOC REV: CPT | Performed by: THORACIC SURGERY (CARDIOTHORACIC VASCULAR SURGERY)

## 2020-09-21 PROCEDURE — 1036F TOBACCO NON-USER: CPT | Performed by: THORACIC SURGERY (CARDIOTHORACIC VASCULAR SURGERY)

## 2020-09-21 PROCEDURE — G8427 DOCREV CUR MEDS BY ELIG CLIN: HCPCS | Performed by: THORACIC SURGERY (CARDIOTHORACIC VASCULAR SURGERY)

## 2020-09-21 PROCEDURE — G8417 CALC BMI ABV UP PARAM F/U: HCPCS | Performed by: THORACIC SURGERY (CARDIOTHORACIC VASCULAR SURGERY)

## 2020-09-21 NOTE — PROGRESS NOTES
CT/CV Surgery Follow Up Office Visit      Patient's Name/Date of Birth: Jessica Farias / 1960 (56 y.o.)    PCP: Miladis Loomis MD, MD    Date: September 21, 2020    We had the pleasure of seeing Jessica Farias in the office today, as you know this is a very pleasant 56y.o. year old male with a history of non-Hodgkin's lymphoma, status post 6 cycles of chemotherapy with the last treatment finished in late May 2020, who was found to have a adenocarcinoma of right upper lobe of lung. He returned to [facility] thoracic surgery clinic for follow-up. His preoperative work-up shows the following. Left ventricle ejection fraction approximately 45%. Nuclear stress test negative for ischemia. Pulmonary function testing; FEV1 is 3.8 L, 105% of predicted value. PET scan shows elevated SUV in the right upper lobe lung lesion without evidence of distant metastasis. He reports that he lost approximately 20 to 30 pounds. He denied any fever, chill, productive cough, midsternal chest pain, or right-sided pleuritic type of chest pain. PastMedical History:  Max  has a past medical history of Acid reflux, Osteoarthritis, and Type 2 diabetes mellitus without complication ([facility] [address] Past Surgical History:  The patient  has a past surgical history that includes Colonoscopy; Dilatation, esophagus; Tunneled venous port placement; and CT NEEDLE BIOPSY LUNG PERCUTANEOUS (8/4/2020). Allergies: The patient has No Known Allergies.     Medications:    Current Outpatient Medications:     traZODone (DESYREL) 50 MG tablet, Take 50 mg by mouth nightly, Disp: , Rfl:     promethazine (PHENERGAN) 25 MG tablet, TAKE ONE TABLET BY MOUTH EVERY 6 HOURS AS NEEDED FOR NAUSEA, Disp: 60 tablet, Rfl: 3    prochlorperazine (COMPAZINE) 10 MG tablet, Take 1 tablet by mouth every 6 hours as needed (nausea), Disp: 120 tablet, Rfl: 3    glipiZIDE (GLUCOTROL XL) 5 MG extended release tablet, Take 5 mg by mouth, Disp: , Rfl:     metFORMIN (GLUCOPHAGE) 1000 MG tablet, , Disp: , Rfl:     aspirin 81 MG tablet, Take 81 mg by mouth daily, Disp: , Rfl:     Family History: This patient's family history includes Diabetes in his brother and sister; Heart Disease in his father; No Known Problems in his mother. Social History:  Nabil  reports that he has never smoked. He has never used smokeless tobacco. He reports previous alcohol use. He reports current drug use. Drug: Marijuana. Vital Signs:   BP (!) 137/96   Pulse 109   Temp 97.1 °F (36.2 °C)   Ht 5' 11\" (1.803 m)   Wt (!) 332 lb 12.8 oz (151 kg)   SpO2 96%   BMI 46.42 kg/m²     ROS:   Constitutional: Negative for activity change, chills, fatigue, fever and unexpected weight change. Respiratory: Negative for apnea, shortness of breath, wheezing and stridor. Cardiovascular: Negative for chest pain, palpitations and leg swelling. Gastrointestinal: Negative for hematochezia, melana, constipation, and N/V/D. Musculoskeletal: Negative for myalgias  Skin: Negative for color change, rash and wound. Neurological: Negative for dizziness or syncope. Physical Exam:  General appearance:  No acute distress, appears stated age and cooperative. Neck: No jugular venous distention. Trachea midline. No carotid bruits. Respiratory:  Normal respiratory effort. Clear to auscultation, bilaterally without Rales/Wheezes/Rhonch. Cardiovascular:  Regular rate and rhythm with normal S1/S2 without murmurs, rubs or gallops. Abdomen: Soft, non-tender, non-distended with normal bowel sounds. Ext: No clubbing, cyanosis or edema bilaterally. Full range of motion without deformity. Skin: Skin color, texture, turgor normal.  No rashes or lesions. Neurologic:  Neurovascularly intact without any focal sensory/motor deficits. Psychiatric:  Alert and oriented, thought content appropriate, normal insight.    Capillary Refill: Brisk,< 3 seconds   Peripheral Pulses: +2 palpable, equal bilaterally     Labs: CBC:  Lab Results   Component Value Date    WBC 5.0 08/11/2020    HGB 14.6 08/11/2020    HCT 43.8 08/11/2020    MCV 88 08/11/2020     08/11/2020    INR 0.95 05/23/2019     BMP:   Lab Results   Component Value Date     07/21/2020     11/05/2019    K 3.9 07/21/2020    K 4.6 11/05/2019    K 5.7 05/23/2019    CL 97 11/05/2019    CO2 22 07/21/2020    BUN 19 07/21/2020    CREATININE 1.0 08/04/2020    MG 2.1 11/05/2019       Imaging I have reviewed all preop work-up. 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 In recent chemotherapy for non-Hodgkin's lymphoma. Plan 9/21/20:  1) I will discuss with his oncologist for the timing of surgery. Thank you for allowing us to be involved in the patient's care.     Electronically by Demetrius Lowe MD  on 9/21/2020 at 10:45 AM

## 2020-09-22 ENCOUNTER — HOSPITAL ENCOUNTER (OUTPATIENT)
Dept: INFUSION THERAPY | Age: 60
Discharge: HOME | End: 2020-09-22
Payer: COMMERCIAL

## 2020-09-22 ENCOUNTER — OFFICE VISIT (OUTPATIENT)
Dept: ONCOLOGY | Age: 60
End: 2020-09-22
Payer: COMMERCIAL

## 2020-09-22 VITALS
OXYGEN SATURATION: 96 % | BODY MASS INDEX: 44.1 KG/M2 | TEMPERATURE: 97.9 F | HEIGHT: 71 IN | RESPIRATION RATE: 18 BRPM | HEART RATE: 91 BPM | WEIGHT: 315 LBS | SYSTOLIC BLOOD PRESSURE: 135 MMHG | DIASTOLIC BLOOD PRESSURE: 87 MMHG

## 2020-09-22 DIAGNOSIS — C82.58 DIFFUSE FOLLICLE CENTER LYMPHOMA OF LYMPH NODES OF MULTIPLE REGIONS (HCC): Primary | ICD-10-CM

## 2020-09-22 DIAGNOSIS — Z51.11 ENCOUNTER FOR CHEMOTHERAPY MANAGEMENT: ICD-10-CM

## 2020-09-22 PROCEDURE — 6360000002 HC RX W HCPCS: Performed by: INTERNAL MEDICINE

## 2020-09-22 PROCEDURE — 99212 OFFICE O/P EST SF 10 MIN: CPT

## 2020-09-22 PROCEDURE — G8417 CALC BMI ABV UP PARAM F/U: HCPCS | Performed by: INTERNAL MEDICINE

## 2020-09-22 PROCEDURE — 99213 OFFICE O/P EST LOW 20 MIN: CPT

## 2020-09-22 PROCEDURE — 2580000003 HC RX 258: Performed by: INTERNAL MEDICINE

## 2020-09-22 PROCEDURE — 99213 OFFICE O/P EST LOW 20 MIN: CPT | Performed by: INTERNAL MEDICINE

## 2020-09-22 PROCEDURE — G8427 DOCREV CUR MEDS BY ELIG CLIN: HCPCS | Performed by: INTERNAL MEDICINE

## 2020-09-22 PROCEDURE — 3017F COLORECTAL CA SCREEN DOC REV: CPT | Performed by: INTERNAL MEDICINE

## 2020-09-22 PROCEDURE — 1036F TOBACCO NON-USER: CPT | Performed by: INTERNAL MEDICINE

## 2020-09-22 RX ORDER — SODIUM CHLORIDE 0.9 % (FLUSH) 0.9 %
10 SYRINGE (ML) INJECTION PRN
Status: CANCELLED | OUTPATIENT
Start: 2020-09-22

## 2020-09-22 RX ORDER — HEPARIN SODIUM (PORCINE) LOCK FLUSH IV SOLN 100 UNIT/ML 100 UNIT/ML
500 SOLUTION INTRAVENOUS PRN
Status: DISCONTINUED | OUTPATIENT
Start: 2020-09-22 | End: 2020-09-23 | Stop reason: HOSPADM

## 2020-09-22 RX ORDER — SODIUM CHLORIDE 0.9 % (FLUSH) 0.9 %
20 SYRINGE (ML) INJECTION PRN
Status: CANCELLED | OUTPATIENT
Start: 2020-09-22

## 2020-09-22 RX ORDER — HEPARIN SODIUM (PORCINE) LOCK FLUSH IV SOLN 100 UNIT/ML 100 UNIT/ML
500 SOLUTION INTRAVENOUS PRN
Status: CANCELLED | OUTPATIENT
Start: 2020-09-22

## 2020-09-22 RX ORDER — SODIUM CHLORIDE 0.9 % (FLUSH) 0.9 %
20 SYRINGE (ML) INJECTION PRN
Status: DISCONTINUED | OUTPATIENT
Start: 2020-09-22 | End: 2020-09-23 | Stop reason: HOSPADM

## 2020-09-22 RX ADMIN — Medication 20 ML: at 15:25

## 2020-09-22 RX ADMIN — Medication 500 UNITS: at 15:25

## 2020-09-22 NOTE — PATIENT INSTRUCTIONS
1.  Return to clinic to see me approximately 1 month after his upcoming thoracic surgery has been completed.

## 2020-09-22 NOTE — PLAN OF CARE
Care plan reviewed with patient. Patient  verbalized understanding of the plan of care and contribute to goal setting. Problem: Discharge Planning  Goal: Knowledge of discharge instructions  Description: Knowledge of discharge instructions  Outcome: Met This Shift  Note: Verbalized understanding of discharge instructions, follow ups and when to call doctor   Intervention: Discharge to appropriate level of care  Note: Discuss discharge instructions, follow ups and when to call doctor. Problem: Falls - Risk of:  Goal: Will remain free from falls  Description: Will remain free from falls  Outcome: Met This Shift  Note: Free from falls while in infusion center. Verbalized understanding of fall prevention and to ask for assistance with ambulation   Intervention: Assess risk factors for falls  Description: Assess risk factors for falls  Note: Assess for fall risk, instruct to ask for assistance with ambulation      Problem: Infection - Central Venous Catheter-Associated Bloodstream Infection:  Goal: Will show no infection signs and symptoms  Description: Will show no infection signs and symptoms  Outcome: Met This Shift  Note: Mediport site with no redness or warmth. Skin over port intact with no signs of breakdown noted. Patient verbalizes signs/symptoms of port infection and when to notify the physician.     Intervention: Infection risk assessment  Description: Infection risk assessment  Note: Discuss port maintenance, infection prevention, signs and when to call

## 2020-09-22 NOTE — PROGRESS NOTES
Port flushed per protocol. Tolerated well. Discharge in satisfactory condition.  Ambulated off unit per self with LPN for appointment with Dr Angelo Baron

## 2020-09-23 ENCOUNTER — TELEPHONE (OUTPATIENT)
Dept: ONCOLOGY | Age: 60
End: 2020-09-23

## 2020-09-23 NOTE — TELEPHONE ENCOUNTER
Dr Gracie Forman called and wanting to speak with Dr Manuel Thompson regarding when he can perform lung surgery?

## 2020-09-24 ENCOUNTER — PREP FOR PROCEDURE (OUTPATIENT)
Dept: CARDIOTHORACIC SURGERY | Age: 60
End: 2020-09-24

## 2020-09-24 ENCOUNTER — TELEPHONE (OUTPATIENT)
Dept: CARDIOLOGY CLINIC | Age: 60
End: 2020-09-24

## 2020-09-24 ENCOUNTER — TELEPHONE (OUTPATIENT)
Dept: CARDIOTHORACIC SURGERY | Age: 60
End: 2020-09-24

## 2020-09-24 PROCEDURE — APPSS30 APP SPLIT SHARED TIME 16-30 MINUTES: Performed by: PHYSICIAN ASSISTANT

## 2020-09-24 RX ORDER — SODIUM CHLORIDE 0.9 % (FLUSH) 0.9 %
10 SYRINGE (ML) INJECTION EVERY 12 HOURS SCHEDULED
Status: CANCELLED | OUTPATIENT
Start: 2020-09-24

## 2020-09-24 RX ORDER — SODIUM CHLORIDE 0.9 % (FLUSH) 0.9 %
10 SYRINGE (ML) INJECTION PRN
Status: CANCELLED | OUTPATIENT
Start: 2020-09-24

## 2020-09-24 NOTE — TELEPHONE ENCOUNTER
Urgent Request   [facility]   Office notes,   Needle bx  Chest CT     Faxed to  [phone]

## 2020-09-24 NOTE — PROGRESS NOTES
I Left Message for Dr. Werner Avelar office we need orders for 12 Lead EKG and PA and Lateral CXR for PAT Visit on 9/28/20.

## 2020-09-24 NOTE — TELEPHONE ENCOUNTER
Spoke to Poonam Hernandez. Ok to proceed with surgery on 9/30/20. Arrive to [facility] at 5:30 AM for surgery starting at 7:30 AM. Informed patient to get COVID-19 test today or tomorrow - he will go to [facility] (faxed order to [phone]). Hold metformin starting on 9/28/20. Go to [facility] visit at [place] [address] on 9/28/20 at 9 AM. Asked patient if he had any questions. He declined and voice all understanding. Will send special surgery instructions via mail ASAP.

## 2020-09-24 NOTE — PROGRESS NOTES
PAT appointment reminder call  Arrival time 9 am and location given  Bring drivers license and insurance  Bring list of medications with dosage and frequency  If possible bring caregiver for appointment  Appointment may last 2 hours    Patient getting Covid test today at [place] [address]

## 2020-09-25 NOTE — PROGRESS NOTES
[facility]  CANCER NETWORK OF [facility]  [facility] [address]  [address]  [address]  Dept: [phone]  Dept Fax: [phone]  Loc: [phone]     Subjective:      Chief Complaint:  Nabil Sullivan is a 56 y.o. with lymphoma and adenocarcinoma of the lung. HPI:  The patient is here today for follow-up regarding his history of non-Hodgkin's lymphoma as well as history of adenocarcinoma of the lung. He is recently met with Dr. Schuyler Carter from cardiothoracic surgery for surgical evaluation and surgical intervention. He is scheduled to undergo surgical resection of his malignancy in the near future with Dr. Schuyler Carter. On today's evaluation the patient has no signs or symptoms that be suggestive of recurrence of his lymphoma. He does not have symptoms related to his adenocarcinoma of the lung. The patient denies skeletal pain. He has not had fever, cough, shortness of breath or other signs of infection. The patient's bowel and bladder habits have been normal.  He has not seen blood in his stool or urine. The patient remains active with an ECOG performance status of level 0. PMH, SH, and FH:  I reviewed the patients medication list and allergy list as noted on the electronic medical record. The PMH, SH and FH were also reviewed as noted on the EMR. Review of Systems  Constitutional: Negative. HENT: Negative. Eyes: Negative. Respiratory: Negative. Cardiovascular: Negative. Gastrointestinal: Negative. Genitourinary: Negative. Musculoskeletal: Negative. Skin: Negative. Neurological: Negative. Hematological: Negative. Psychiatric/Behavioral: Negative. Objective:   Physical Exam  Vitals:    09/22/20 1522   BP: 135/87   Pulse: 91   Resp: 18   Temp: 97.9 °F (36.6 °C)   SpO2: 96%   Vitals reviewed and are stable. Constitutional: Well-developed and well-nourished. No acute distress. HENT: Normocephalic and atraumatic. Eyes: Pupils are equal and reactive. No scleral icterus. Neck: Overall appearance is symmetrical. No identifiable masses. Pulmonary: Effort normal. No respiratory distress. Cardiovascular: RRR. No edema in any of the four extremities. Abdominal: Soft. No hepatomegaly or splenomegaly. Musculoskeletal: Gait is normal. Muscle strength and tone good. Neurological: Alert and oriented to person, place, and time. Judgment and thought content normal.  Skin: Skin is warm and dry. No rash. Psychiatric: Mood and affect appropriate for the clinical situation. Behavior is normal.      Assessment:   1. Follicular lymphoma stage III. 2.  Adenocarcinoma of the lung. Plan:   1. Proceed with surgical intervention for adenocarcinoma outlined by Dr. Ashlee Bird. 2.  Monitor for recurrence of lymphoma. Dontae Lewis M.D. Medical Director: [facility]  Cancer Network [address] [facility], [address] [place] [address], [address] [place] [address] of the [facility] at [facility]      **This report has been created using voice recognition software. It may contain minor errors which are inherent in voice recognition technology. **

## 2020-09-28 ENCOUNTER — HOSPITAL ENCOUNTER (OUTPATIENT)
Dept: PREADMISSION TESTING | Age: 60
Discharge: HOME | DRG: 120 | End: 2020-10-02
Payer: COMMERCIAL

## 2020-09-28 VITALS
BODY MASS INDEX: 42.66 KG/M2 | HEIGHT: 72 IN | SYSTOLIC BLOOD PRESSURE: 147 MMHG | TEMPERATURE: 98 F | WEIGHT: 315 LBS | DIASTOLIC BLOOD PRESSURE: 93 MMHG | OXYGEN SATURATION: 93 %

## 2020-09-28 LAB
ABO: NORMAL
ANTIBODY SCREEN: NORMAL
AVERAGE GLUCOSE: 138 MG/DL (ref 70–126)
BILIRUBIN URINE: NEGATIVE
BLOOD, URINE: NEGATIVE
CHARACTER, URINE: CLEAR
COLOR: YELLOW
EKG ATRIAL RATE: 89 BPM
EKG P AXIS: 38 DEGREES
EKG P-R INTERVAL: 198 MS
EKG Q-T INTERVAL: 364 MS
EKG QRS DURATION: 108 MS
EKG QTC CALCULATION (BAZETT): 442 MS
EKG R AXIS: 128 DEGREES
EKG T AXIS: 29 DEGREES
EKG VENTRICULAR RATE: 89 BPM
ERYTHROCYTE [DISTWIDTH] IN BLOOD BY AUTOMATED COUNT: 13.7 % (ref 11.5–14.5)
ERYTHROCYTE [DISTWIDTH] IN BLOOD BY AUTOMATED COUNT: 44.4 FL (ref 35–45)
GLUCOSE, URINE: NEGATIVE MG/DL
HBA1C MFR BLD: 6.6 % (ref 4.4–6.4)
HCT VFR BLD CALC: 46.9 % (ref 42–52)
HEMOGLOBIN: 15.7 GM/DL (ref 14–18)
INR BLD: 0.95 (ref 0.85–1.13)
KETONES, URINE: ABNORMAL
LEUKOCYTE EST, POC: NEGATIVE
MCH RBC QN AUTO: 30 PG (ref 26–33)
MCHC RBC AUTO-ENTMCNC: 33.5 GM/DL (ref 32.2–35.5)
MCV RBC AUTO: 89.7 FL (ref 80–94)
MRSA SCREEN RT-PCR: NEGATIVE
NITRITE, URINE: NEGATIVE
PH UA: 5 (ref 5–9)
PLATELET # BLD: 206 THOU/MM3 (ref 130–400)
PMV BLD AUTO: 10.1 FL (ref 9.4–12.4)
PROTEIN UA: NEGATIVE MG/DL
RBC # BLD: 5.23 MILL/MM3 (ref 4.7–6.1)
RH FACTOR: NORMAL
SPECIFIC GRAVITY UA: 1.02 (ref 1–1.03)
UROBILINOGEN, URINE: 1 EU/DL (ref 0–1)
WBC # BLD: 5.3 THOU/MM3 (ref 4.8–10.8)

## 2020-09-28 PROCEDURE — 86900 BLOOD TYPING SEROLOGIC ABO: CPT

## 2020-09-28 PROCEDURE — 86923 COMPATIBILITY TEST ELECTRIC: CPT

## 2020-09-28 PROCEDURE — 36415 COLL VENOUS BLD VENIPUNCTURE: CPT

## 2020-09-28 PROCEDURE — 81003 URINALYSIS AUTO W/O SCOPE: CPT

## 2020-09-28 PROCEDURE — 83036 HEMOGLOBIN GLYCOSYLATED A1C: CPT

## 2020-09-28 PROCEDURE — 87641 MR-STAPH DNA AMP PROBE: CPT

## 2020-09-28 PROCEDURE — 85610 PROTHROMBIN TIME: CPT

## 2020-09-28 PROCEDURE — 86850 RBC ANTIBODY SCREEN: CPT

## 2020-09-28 PROCEDURE — 86901 BLOOD TYPING SEROLOGIC RH(D): CPT

## 2020-09-28 PROCEDURE — 85027 COMPLETE CBC AUTOMATED: CPT

## 2020-09-28 PROCEDURE — 87081 CULTURE SCREEN ONLY: CPT

## 2020-09-28 PROCEDURE — 93005 ELECTROCARDIOGRAM TRACING: CPT | Performed by: PHYSICIAN ASSISTANT

## 2020-09-28 NOTE — PROGRESS NOTES
In preparation for their surgical procedure above patient was screened for Obstructive Sleep Apnea (MARY) using the STOP-Bang Questionnaire by the Pre-Admission Testing department. This is a pre-surgical screening tool for patient safety and serves as a recommendation, this WILL NOT cause cancellation of surgery. STOP-Bang Questionnaire  * Do you currently see a pulmonologist?  No     If yes STOP, do not complete. Patient follows with Dr.     1.  Do you snore loudly (able to be heard in the next room)? No    2. Do you often feel tired or sleepy during the daytime? No       3. Has anyone ever told you that you stop breathing during your sleep? No    4. Do you have or are you being treated for high blood pressure? No      5. BMI more than 35? BMI (Calculated): 45.2        Yes    6. Age over 48 years? 56 y.o. Yes    7. Neck Circumference greater than 17 inches for male or 16 inches for female? Measured  19.5         (visits only)            Yes    8. Gender Male? Yes      TOTAL SCORE: 4    MARY - Low Risk : Yes to 0 - 2 questions  MARY - Intermediate Risk : Yes to 3 - 4 questions  MARY - High Risk : Yes to 5 - 8 questions    Adapted from:   STOP Questionnaire: A Tool to Screen Patients for Obstructive Sleep Apnea   FAITH Beck.P.C., Harshad Saez M.B.B.S., Feroz Up M.D., Katelyn Taylor. Je Mckeon, Ph.D., LESA Ochoa.B.B.S., Delmer Wall, M.Sc., Apurva Davis M.D., Eleuterio FAITH Lopez.P.C.    Anesthesiology 2008; 027:[id number] Copyright 2008, the [address] of [employer]esthesiologists, [address].   ----------------------------------------------------------------------------------------------------------------

## 2020-09-28 NOTE — PROGRESS NOTES
Covid screening questionnaire complete. States he has sore throat, states it is due to \"tooth pain\" Denies exposure see chart for documentation.   Please limit your exposure to the public after you have your covid test  Please call your doctor immediately if you develop any symptoms of covid prior to your surgery

## 2020-09-28 NOTE — PROGRESS NOTES
[employer]® K[employer]    __9/28______ (date)   FIRST SHOWER: Two days before surgery: Take a shower and wash your entire body, including your hair and scalp in the following manner:   Wash your hair using normal shampoo. Make sure you rinse the shampoo from your hair and body. Wash your face with your regular soap or cleanser.  Use one of the sponges in the [facility] kit and apply 1/3 of the Chlorhexidine soap to the sponge, wash from your neck down. This is very important. Do not use the soap on your face or hair and avoid private areas.  Rinse your body thoroughly. This is very important.  Using a fresh, clean towel, dry your body.  Dress in freshly washed clothes.  Do not use lotions, powders, or creams after this shower. __9/29______ (date)   SECOND SHOWER: The day before surgery: Take a shower and wash your entire body, including your hair and scalp in the following manner:   Wash your hair using normal shampoo. Make sure you rinse the shampoo from your hair and body. Wash your face with your regular soap or cleanser.  Use one of the sponges in the [facility] kit and apply 1/3 of the Chlorhexidine soap to the sponge, wash from your neck down. This is very important. Do not use the soap on your face or hair and avoid private areas.  Rinse your body thoroughly. This is very important.  Using a fresh, clean towel, dry your body.  Dress in freshly washed clothes.  Fresh clean sheets and pillow cases should be used after this shower.  Do not use lotions, powders, or creams after this shower.    ___9/30_____ (date)   THE FINAL SHOWER: The morning of surgery: Take a shower and wash your entire body, including your hair and scalp in the following manner:   Wash your hair using normal shampoo. Make sure you rinse the shampoo from your hair and body. Wash your face with your regular soap or cleanser.    Use one of the sponges in the [facility] kit and apply 1/3 of the Chlorhexidine soap to the sponge, wash from your neck down. This is very important. Do not use the soap on your face or hair and avoid private areas.  Rinse your body thoroughly. This is very important.  Using a fresh, clean towel, dry your body.  Dress warmly with freshly washed clean clothes. Keeping warm before surgery decreases your risk of developing and infection.  Do not use lotions, powders, or creams after this shower.

## 2020-09-28 NOTE — PROGRESS NOTES
NPO after midnight  Bring insurance info and 's license  Wear comfortable clean clothing  Do not bring jewelry   Shower as instructed prior to surgery  Bring medications in original bottles  Follow all instructions given by your physician   needed at discharge   Routine preop instructions given for pain, hand hygiene, fall prevention, infection prevention, anesthesia, and coughing and deep breathing.  Do not shave the surgical area! If needed, your nurse will use clippers to remove any excess hair at the surgical site when you come in for surgery. Do not use a razor on the site of your surgery for at least 2 days prior to surgery because shaving can leave tiny nicks in the skin which may allow germs to enter and cause infection.     Call [phone] for any questions

## 2020-09-28 NOTE — TELEPHONE ENCOUNTER
Spoke to Lizzie in the lab at [facility]. Patient had COVID-19 swab on 9/25/20 and the results are not back yet. She expects them to be back tomorrow or Wednesday. Will call back tomorrow to see if test is resulted.

## 2020-09-29 ENCOUNTER — PREP FOR PROCEDURE (OUTPATIENT)
Dept: INTERNAL MEDICINE CLINIC | Age: 60
End: 2020-09-29

## 2020-09-29 ENCOUNTER — ANESTHESIA EVENT (OUTPATIENT)
Dept: OPERATING ROOM | Age: 60
DRG: 120 | End: 2020-09-29
Payer: COMMERCIAL

## 2020-09-29 PROCEDURE — 93010 ELECTROCARDIOGRAM REPORT: CPT | Performed by: INTERNAL MEDICINE

## 2020-09-29 NOTE — PROGRESS NOTES
Addendum to Office note 09/21/2020. Tumor clinical staging    Primary lung adenocarcinoma, right upper lobe. Tumor size: 25 mm x 15 mm  cT1c    No mediastinal lymph node involvement by PET. cN0    No evidence of distance metastasis.  cM0    Stage I A 3

## 2020-09-29 NOTE — TELEPHONE ENCOUNTER
Spoke to  at [facility] lab. COVID-19 results back and are negative. Results scanned into media.

## 2020-09-30 ENCOUNTER — ANESTHESIA (OUTPATIENT)
Dept: OPERATING ROOM | Age: 60
DRG: 120 | End: 2020-09-30
Payer: COMMERCIAL

## 2020-09-30 ENCOUNTER — HOSPITAL ENCOUNTER (INPATIENT)
Age: 60
LOS: 5 days | Discharge: HOME | DRG: 120 | End: 2020-10-05
Attending: THORACIC SURGERY (CARDIOTHORACIC VASCULAR SURGERY) | Admitting: THORACIC SURGERY (CARDIOTHORACIC VASCULAR SURGERY)
Payer: COMMERCIAL

## 2020-09-30 ENCOUNTER — APPOINTMENT (OUTPATIENT)
Dept: GENERAL RADIOLOGY | Age: 60
DRG: 120 | End: 2020-09-30
Attending: THORACIC SURGERY (CARDIOTHORACIC VASCULAR SURGERY)
Payer: COMMERCIAL

## 2020-09-30 VITALS
RESPIRATION RATE: 3 BRPM | SYSTOLIC BLOOD PRESSURE: 137 MMHG | TEMPERATURE: 97.5 F | OXYGEN SATURATION: 94 % | DIASTOLIC BLOOD PRESSURE: 68 MMHG

## 2020-09-30 PROBLEM — Z85.118 HISTORY OF LUNG CANCER: Status: ACTIVE | Noted: 2020-09-30

## 2020-09-30 LAB
ABO: NORMAL
ANION GAP SERPL CALCULATED.3IONS-SCNC: 14 MEQ/L (ref 8–16)
ANTIBODY SCREEN: NORMAL
APTT: 29.9 SECONDS (ref 22–38)
BUN BLDV-MCNC: 17 MG/DL (ref 7–22)
CALCIUM SERPL-MCNC: 9.9 MG/DL (ref 8.5–10.5)
CHLORIDE BLD-SCNC: 106 MEQ/L (ref 98–111)
CO2: 23 MEQ/L (ref 23–33)
CREAT SERPL-MCNC: 0.9 MG/DL (ref 0.4–1.2)
ERYTHROCYTE [DISTWIDTH] IN BLOOD BY AUTOMATED COUNT: 13.9 % (ref 11.5–14.5)
ERYTHROCYTE [DISTWIDTH] IN BLOOD BY AUTOMATED COUNT: 45.7 FL (ref 35–45)
GFR SERPL CREATININE-BSD FRML MDRD: 85 ML/MIN/1.73M2
GLUCOSE BLD-MCNC: 185 MG/DL (ref 70–108)
GLUCOSE BLD-MCNC: 215 MG/DL (ref 70–108)
GLUCOSE BLD-MCNC: 258 MG/DL (ref 70–108)
HCT VFR BLD CALC: 46.7 % (ref 42–52)
HEMOGLOBIN: 15.5 GM/DL (ref 14–18)
INR BLD: 1.02 (ref 0.85–1.13)
MCH RBC QN AUTO: 29.8 PG (ref 26–33)
MCHC RBC AUTO-ENTMCNC: 33.2 GM/DL (ref 32.2–35.5)
MCV RBC AUTO: 89.8 FL (ref 80–94)
MRSA SCREEN RT-PCR: NEGATIVE
MRSA SCREEN: NORMAL
PLATELET # BLD: 208 THOU/MM3 (ref 130–400)
PMV BLD AUTO: 9.7 FL (ref 9.4–12.4)
POTASSIUM REFLEX MAGNESIUM: 4.1 MEQ/L (ref 3.5–5.2)
RBC # BLD: 5.2 MILL/MM3 (ref 4.7–6.1)
RH FACTOR: NORMAL
SODIUM BLD-SCNC: 143 MEQ/L (ref 135–145)
VANCOMYCIN RESISTANT ENTEROCOCCUS: NEGATIVE
VRE CULTURE: NORMAL
WBC # BLD: 5 THOU/MM3 (ref 4.8–10.8)

## 2020-09-30 PROCEDURE — 2720000010 HC SURG SUPPLY STERILE: Performed by: THORACIC SURGERY (CARDIOTHORACIC VASCULAR SURGERY)

## 2020-09-30 PROCEDURE — 2000000000 HC ICU R&B

## 2020-09-30 PROCEDURE — 88305 TISSUE EXAM BY PATHOLOGIST: CPT

## 2020-09-30 PROCEDURE — 2500000003 HC RX 250 WO HCPCS: Performed by: REGISTERED NURSE

## 2020-09-30 PROCEDURE — C1729 CATH, DRAINAGE: HCPCS | Performed by: THORACIC SURGERY (CARDIOTHORACIC VASCULAR SURGERY)

## 2020-09-30 PROCEDURE — 7100000001 HC PACU RECOVERY - ADDTL 15 MIN: Performed by: THORACIC SURGERY (CARDIOTHORACIC VASCULAR SURGERY)

## 2020-09-30 PROCEDURE — 3600000014 HC SURGERY LEVEL 4 ADDTL 15MIN: Performed by: THORACIC SURGERY (CARDIOTHORACIC VASCULAR SURGERY)

## 2020-09-30 PROCEDURE — 32482 BILOBECTOMY: CPT | Performed by: THORACIC SURGERY (CARDIOTHORACIC VASCULAR SURGERY)

## 2020-09-30 PROCEDURE — 2700000000 HC OXYGEN THERAPY PER DAY

## 2020-09-30 PROCEDURE — 85027 COMPLETE CBC AUTOMATED: CPT

## 2020-09-30 PROCEDURE — 88313 SPECIAL STAINS GROUP 2: CPT

## 2020-09-30 PROCEDURE — 94761 N-INVAS EAR/PLS OXIMETRY MLT: CPT

## 2020-09-30 PROCEDURE — 36415 COLL VENOUS BLD VENIPUNCTURE: CPT

## 2020-09-30 PROCEDURE — 88331 PATH CONSLTJ SURG 1 BLK 1SPC: CPT

## 2020-09-30 PROCEDURE — 2580000003 HC RX 258: Performed by: PHYSICIAN ASSISTANT

## 2020-09-30 PROCEDURE — 3700000000 HC ANESTHESIA ATTENDED CARE: Performed by: THORACIC SURGERY (CARDIOTHORACIC VASCULAR SURGERY)

## 2020-09-30 PROCEDURE — 82948 REAGENT STRIP/BLOOD GLUCOSE: CPT

## 2020-09-30 PROCEDURE — 85730 THROMBOPLASTIN TIME PARTIAL: CPT

## 2020-09-30 PROCEDURE — 6360000002 HC RX W HCPCS: Performed by: PHYSICIAN ASSISTANT

## 2020-09-30 PROCEDURE — 87641 MR-STAPH DNA AMP PROBE: CPT

## 2020-09-30 PROCEDURE — 88307 TISSUE EXAM BY PATHOLOGIST: CPT

## 2020-09-30 PROCEDURE — 7100000000 HC PACU RECOVERY - FIRST 15 MIN: Performed by: THORACIC SURGERY (CARDIOTHORACIC VASCULAR SURGERY)

## 2020-09-30 PROCEDURE — 32482 BILOBECTOMY: CPT | Performed by: PHYSICIAN ASSISTANT

## 2020-09-30 PROCEDURE — 85610 PROTHROMBIN TIME: CPT

## 2020-09-30 PROCEDURE — 2500000003 HC RX 250 WO HCPCS

## 2020-09-30 PROCEDURE — 38746 REMOVE THORACIC LYMPH NODES: CPT | Performed by: THORACIC SURGERY (CARDIOTHORACIC VASCULAR SURGERY)

## 2020-09-30 PROCEDURE — 87500 VANOMYCIN DNA AMP PROBE: CPT

## 2020-09-30 PROCEDURE — 6370000000 HC RX 637 (ALT 250 FOR IP): Performed by: PHYSICIAN ASSISTANT

## 2020-09-30 PROCEDURE — 86900 BLOOD TYPING SEROLOGIC ABO: CPT

## 2020-09-30 PROCEDURE — 0BTD0ZZ RESECTION OF RIGHT MIDDLE LUNG LOBE, OPEN APPROACH: ICD-10-PCS | Performed by: THORACIC SURGERY (CARDIOTHORACIC VASCULAR SURGERY)

## 2020-09-30 PROCEDURE — 2580000003 HC RX 258

## 2020-09-30 PROCEDURE — 3600000004 HC SURGERY LEVEL 4 BASE: Performed by: THORACIC SURGERY (CARDIOTHORACIC VASCULAR SURGERY)

## 2020-09-30 PROCEDURE — 6360000002 HC RX W HCPCS: Performed by: THORACIC SURGERY (CARDIOTHORACIC VASCULAR SURGERY)

## 2020-09-30 PROCEDURE — 87081 CULTURE SCREEN ONLY: CPT

## 2020-09-30 PROCEDURE — 99223 1ST HOSP IP/OBS HIGH 75: CPT | Performed by: INTERNAL MEDICINE

## 2020-09-30 PROCEDURE — 80048 BASIC METABOLIC PNL TOTAL CA: CPT

## 2020-09-30 PROCEDURE — 6360000002 HC RX W HCPCS: Performed by: ANESTHESIOLOGY

## 2020-09-30 PROCEDURE — 99253 IP/OBS CNSLTJ NEW/EST LOW 45: CPT | Performed by: NURSE PRACTITIONER

## 2020-09-30 PROCEDURE — 71045 X-RAY EXAM CHEST 1 VIEW: CPT

## 2020-09-30 PROCEDURE — 2709999900 HC NON-CHARGEABLE SUPPLY: Performed by: THORACIC SURGERY (CARDIOTHORACIC VASCULAR SURGERY)

## 2020-09-30 PROCEDURE — 2500000003 HC RX 250 WO HCPCS: Performed by: ANESTHESIOLOGY

## 2020-09-30 PROCEDURE — 86901 BLOOD TYPING SEROLOGIC RH(D): CPT

## 2020-09-30 PROCEDURE — 0BTC0ZZ RESECTION OF RIGHT UPPER LUNG LOBE, OPEN APPROACH: ICD-10-PCS | Performed by: THORACIC SURGERY (CARDIOTHORACIC VASCULAR SURGERY)

## 2020-09-30 PROCEDURE — APPSS60 APP SPLIT SHARED TIME 46-60 MINUTES: Performed by: PHYSICIAN ASSISTANT

## 2020-09-30 PROCEDURE — 86850 RBC ANTIBODY SCREEN: CPT

## 2020-09-30 PROCEDURE — 3700000001 HC ADD 15 MINUTES (ANESTHESIA): Performed by: THORACIC SURGERY (CARDIOTHORACIC VASCULAR SURGERY)

## 2020-09-30 PROCEDURE — 6360000002 HC RX W HCPCS

## 2020-09-30 PROCEDURE — 6370000000 HC RX 637 (ALT 250 FOR IP): Performed by: THORACIC SURGERY (CARDIOTHORACIC VASCULAR SURGERY)

## 2020-09-30 PROCEDURE — 07B70ZZ EXCISION OF THORAX LYMPHATIC, OPEN APPROACH: ICD-10-PCS | Performed by: THORACIC SURGERY (CARDIOTHORACIC VASCULAR SURGERY)

## 2020-09-30 PROCEDURE — 2500000003 HC RX 250 WO HCPCS: Performed by: THORACIC SURGERY (CARDIOTHORACIC VASCULAR SURGERY)

## 2020-09-30 PROCEDURE — 0BJ08ZZ INSPECTION OF TRACHEOBRONCHIAL TREE, VIA NATURAL OR ARTIFICIAL OPENING ENDOSCOPIC: ICD-10-PCS | Performed by: THORACIC SURGERY (CARDIOTHORACIC VASCULAR SURGERY)

## 2020-09-30 PROCEDURE — 38746 REMOVE THORACIC LYMPH NODES: CPT | Performed by: PHYSICIAN ASSISTANT

## 2020-09-30 RX ORDER — SODIUM CHLORIDE 0.9 % (FLUSH) 0.9 %
10 SYRINGE (ML) INJECTION EVERY 12 HOURS SCHEDULED
Status: DISCONTINUED | OUTPATIENT
Start: 2020-09-30 | End: 2020-10-05 | Stop reason: HOSPADM

## 2020-09-30 RX ORDER — LANOLIN ALCOHOL/MO/W.PET/CERES
3 CREAM (GRAM) TOPICAL NIGHTLY PRN
Status: DISCONTINUED | OUTPATIENT
Start: 2020-09-30 | End: 2020-10-05 | Stop reason: HOSPADM

## 2020-09-30 RX ORDER — GLIPIZIDE 5 MG/1
5 TABLET ORAL
Status: DISCONTINUED | OUTPATIENT
Start: 2020-10-01 | End: 2020-09-30

## 2020-09-30 RX ORDER — TRAZODONE HYDROCHLORIDE 50 MG/1
50 TABLET ORAL NIGHTLY
Status: DISCONTINUED | OUTPATIENT
Start: 2020-09-30 | End: 2020-10-05 | Stop reason: HOSPADM

## 2020-09-30 RX ORDER — DEXTROSE MONOHYDRATE 25 G/50ML
12.5 INJECTION, SOLUTION INTRAVENOUS PRN
Status: DISCONTINUED | OUTPATIENT
Start: 2020-09-30 | End: 2020-10-05 | Stop reason: HOSPADM

## 2020-09-30 RX ORDER — POTASSIUM CHLORIDE AND SODIUM CHLORIDE 450; 150 MG/100ML; MG/100ML
100 INJECTION, SOLUTION INTRAVENOUS CONTINUOUS
Status: DISCONTINUED | OUTPATIENT
Start: 2020-09-30 | End: 2020-09-30

## 2020-09-30 RX ORDER — DEXAMETHASONE SODIUM PHOSPHATE 4 MG/ML
INJECTION, SOLUTION INTRA-ARTICULAR; INTRALESIONAL; INTRAMUSCULAR; INTRAVENOUS; SOFT TISSUE PRN
Status: DISCONTINUED | OUTPATIENT
Start: 2020-09-30 | End: 2020-09-30 | Stop reason: SDUPTHER

## 2020-09-30 RX ORDER — DIPHENHYDRAMINE HYDROCHLORIDE 50 MG/ML
12.5 INJECTION INTRAMUSCULAR; INTRAVENOUS
Status: DISCONTINUED | OUTPATIENT
Start: 2020-09-30 | End: 2020-09-30 | Stop reason: HOSPADM

## 2020-09-30 RX ORDER — NICOTINE POLACRILEX 4 MG
15 LOZENGE BUCCAL PRN
Status: DISCONTINUED | OUTPATIENT
Start: 2020-09-30 | End: 2020-10-05 | Stop reason: HOSPADM

## 2020-09-30 RX ORDER — SODIUM CHLORIDE 0.9 % (FLUSH) 0.9 %
10 SYRINGE (ML) INJECTION PRN
Status: DISCONTINUED | OUTPATIENT
Start: 2020-09-30 | End: 2020-10-05 | Stop reason: HOSPADM

## 2020-09-30 RX ORDER — MEPERIDINE HYDROCHLORIDE 25 MG/ML
12.5 INJECTION INTRAMUSCULAR; INTRAVENOUS; SUBCUTANEOUS EVERY 5 MIN PRN
Status: DISCONTINUED | OUTPATIENT
Start: 2020-09-30 | End: 2020-09-30 | Stop reason: HOSPADM

## 2020-09-30 RX ORDER — ROPIVACAINE HYDROCHLORIDE 2 MG/ML
INJECTION, SOLUTION EPIDURAL; INFILTRATION; PERINEURAL PRN
Status: DISCONTINUED | OUTPATIENT
Start: 2020-09-30 | End: 2020-09-30 | Stop reason: SDUPTHER

## 2020-09-30 RX ORDER — ONDANSETRON 2 MG/ML
4 INJECTION INTRAMUSCULAR; INTRAVENOUS EVERY 6 HOURS PRN
Status: DISCONTINUED | OUTPATIENT
Start: 2020-09-30 | End: 2020-10-05 | Stop reason: HOSPADM

## 2020-09-30 RX ORDER — ROCURONIUM BROMIDE 10 MG/ML
INJECTION, SOLUTION INTRAVENOUS PRN
Status: DISCONTINUED | OUTPATIENT
Start: 2020-09-30 | End: 2020-09-30 | Stop reason: SDUPTHER

## 2020-09-30 RX ORDER — PROMETHAZINE HYDROCHLORIDE 25 MG/ML
12.5 INJECTION, SOLUTION INTRAMUSCULAR; INTRAVENOUS
Status: DISCONTINUED | OUTPATIENT
Start: 2020-09-30 | End: 2020-09-30 | Stop reason: HOSPADM

## 2020-09-30 RX ORDER — FENTANYL CITRATE 50 UG/ML
25 INJECTION, SOLUTION INTRAMUSCULAR; INTRAVENOUS EVERY 5 MIN PRN
Status: DISCONTINUED | OUTPATIENT
Start: 2020-09-30 | End: 2020-09-30 | Stop reason: HOSPADM

## 2020-09-30 RX ORDER — PROCHLORPERAZINE MALEATE 10 MG
10 TABLET ORAL EVERY 6 HOURS PRN
Status: DISCONTINUED | OUTPATIENT
Start: 2020-09-30 | End: 2020-09-30

## 2020-09-30 RX ORDER — METOCLOPRAMIDE HYDROCHLORIDE 5 MG/ML
10 INJECTION INTRAMUSCULAR; INTRAVENOUS
Status: DISCONTINUED | OUTPATIENT
Start: 2020-09-30 | End: 2020-09-30 | Stop reason: HOSPADM

## 2020-09-30 RX ORDER — SODIUM CHLORIDE 9 MG/ML
INJECTION, SOLUTION INTRAVENOUS CONTINUOUS PRN
Status: DISCONTINUED | OUTPATIENT
Start: 2020-09-30 | End: 2020-09-30 | Stop reason: SDUPTHER

## 2020-09-30 RX ORDER — OXYCODONE HYDROCHLORIDE AND ACETAMINOPHEN 5; 325 MG/1; MG/1
1 TABLET ORAL EVERY 4 HOURS PRN
Status: DISCONTINUED | OUTPATIENT
Start: 2020-09-30 | End: 2020-10-05 | Stop reason: HOSPADM

## 2020-09-30 RX ORDER — LIDOCAINE HCL/PF 100 MG/5ML
SYRINGE (ML) INJECTION PRN
Status: DISCONTINUED | OUTPATIENT
Start: 2020-09-30 | End: 2020-09-30 | Stop reason: SDUPTHER

## 2020-09-30 RX ORDER — FENTANYL CITRATE 50 UG/ML
INJECTION, SOLUTION INTRAMUSCULAR; INTRAVENOUS PRN
Status: DISCONTINUED | OUTPATIENT
Start: 2020-09-30 | End: 2020-09-30 | Stop reason: SDUPTHER

## 2020-09-30 RX ORDER — OXYCODONE HYDROCHLORIDE AND ACETAMINOPHEN 5; 325 MG/1; MG/1
2 TABLET ORAL EVERY 4 HOURS PRN
Status: DISCONTINUED | OUTPATIENT
Start: 2020-09-30 | End: 2020-10-05 | Stop reason: HOSPADM

## 2020-09-30 RX ORDER — PROMETHAZINE HYDROCHLORIDE 25 MG/1
25 TABLET ORAL EVERY 6 HOURS PRN
Status: DISCONTINUED | OUTPATIENT
Start: 2020-09-30 | End: 2020-09-30

## 2020-09-30 RX ORDER — PROMETHAZINE HYDROCHLORIDE 25 MG/1
12.5 TABLET ORAL EVERY 6 HOURS PRN
Status: DISCONTINUED | OUTPATIENT
Start: 2020-09-30 | End: 2020-09-30

## 2020-09-30 RX ORDER — FENTANYL CITRATE 50 UG/ML
INJECTION, SOLUTION INTRAMUSCULAR; INTRAVENOUS
Status: DISCONTINUED
Start: 2020-09-30 | End: 2020-10-01

## 2020-09-30 RX ORDER — LABETALOL 20 MG/4 ML (5 MG/ML) INTRAVENOUS SYRINGE
5 EVERY 10 MIN PRN
Status: DISCONTINUED | OUTPATIENT
Start: 2020-09-30 | End: 2020-09-30 | Stop reason: HOSPADM

## 2020-09-30 RX ORDER — ACETAMINOPHEN 325 MG/1
650 TABLET ORAL EVERY 4 HOURS PRN
Status: DISCONTINUED | OUTPATIENT
Start: 2020-09-30 | End: 2020-10-05 | Stop reason: HOSPADM

## 2020-09-30 RX ORDER — DEXTROSE MONOHYDRATE 50 MG/ML
100 INJECTION, SOLUTION INTRAVENOUS PRN
Status: DISCONTINUED | OUTPATIENT
Start: 2020-09-30 | End: 2020-10-05 | Stop reason: HOSPADM

## 2020-09-30 RX ORDER — MIDAZOLAM HYDROCHLORIDE 1 MG/ML
INJECTION INTRAMUSCULAR; INTRAVENOUS PRN
Status: DISCONTINUED | OUTPATIENT
Start: 2020-09-30 | End: 2020-09-30 | Stop reason: SDUPTHER

## 2020-09-30 RX ORDER — PROMETHAZINE HYDROCHLORIDE 25 MG/1
12.5 TABLET ORAL EVERY 6 HOURS PRN
Status: DISCONTINUED | OUTPATIENT
Start: 2020-09-30 | End: 2020-10-05 | Stop reason: HOSPADM

## 2020-09-30 RX ORDER — SUCCINYLCHOLINE/SOD CL,ISO/PF 200MG/10ML
SYRINGE (ML) INTRAVENOUS PRN
Status: DISCONTINUED | OUTPATIENT
Start: 2020-09-30 | End: 2020-09-30 | Stop reason: SDUPTHER

## 2020-09-30 RX ORDER — BUPIVACAINE HYDROCHLORIDE 5 MG/ML
INJECTION, SOLUTION PERINEURAL PRN
Status: DISCONTINUED | OUTPATIENT
Start: 2020-09-30 | End: 2020-09-30 | Stop reason: HOSPADM

## 2020-09-30 RX ORDER — SODIUM CHLORIDE 0.9 % (FLUSH) 0.9 %
10 SYRINGE (ML) INJECTION PRN
Status: DISCONTINUED | OUTPATIENT
Start: 2020-09-30 | End: 2020-09-30 | Stop reason: HOSPADM

## 2020-09-30 RX ORDER — FENTANYL CITRATE 50 UG/ML
50 INJECTION, SOLUTION INTRAMUSCULAR; INTRAVENOUS EVERY 5 MIN PRN
Status: DISCONTINUED | OUTPATIENT
Start: 2020-09-30 | End: 2020-09-30 | Stop reason: HOSPADM

## 2020-09-30 RX ORDER — SODIUM CHLORIDE 0.9 % (FLUSH) 0.9 %
10 SYRINGE (ML) INJECTION EVERY 12 HOURS SCHEDULED
Status: DISCONTINUED | OUTPATIENT
Start: 2020-09-30 | End: 2020-09-30 | Stop reason: HOSPADM

## 2020-09-30 RX ORDER — ONDANSETRON 2 MG/ML
INJECTION INTRAMUSCULAR; INTRAVENOUS PRN
Status: DISCONTINUED | OUTPATIENT
Start: 2020-09-30 | End: 2020-09-30 | Stop reason: SDUPTHER

## 2020-09-30 RX ORDER — PROPOFOL 10 MG/ML
INJECTION, EMULSION INTRAVENOUS PRN
Status: DISCONTINUED | OUTPATIENT
Start: 2020-09-30 | End: 2020-09-30 | Stop reason: SDUPTHER

## 2020-09-30 RX ORDER — ACETAMINOPHEN 325 MG/1
650 TABLET ORAL EVERY 4 HOURS PRN
Status: DISCONTINUED | OUTPATIENT
Start: 2020-09-30 | End: 2020-09-30 | Stop reason: SDUPTHER

## 2020-09-30 RX ORDER — ONDANSETRON 2 MG/ML
4 INJECTION INTRAMUSCULAR; INTRAVENOUS EVERY 6 HOURS PRN
Status: DISCONTINUED | OUTPATIENT
Start: 2020-09-30 | End: 2020-09-30 | Stop reason: SDUPTHER

## 2020-09-30 RX ORDER — POTASSIUM CHLORIDE AND SODIUM CHLORIDE 450; 150 MG/100ML; MG/100ML
INJECTION, SOLUTION INTRAVENOUS CONTINUOUS
Status: DISCONTINUED | OUTPATIENT
Start: 2020-09-30 | End: 2020-10-04

## 2020-09-30 RX ADMIN — ONDANSETRON HYDROCHLORIDE 4 MG: 4 INJECTION, SOLUTION INTRAMUSCULAR; INTRAVENOUS at 09:28

## 2020-09-30 RX ADMIN — ROCURONIUM BROMIDE 50 MG: 10 INJECTION INTRAVENOUS at 11:02

## 2020-09-30 RX ADMIN — POTASSIUM CHLORIDE AND SODIUM CHLORIDE: 450; 150 INJECTION, SOLUTION INTRAVENOUS at 17:27

## 2020-09-30 RX ADMIN — Medication 3 G: at 09:20

## 2020-09-30 RX ADMIN — OXYCODONE HYDROCHLORIDE AND ACETAMINOPHEN 1 TABLET: 5; 325 TABLET ORAL at 23:48

## 2020-09-30 RX ADMIN — ROCURONIUM BROMIDE 30 MG: 10 INJECTION INTRAVENOUS at 12:58

## 2020-09-30 RX ADMIN — DEXAMETHASONE SODIUM PHOSPHATE 12 MG: 4 INJECTION, SOLUTION INTRAMUSCULAR; INTRAVENOUS at 09:28

## 2020-09-30 RX ADMIN — FENTANYL CITRATE 100 MCG: 50 INJECTION, SOLUTION INTRAMUSCULAR; INTRAVENOUS at 09:27

## 2020-09-30 RX ADMIN — SODIUM CHLORIDE: 9 INJECTION, SOLUTION INTRAVENOUS at 09:11

## 2020-09-30 RX ADMIN — ROCURONIUM BROMIDE 5 MG: 10 INJECTION INTRAVENOUS at 08:07

## 2020-09-30 RX ADMIN — TRAZODONE HYDROCHLORIDE 50 MG: 50 TABLET ORAL at 21:27

## 2020-09-30 RX ADMIN — SODIUM CHLORIDE: 9 INJECTION, SOLUTION INTRAVENOUS at 07:42

## 2020-09-30 RX ADMIN — Medication 200 MG: at 08:07

## 2020-09-30 RX ADMIN — PROPOFOL 200 MG: 10 INJECTION, EMULSION INTRAVENOUS at 08:07

## 2020-09-30 RX ADMIN — FENTANYL CITRATE 50 MCG: 50 INJECTION, SOLUTION INTRAMUSCULAR; INTRAVENOUS at 15:24

## 2020-09-30 RX ADMIN — Medication 3 G: at 13:21

## 2020-09-30 RX ADMIN — Medication 8 ML/HR: at 11:45

## 2020-09-30 RX ADMIN — SODIUM CHLORIDE: 9 INJECTION, SOLUTION INTRAVENOUS at 10:35

## 2020-09-30 RX ADMIN — FENTANYL CITRATE 100 MCG: 50 INJECTION, SOLUTION INTRAMUSCULAR; INTRAVENOUS at 08:05

## 2020-09-30 RX ADMIN — ROCURONIUM BROMIDE 50 MG: 10 INJECTION INTRAVENOUS at 09:35

## 2020-09-30 RX ADMIN — MIDAZOLAM HYDROCHLORIDE 2 MG: 1 INJECTION, SOLUTION INTRAMUSCULAR; INTRAVENOUS at 07:47

## 2020-09-30 RX ADMIN — FENTANYL CITRATE 100 MCG: 50 INJECTION, SOLUTION INTRAMUSCULAR; INTRAVENOUS at 12:21

## 2020-09-30 RX ADMIN — Medication 10 ML: at 21:26

## 2020-09-30 RX ADMIN — Medication 200 MG: at 08:17

## 2020-09-30 RX ADMIN — Medication 5 ML: at 08:07

## 2020-09-30 RX ADMIN — ROCURONIUM BROMIDE 50 MG: 10 INJECTION INTRAVENOUS at 11:49

## 2020-09-30 RX ADMIN — FENTANYL CITRATE 100 MCG: 50 INJECTION, SOLUTION INTRAMUSCULAR; INTRAVENOUS at 09:23

## 2020-09-30 RX ADMIN — SUGAMMADEX 200 MG: 100 INJECTION, SOLUTION INTRAVENOUS at 14:32

## 2020-09-30 RX ADMIN — ROCURONIUM BROMIDE 45 MG: 10 INJECTION INTRAVENOUS at 08:17

## 2020-09-30 RX ADMIN — ROPIVACAINE HYDROCHLORIDE 6 ML: 2 INJECTION, SOLUTION EPIDURAL; INFILTRATION at 09:23

## 2020-09-30 RX ADMIN — PROPOFOL 50 MG: 10 INJECTION, EMULSION INTRAVENOUS at 09:15

## 2020-09-30 RX ADMIN — Medication 8 ML/HR: at 16:00

## 2020-09-30 RX ADMIN — FENTANYL CITRATE 100 MCG: 50 INJECTION, SOLUTION INTRAMUSCULAR; INTRAVENOUS at 09:51

## 2020-09-30 RX ADMIN — ROCURONIUM BROMIDE 50 MG: 10 INJECTION INTRAVENOUS at 09:10

## 2020-09-30 ASSESSMENT — PULMONARY FUNCTION TESTS
PIF_VALUE: 27
PIF_VALUE: 29
PIF_VALUE: 0
PIF_VALUE: 28
PIF_VALUE: 31
PIF_VALUE: 4
PIF_VALUE: 30
PIF_VALUE: 34
PIF_VALUE: 5
PIF_VALUE: 33
PIF_VALUE: 35
PIF_VALUE: 29
PIF_VALUE: 2
PIF_VALUE: 18
PIF_VALUE: 5
PIF_VALUE: 26
PIF_VALUE: 29
PIF_VALUE: 31
PIF_VALUE: 29
PIF_VALUE: 19
PIF_VALUE: 35
PIF_VALUE: 29
PIF_VALUE: 31
PIF_VALUE: 29
PIF_VALUE: 29
PIF_VALUE: 0
PIF_VALUE: 29
PIF_VALUE: 29
PIF_VALUE: 21
PIF_VALUE: 29
PIF_VALUE: 40
PIF_VALUE: 19
PIF_VALUE: 21
PIF_VALUE: 27
PIF_VALUE: 21
PIF_VALUE: 0
PIF_VALUE: 20
PIF_VALUE: 18
PIF_VALUE: 31
PIF_VALUE: 31
PIF_VALUE: 27
PIF_VALUE: 0
PIF_VALUE: 1
PIF_VALUE: 29
PIF_VALUE: 33
PIF_VALUE: 18
PIF_VALUE: 21
PIF_VALUE: 29
PIF_VALUE: 19
PIF_VALUE: 24
PIF_VALUE: 31
PIF_VALUE: 29
PIF_VALUE: 33
PIF_VALUE: 19
PIF_VALUE: 27
PIF_VALUE: 20
PIF_VALUE: 20
PIF_VALUE: 19
PIF_VALUE: 29
PIF_VALUE: 29
PIF_VALUE: 20
PIF_VALUE: 29
PIF_VALUE: 21
PIF_VALUE: 29
PIF_VALUE: 29
PIF_VALUE: 35
PIF_VALUE: 35
PIF_VALUE: 31
PIF_VALUE: 18
PIF_VALUE: 31
PIF_VALUE: 26
PIF_VALUE: 18
PIF_VALUE: 29
PIF_VALUE: 29
PIF_VALUE: 31
PIF_VALUE: 19
PIF_VALUE: 18
PIF_VALUE: 29
PIF_VALUE: 17
PIF_VALUE: 19
PIF_VALUE: 21
PIF_VALUE: 29
PIF_VALUE: 29
PIF_VALUE: 31
PIF_VALUE: 0
PIF_VALUE: 19
PIF_VALUE: 18
PIF_VALUE: 20
PIF_VALUE: 30
PIF_VALUE: 29
PIF_VALUE: 33
PIF_VALUE: 29
PIF_VALUE: 31
PIF_VALUE: 19
PIF_VALUE: 17
PIF_VALUE: 29
PIF_VALUE: 27
PIF_VALUE: 29
PIF_VALUE: 30
PIF_VALUE: 32
PIF_VALUE: 19
PIF_VALUE: 29
PIF_VALUE: 1
PIF_VALUE: 29
PIF_VALUE: 22
PIF_VALUE: 29
PIF_VALUE: 26
PIF_VALUE: 0
PIF_VALUE: 29
PIF_VALUE: 3
PIF_VALUE: 31
PIF_VALUE: 29
PIF_VALUE: 24
PIF_VALUE: 0
PIF_VALUE: 19
PIF_VALUE: 29
PIF_VALUE: 31
PIF_VALUE: 29
PIF_VALUE: 20
PIF_VALUE: 29
PIF_VALUE: 27
PIF_VALUE: 22
PIF_VALUE: 29
PIF_VALUE: 19
PIF_VALUE: 28
PIF_VALUE: 29
PIF_VALUE: 19
PIF_VALUE: 29
PIF_VALUE: 22
PIF_VALUE: 1
PIF_VALUE: 1
PIF_VALUE: 23
PIF_VALUE: 25
PIF_VALUE: 29
PIF_VALUE: 19
PIF_VALUE: 29
PIF_VALUE: 1
PIF_VALUE: 35
PIF_VALUE: 22
PIF_VALUE: 20
PIF_VALUE: 18
PIF_VALUE: 29
PIF_VALUE: 19
PIF_VALUE: 29
PIF_VALUE: 3
PIF_VALUE: 29
PIF_VALUE: 29
PIF_VALUE: 0
PIF_VALUE: 18
PIF_VALUE: 29
PIF_VALUE: 29
PIF_VALUE: 26
PIF_VALUE: 18
PIF_VALUE: 20
PIF_VALUE: 30
PIF_VALUE: 19
PIF_VALUE: 29
PIF_VALUE: 23
PIF_VALUE: 0
PIF_VALUE: 29
PIF_VALUE: 18
PIF_VALUE: 33
PIF_VALUE: 35
PIF_VALUE: 29
PIF_VALUE: 28
PIF_VALUE: 9
PIF_VALUE: 28
PIF_VALUE: 29
PIF_VALUE: 24
PIF_VALUE: 22
PIF_VALUE: 29
PIF_VALUE: 2
PIF_VALUE: 31
PIF_VALUE: 20
PIF_VALUE: 29
PIF_VALUE: 26
PIF_VALUE: 29
PIF_VALUE: 16
PIF_VALUE: 26
PIF_VALUE: 29
PIF_VALUE: 31
PIF_VALUE: 31
PIF_VALUE: 29
PIF_VALUE: 1
PIF_VALUE: 21
PIF_VALUE: 19
PIF_VALUE: 0
PIF_VALUE: 28
PIF_VALUE: 28
PIF_VALUE: 0
PIF_VALUE: 21
PIF_VALUE: 29
PIF_VALUE: 2
PIF_VALUE: 29
PIF_VALUE: 29
PIF_VALUE: 8
PIF_VALUE: 27
PIF_VALUE: 27
PIF_VALUE: 29
PIF_VALUE: 29
PIF_VALUE: 28
PIF_VALUE: 31
PIF_VALUE: 29
PIF_VALUE: 15
PIF_VALUE: 18
PIF_VALUE: 21
PIF_VALUE: 4
PIF_VALUE: 32
PIF_VALUE: 20
PIF_VALUE: 11
PIF_VALUE: 22
PIF_VALUE: 29
PIF_VALUE: 30
PIF_VALUE: 1
PIF_VALUE: 29
PIF_VALUE: 28
PIF_VALUE: 29
PIF_VALUE: 1
PIF_VALUE: 19
PIF_VALUE: 18
PIF_VALUE: 29
PIF_VALUE: 3
PIF_VALUE: 29
PIF_VALUE: 29
PIF_VALUE: 28
PIF_VALUE: 29
PIF_VALUE: 17
PIF_VALUE: 0
PIF_VALUE: 25
PIF_VALUE: 27
PIF_VALUE: 29
PIF_VALUE: 35
PIF_VALUE: 22
PIF_VALUE: 4
PIF_VALUE: 30
PIF_VALUE: 29
PIF_VALUE: 27
PIF_VALUE: 22
PIF_VALUE: 29
PIF_VALUE: 5
PIF_VALUE: 20
PIF_VALUE: 29
PIF_VALUE: 29
PIF_VALUE: 28
PIF_VALUE: 28
PIF_VALUE: 19
PIF_VALUE: 29
PIF_VALUE: 3
PIF_VALUE: 29
PIF_VALUE: 18
PIF_VALUE: 19
PIF_VALUE: 0
PIF_VALUE: 0
PIF_VALUE: 29
PIF_VALUE: 35
PIF_VALUE: 29
PIF_VALUE: 29
PIF_VALUE: 18
PIF_VALUE: 30
PIF_VALUE: 0
PIF_VALUE: 3
PIF_VALUE: 27
PIF_VALUE: 19
PIF_VALUE: 24
PIF_VALUE: 29
PIF_VALUE: 27
PIF_VALUE: 29
PIF_VALUE: 29
PIF_VALUE: 27
PIF_VALUE: 29
PIF_VALUE: 29
PIF_VALUE: 19
PIF_VALUE: 18
PIF_VALUE: 29
PIF_VALUE: 33
PIF_VALUE: 35
PIF_VALUE: 29
PIF_VALUE: 28
PIF_VALUE: 0
PIF_VALUE: 30
PIF_VALUE: 0
PIF_VALUE: 29
PIF_VALUE: 29
PIF_VALUE: 1
PIF_VALUE: 23
PIF_VALUE: 18
PIF_VALUE: 29
PIF_VALUE: 35
PIF_VALUE: 30
PIF_VALUE: 4
PIF_VALUE: 29
PIF_VALUE: 0
PIF_VALUE: 27
PIF_VALUE: 29
PIF_VALUE: 0
PIF_VALUE: 28
PIF_VALUE: 19
PIF_VALUE: 29
PIF_VALUE: 7
PIF_VALUE: 29
PIF_VALUE: 33
PIF_VALUE: 27
PIF_VALUE: 29
PIF_VALUE: 27
PIF_VALUE: 0
PIF_VALUE: 32
PIF_VALUE: 29
PIF_VALUE: 34
PIF_VALUE: 31
PIF_VALUE: 29
PIF_VALUE: 19
PIF_VALUE: 29
PIF_VALUE: 0
PIF_VALUE: 29
PIF_VALUE: 21
PIF_VALUE: 20
PIF_VALUE: 29
PIF_VALUE: 27
PIF_VALUE: 0
PIF_VALUE: 19
PIF_VALUE: 29
PIF_VALUE: 29
PIF_VALUE: 25
PIF_VALUE: 29
PIF_VALUE: 27
PIF_VALUE: 28
PIF_VALUE: 28
PIF_VALUE: 29
PIF_VALUE: 18
PIF_VALUE: 7
PIF_VALUE: 33
PIF_VALUE: 19
PIF_VALUE: 34
PIF_VALUE: 19
PIF_VALUE: 29
PIF_VALUE: 28
PIF_VALUE: 30
PIF_VALUE: 21
PIF_VALUE: 18
PIF_VALUE: 21
PIF_VALUE: 18
PIF_VALUE: 35
PIF_VALUE: 1
PIF_VALUE: 29
PIF_VALUE: 28
PIF_VALUE: 29
PIF_VALUE: 19
PIF_VALUE: 32
PIF_VALUE: 22
PIF_VALUE: 32
PIF_VALUE: 29
PIF_VALUE: 29
PIF_VALUE: 35
PIF_VALUE: 29
PIF_VALUE: 27
PIF_VALUE: 29
PIF_VALUE: 2
PIF_VALUE: 35
PIF_VALUE: 29
PIF_VALUE: 18
PIF_VALUE: 31
PIF_VALUE: 29
PIF_VALUE: 21
PIF_VALUE: 29
PIF_VALUE: 18
PIF_VALUE: 28
PIF_VALUE: 29
PIF_VALUE: 23
PIF_VALUE: 29
PIF_VALUE: 29
PIF_VALUE: 23
PIF_VALUE: 19
PIF_VALUE: 27
PIF_VALUE: 19
PIF_VALUE: 29
PIF_VALUE: 29
PIF_VALUE: 2
PIF_VALUE: 30
PIF_VALUE: 19
PIF_VALUE: 27
PIF_VALUE: 1
PIF_VALUE: 29
PIF_VALUE: 29
PIF_VALUE: 32
PIF_VALUE: 29
PIF_VALUE: 21
PIF_VALUE: 33

## 2020-09-30 ASSESSMENT — PAIN DESCRIPTION - PAIN TYPE
TYPE: SURGICAL PAIN
TYPE: ACUTE PAIN;SURGICAL PAIN

## 2020-09-30 ASSESSMENT — PAIN DESCRIPTION - ORIENTATION
ORIENTATION: RIGHT
ORIENTATION: RIGHT

## 2020-09-30 ASSESSMENT — PAIN DESCRIPTION - LOCATION
LOCATION: BACK
LOCATION: CHEST

## 2020-09-30 ASSESSMENT — PAIN SCALES - GENERAL
PAINLEVEL_OUTOF10: 5
PAINLEVEL_OUTOF10: 0
PAINLEVEL_OUTOF10: 3
PAINLEVEL_OUTOF10: 4
PAINLEVEL_OUTOF10: 5

## 2020-09-30 ASSESSMENT — PAIN DESCRIPTION - DESCRIPTORS
DESCRIPTORS: ACHING
DESCRIPTORS: DULL;DISCOMFORT

## 2020-09-30 ASSESSMENT — PAIN - FUNCTIONAL ASSESSMENT
PAIN_FUNCTIONAL_ASSESSMENT: PREVENTS OR INTERFERES SOME ACTIVE ACTIVITIES AND ADLS
PAIN_FUNCTIONAL_ASSESSMENT: 0-10

## 2020-09-30 ASSESSMENT — PAIN DESCRIPTION - FREQUENCY
FREQUENCY: CONTINUOUS
FREQUENCY: CONTINUOUS

## 2020-09-30 ASSESSMENT — PAIN DESCRIPTION - ONSET
ONSET: ON-GOING
ONSET: ON-GOING

## 2020-09-30 NOTE — ANESTHESIA PRE PROCEDURE
([facility] D70.1, T45.1X5A    Chronic nausea R11.0    Anemia D64.9    Pulmonary nodule R91.1    Adenocarcinoma, lung, right (HCC) C34.91    Nodule of upper lobe of right lung R91.1    History of lung cancer Z85.118       Past Medical History:        Diagnosis Date    Acid reflux     CAD (coronary artery disease)     Cancer (HCC)     9/2020 lung    Ejection fraction < 50%     45 per patient    Non Hodgkin's lymphoma ([facility]     Osteoarthritis     Tooth ache 09/28/2020    Type 2 diabetes mellitus without complication ([facility]        Past Surgical History:        Procedure Laterality Date    COLONOSCOPY      CT NEEDLE BIOPSY LUNG PERCUTANEOUS  8/4/2020    CT NEEDLE BIOPSY LUNG PERCUTANEOUS 8/4/2020 [facility] CT SCAN    DILATATION, ESOPHAGUS      LYMPH NODE BIOPSY      groin    TUNNELED VENOUS PORT PLACEMENT         Social History:    Social History     Tobacco Use    Smoking status: Never Smoker    Smokeless tobacco: Never Used   Substance Use Topics    Alcohol use: Not Currently                                Counseling given: Not Answered      Vital Signs (Current):   Vitals:    09/30/20 0553   BP: (!) 137/92   Pulse: 89   Resp: 16   Temp: 96.7 °F (35.9 °C)   TempSrc: Temporal   SpO2: 96%   Weight: (!) 334 lb 12.8 oz (151.9 kg)   Height: 6' (1.829 m)                                              BP Readings from Last 3 Encounters:   09/30/20 (!) 137/92   09/28/20 (!) 147/93   09/22/20 135/87       NPO Status: Time of last liquid consumption: 2100                        Time of last solid consumption: 2100                        Date of last liquid consumption: 09/29/20                        Date of last solid food consumption: 09/29/20    BMI:   Wt Readings from Last 3 Encounters:   09/30/20 (!) 334 lb 12.8 oz (151.9 kg)   09/28/20 (!) 332 lb 14.3 oz (151 kg)   09/22/20 (!) 332 lb 6.4 oz (150.8 kg)     Body mass index is 45.41 kg/m².     CBC:   Lab Results   Component Value Date    WBC 5.0 09/30/2020    RBC 5.20 09/30/2020    HGB 15.5 09/30/2020    HCT 46.7 09/30/2020    MCV 89.8 09/30/2020    RDW 13.1 08/11/2020     09/30/2020       CMP:   Lab Results   Component Value Date     09/30/2020    K 4.1 09/30/2020     09/30/2020    CO2 23 09/30/2020    BUN 17 09/30/2020    CREATININE 0.9 09/30/2020    LABGLOM 85 09/30/2020    GLUCOSE 185 09/30/2020    PROT 6.4 07/21/2020    CALCIUM 9.9 09/30/2020    BILITOT 0.4 07/21/2020    ALKPHOS 52 07/21/2020    AST 34 07/21/2020    ALT 35 07/21/2020       POC Tests: No results for input(s): POCGLU, POCNA, POCK, POCCL, POCBUN, POCHEMO, POCHCT in the last 72 hours. Coags:   Lab Results   Component Value Date    INR 1.02 09/30/2020    APTT 29.9 09/30/2020       HCG (If Applicable): No results found for: PREGTESTUR, PREGSERUM, HCG, HCGQUANT     ABGs: No results found for: PHART, PO2ART, MJM0GPC, WWR7VJF, BEART, J2TZCZBM     Type & Screen (If Applicable):  Lab Results   Component Value Date    LABRH POS 09/30/2020       Drug/Infectious Status (If Applicable):  No results found for: HIV, HEPCAB    COVID-19 Screening (If Applicable): No results found for: COVID19      Anesthesia Evaluation  Patient summary reviewed and Nursing notes reviewed no history of anesthetic complications:   Airway: Mallampati: II  TM distance: >3 FB   Neck ROM: full  Mouth opening: > = 3 FB Dental:          Pulmonary:                              Cardiovascular:    (+) angina: with exertion, CAD:,                   Neuro/Psych:               GI/Hepatic/Renal:   (+) morbid obesity          Endo/Other:    (+) malignancy/cancer. Abdominal:   (+) obese,         Vascular:                                        Anesthesia Plan      general and epidural     ASA 4       Induction: intravenous. arterial line  MIPS: Postoperative opioids intended and Prophylactic antiemetics administered. Anesthetic plan and risks discussed with patient.       Plan discussed with CRNA and surgical team.                  Michael Hunt MD   9/30/2020

## 2020-09-30 NOTE — H&P
CT/CV Surgery H&P      Patient's Name/Date of Birth: Edith Platt / 1960 (56 y.o.)    PCP: Juany Monzon MD, MD    Date: September 30, 2020     56y.o. year old male with a history of non-Hodgkin's lymphoma, status post 6 cycles of chemotherapy with the last treatment finished in late May 2020, who was found to have a adenocarcinoma of right upper lobe of lung. He returned to [facility] thoracic surgery clinic for follow-up. His preoperative work-up shows the following. Left ventricle ejection fraction approximately 45%. Nuclear stress test negative for ischemia. Pulmonary function testing; FEV1 is 3.8 L, 105% of predicted value. PET scan shows elevated SUV in the right upper lobe lung lesion without evidence of distant metastasis. He reports that he lost approximately 20 to 30 pounds. He denied any fever, chill, productive cough, midsternal chest pain, or right-sided pleuritic type of chest pain. PastMedical History:  Nabil  has a past medical history of Acid reflux, CAD (coronary artery disease), Cancer ([address] Ejection fraction < 50%, Non Hodgkin's lymphoma ([facility] Osteoarthritis, Tooth ache, and Type 2 diabetes mellitus without complication ([facility] Past Surgical History:  The patient  has a past surgical history that includes Colonoscopy; Dilatation, esophagus; Tunneled venous port placement; CT NEEDLE BIOPSY LUNG PERCUTANEOUS (8/4/2020); and lymph node biopsy. Allergies: The patient has No Known Allergies. Medications:  No current outpatient medications on file. Family History: This patient's family history includes Diabetes in his brother and sister; Heart Disease in his father; No Known Problems in his mother; Stroke in his father. Social History:  Nabil  reports that he has never smoked. He has never used smokeless tobacco. He reports previous alcohol use. He reports current drug use. Drug: Marijuana.     Vital Signs:   BP (!) 137/92   Pulse 89   Temp 96.7 °F (35.9 °C) (Temporal)   Resp 16   Ht 6' (1.829 m)   Wt (!) 334 lb 12.8 oz (151.9 kg)   SpO2 96%   BMI 45.41 kg/m²     ROS:   Constitutional: Negative for activity change, chills, fatigue, fever and unexpected weight change. Respiratory: Negative for apnea, shortness of breath, wheezing and stridor. Cardiovascular: Negative for chest pain, palpitations and leg swelling. Gastrointestinal: Negative for hematochezia, melana, constipation, and N/V/D. Musculoskeletal: Negative for myalgias  Skin: Negative for color change, rash and wound. Neurological: Negative for dizziness or syncope. Physical Exam:  General appearance:  No acute distress, appears stated age and cooperative. Neck: No jugular venous distention. Trachea midline. No carotid bruits. Respiratory:  Normal respiratory effort. Clear to auscultation, bilaterally without Rales/Wheezes/Rhonch. Cardiovascular:  Regular rate and rhythm with normal S1/S2 without murmurs, rubs or gallops. Abdomen: Soft, non-tender, non-distended with normal bowel sounds. Ext: No clubbing, cyanosis or edema bilaterally. Full range of motion without deformity. Skin: Skin color, texture, turgor normal.  No rashes or lesions. Neurologic:  Neurovascularly intact without any focal sensory/motor deficits. Psychiatric:  Alert and oriented, thought content appropriate, normal insight. Capillary Refill: Brisk,< 3 seconds   Peripheral Pulses: +2 palpable, equal bilaterally     Labs:    CBC:  Lab Results   Component Value Date    WBC 5.0 09/30/2020    HGB 15.5 09/30/2020    HCT 46.7 09/30/2020    MCV 89.8 09/30/2020     09/30/2020    INR 1.02 09/30/2020     BMP:   Lab Results   Component Value Date     09/30/2020    K 4.1 09/30/2020     09/30/2020    CO2 23 09/30/2020    BUN 17 09/30/2020    CREATININE 0.9 09/30/2020    MG 2.1 11/05/2019       Imaging I have reviewed all preop work-up.   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 In recent chemotherapy for non-Hodgkin's lymphoma. Tumor clinical staging     Primary lung adenocarcinoma, right upper lobe.      Tumor size: 25 mm x 15 mm  cT1c     No mediastinal lymph node involvement by PET. cN0     No evidence of distance metastasis. cM0     Stage I A 3                Plan 9/21/20:  1) Bronch, epidural, and RU lobectomy    Thank you for allowing us to be involved in the patient's care.       NO CHANGES IN H AND P, ROS, and PE    Electronically by Grabiel Woods PA-C  on 9/30/2020 at 8:00 AM

## 2020-09-30 NOTE — PROGRESS NOTES
Pt oriented to SDS 19 and unit. Pt verbalized approval for first name, last initial and physician name on unit whiteboard. Fall band applied. Vaccine information given. Plan of care reviewed.

## 2020-09-30 NOTE — OP NOTE
Operative Note      Patient: Ta Dawkins  YOB: 1960  MRN: [id number]    Date of Procedure: 9/30/2020    Pre-Op Diagnosis: Lung cancer, right upper lobe, adenocarcinoma. Post-Op Diagnosis: Lung cancer, right upper lobe, adenocarcinoma. Procedure(s):  BRONCHOSCOPY, EPIDURAL CATHETER, RIGHT THORACOTOMY RIGHT UPPER LOBECTOMY and lymph node dissection    Surgeon(s):  Meagan Head MD    Assistant:   Physician Assistant: ROBERT Kramer  Because of complexity of the procedure, two physician assistants scrubbed during the procedure. Anesthesia: General, an epidural catheter insertion. Estimated Blood Loss (mL): 100 cc. Complications: None. Specimens:   ID Type Source Tests Collected by Time Destination   A : right lung sub carinal lymph node - station 7 Tissue Lung SURGICAL PATHOLOGY Meagan Head MD 9/30/2020 1007    B : R 4 lymph Node Tissue Lung SURGICAL PATHOLOGY Meagan Head MD 9/30/2020 1023    C : Right Wedge Resection of right upper lobe Tissue Lung SURGICAL PATHOLOGY Meagan Head MD 9/30/2020 1043    D : Anterior mediastinal lymph node over Superior pulmonary vein Tissue Lung SURGICAL PATHOLOGY Meagan Head MD 9/30/2020 1049    E : Right 10 Hilar Lymph Node Tissue Lung SURGICAL PATHOLOGY Meagan Head MD 9/30/2020 1127    F : Right 10 [address] Lymph Node #2 Tissue Lung SURGICAL PATHOLOGY Meagan Head MD 9/30/2020 1134    G : N1 lymph node of pulmanary artery over lower lobe Tissue Lung SURGICAL PATHOLOGY Meagan Head MD 9/30/2020 1205    H : N1 lymph node around middle bronchus Tissue Lung SURGICAL PATHOLOGY Meagan Head MD 9/30/2020 1244    I : Right Upper and middle lobe Tissue Lung SURGICAL PATHOLOGY Meagan Head MD 9/30/2020 1300    J : Inferior pulmonary ligament lymph node Tissue Lung SURGICAL PATHOLOGY Meagan Head MD 9/30/2020 1309        Implants: None.       Drains:   Chest Tube 1 Right 32 Danish (Active)   Suction -20 cm H2O 09/30/20 1451   Drainage Description Serosanguinous 09/30/20 1451   Dressing Status Intact 09/30/20 1451   Output (ml) 30 ml 09/30/20 1630       Chest Tube 2 Right 32 German (Active)   Suction -20 cm H2O 09/30/20 1451   Drainage Description Sanguinous 09/30/20 1451   Dressing Status Intact 09/30/20 1451   Output (ml) 70 ml 09/30/20 1630       Urethral Catheter Non-latex;Straight-tip 16 fr (Active)   Site Assessment Pink 09/30/20 1449   Urine Color Yellow 09/30/20 1449   Urine Appearance Clear 09/30/20 1449       Findings: Lung cancer located in right upper lobe anterior segment. Approximately 3 x 3 x 3 cm in diameter. No pleural effusion. No adhesion in the pleural space. The tumor did not appear to penetrate the visceral pleura. Detailed Description of Procedure: The patient was taken to the operating room and was placed in a supine position. After a routine preparation and general endotracheal anesthesia, a flexible bronchoscopy was performed. No intra-bronchial lesion was noted in the right upper lobe bronchus. The rest of examination was unremarkable. The single lumen endotracheal tube was exchanged with a double-lumen endotracheal tube. A timeout was called and all necessary measures were taken. He was placed on a left lateral decubitus position with the right chest exposed. The right chest was painted and draped in a sterile fashion. A standard posterolateral right thoracotomy incision was made. It was carried down to the deeper plane. The latissimus dorsi muscle was severed. The serratus anterior muscle was preserved. The right pleural space was entered through the fifth intercostal space along the upper border of the sixth rib. The sixth rib was severed posteriorly. A chest retractor was applied and the right pleural space was exposed. Complete examination was then followed. There was no evidence of visceral pleural or parietal pleural invasion. No pleural effusion noted.   An adult thumb tip sized mass was palpated in the recovery room in a stable condition. Surgical count was correct. No transfusion was made during this procedure.       Electronically signed by J Carlos Dela Cruz MD on 9/30/2020 at 4:46 PM

## 2020-09-30 NOTE — ANESTHESIA PROCEDURE NOTES
Arterial Line:    An arterial line was placed using surface landmarks, in the OR for the following indication(s): continuous blood pressure monitoring and blood sampling needed. A 20 gauge (size), (length), Arrow (type) catheter was placed, into the left radial artery, secured by Tegaderm and tape. Events:  patient tolerated procedure well with no complications and EBL < 5mL.   9/30/2020 8:22 AM9/30/2020 8:24 AM  Anesthesiologist: Henny Canchola MD  Resident/CRNA: LAUREN Miller - CRNA  Other anesthesia staff: Aftab Mancia RN  Performed: Anesthesiologist   Preanesthetic Checklist  Completed: patient identified, site marked, surgical consent, pre-op evaluation, timeout performed, IV checked, risks and benefits discussed, monitors and equipment checked, anesthesia consent given, oxygen available and patient being monitored

## 2020-09-30 NOTE — PROGRESS NOTES
Patient arrived to unit from PACU via bed. Patient placed on continuous ICU bedside monitor. Patient admitted for History of lung cancer [Z85.118]. Vitals obtained. See flowsheets. Patient's IV access includes Peripheral IV x2. Current infusions and rates of infusion include LR 75 ml/hr. Assessment completed by CARLITOS Nance RN. Two nurse skin assessment completed by CARLITOS Nanec and GÉNESIS Manzanares RN. See flowsheets for assessment details. Policies and procedures of ICU able to be explained to patient at this time. Family member(s)/representative(s) present at time of admission include none. Patient rights explained to family member(s)/representatives and patient, as able. Patient/patient's family member(s)/representative(s) N/A to have physician notified of their admission. All questions posed by patient's family member(s)/representative(s) and patient answered at this time.

## 2020-09-30 NOTE — CONSULTS
CRITICAL CARE CONSULT NOTE      Patient:  Claudia Dunn    Unit/Bed:4D-14/014-A  YOB: 1960  MRN: [id number]   PCP: Amari Valera MD, MD  Date of Admission: 9/30/2020  Chief Complaint:-Chest pain    Assessment and Plan:    1. Acute pulmonary insufficiency following surgery, hypoxia: Patient is currently on 2 L oxygen/nasal cannula, will titrate to maintain SaO2 greater than 92%. 2. Lung cancer, right upper lobe adenocarcinoma: PET scanning shows elevated SUV in the right upper lobe without evidence of distal metastasis. 9/30/2020 s/p bronchoscopy, epidural catheter, right thoracotomy right upper lobectomy and lymph node dissection. Lung cancer located in the right upper lobe anterior segment. No pleural effusion. No adhesion in the pleural space. The tumor did not appear to penetrate the visceral pleura. Pathology is pending. Patient does have 2 large-bore chest tubes to suction. Dr. Jr Garcia,  surgery to follow. 3. DMT2, uncontrolled:  A1c 9/28/2020 6.6, patient did receive dexamethasone intraoperatively likely contributing. Glipizide and Glucophage were not restarted at time of admission, will monitor with ACCU and SSI. 4. Nonobstructive CAD: History of [facility]-2019, preoperative echo completed on 9/ 2020 LVEF 45%. Aspirin is on hold. Patient is complaining of pleuritic right-sided chest pain likely secondary to surgery. Will monitor. 5. HFrEF: Echo 9/2020 LVEF 45%, stable, will continue to monitor. 6. Non-Hodgkin's lymphoma: History, status post 6 cycles of chemotherapy with last treatment in May 2020. PET scan May 2020 no evidence of other malignancy. Established patient of Dr. Cyndi Osuna. We will continue to monitor. 7. GERD: history, monitor  8. Ascending aorta: Noted to be mildly dilated on ECHO 9/2020 will continue to monitor.   9. Morbid Obesity :  BMI 45.41      INITIAL H AND P AND ICU COURSE:  Nabil Mayer is a 41-year-old  male admitted to [address] 9/30/2020 status no drainage  NOSE/THROAT: No rhinorrhea or pharyngitis, no nasal drainage  NECK: No lumps or unusual neck stiffness  PULMONARY: Positive for cough, exertional dyspnea, and chest tubes X2 denies any  orthopnea or paroxysmal nocturnal dyspnea, stridor or wheezing  CARDIAC: Positive for right-sided chest pain with deep inspiration, denies any chest pressure or heaviness. GI: Denies any abdominal pain no history melena or hematochezia, no diarrhea or constipation  : Bartlett present, no hematuria no suprapubic pressure no CVA tenderness  PERIPHERAL VASCULAR: No intermittent claudication or unusual leg cramps  MUSCULOSKELETAL: Occasional arthralgias, myalgias  NEUROLOGICAL: No seizures or Syncope  HEMATOLOGIC:  No unusual bruising or bleeding  PSYCH: Denies any homicidal or suicidal ideations    Scheduled Meds:   sodium chloride flush  10 mL Intravenous 2 times per day    traZODone  50 mg Oral Nightly    fentaNYL        sodium chloride flush  10 mL Intravenous 2 times per day    insulin lispro  0-12 Units Subcutaneous TID WC    insulin lispro  0-6 Units Subcutaneous Nightly     Continuous Infusions:   fentaNYL 750 mcg, ropivacaine 0.1% in sodium chloride 0.9% 265mL 8 mL/hr (09/30/20 1600)    dextrose      0.45 % NaCl with KCl 20 mEq 75 mL/hr at 09/30/20 1727       PHYSICAL EXAMINATION:  T: 98.2.  P: 84. RR: 18. B/P: 146/84. FiO2: 2 L. O2 Sat: 96.  I/O: Pending  Body mass index is 45.41 kg/m². GCS:   15  General:   Morbidly obese, pale, acutely ill  HEENT:  normocephalic and atraumatic. No scleral icterus. PERR  Neck: supple. No Thyromegaly. Lungs: clear to auscultation. No retractions, x2 right-sided chest tubes to suction noted serosanguineous drainage, no air leak noted. Positive for left subclavian port. Cardiac: RRR. S1-S2,  no JVD. Abdomen: soft, large, nontender, bowel sounds present no guarding or rebound tenderness noted  Extremities:  No clubbing, cyanosis, or edema x 4.     Vasculature: capillary refill < 3 seconds. Palpable dorsalis pedis pulses. Skin:  warm and dry. Psych:  Alert and oriented x3. Affect appropriate  Lymph:  No supraclavicular adenopathy. Neurologic:  No focal deficit. No seizures. Data: (All radiographs, tracings, PFTs, and imaging are personally viewed and interpreted unless otherwise noted).  9/30/2020--sodium 143 potassium 3.1 chlorides 106 CO2 23 BUN 17 creatinine 0.9 glucose 185 white count 5.0 hemoglobin 15.5 hematocrit 46.7, platelet count 411, INR 1.02.   INR 1.02   Urine negative blood negative nitrates negative leukocytes trace ketones   Chest x-ray-9/30/2020. There is volume loss within the right hemithorax likely related to postsurgical change. 2 large bore right-sided chest tubes are seen. No pneumothorax is seen at this time.  9/28/2020 EKG normal sinus rhythm heart rate 89     Cardiac telemetry normal sinus rhythm      Seen with multidisciplinary ICU team yes. Meets Continued ICU Level Care Criteria:    [x] Yes   [] No - Transfer Planned to listed location:  [] HOSPITALIST CONTACTED-      Case and plan discussed with Dr. Dasia Raygoza. Electronically signed by LAUREN Velazco - CNP  CRITICAL CARE SPECIALIST  Patient seen by me. Case discussed with nurse practitioner. Case discussed with Dr. Wilma King. Patient with hypoxemia postoperatively. Patient status post right upper lobe resection for adenocarcinoma. Holding on prophylactic Lovenox pending CT surgery approval.  Gentle hydration as patient is immediately postoperative lobe resection and his third spacing with edema in the area. Electronically signed by Caridad Raygoza MD.

## 2020-09-30 NOTE — PROGRESS NOTES
Patient arrived at [phone] on a simple mask, resting with eyes closed in bed.    1455- VSS, no complaints of pain    1500-  VSS, no complaints of pain, on 4L nasal cannula    1505- VSS, no complaints of pain, X ray here     1510- VSS, no complaints of pain    1515- on 2L nasal cannula, VSS    1524- 50 fent given    1532- meets pacu discharge criteria.  Hooked up to transport monitor and waiting for transport to come

## 2020-10-01 ENCOUNTER — APPOINTMENT (OUTPATIENT)
Dept: GENERAL RADIOLOGY | Age: 60
DRG: 120 | End: 2020-10-01
Attending: THORACIC SURGERY (CARDIOTHORACIC VASCULAR SURGERY)
Payer: COMMERCIAL

## 2020-10-01 PROBLEM — J95.2 ACUTE POSTOPERATIVE PULMONARY INSUFFICIENCY (HCC): Status: ACTIVE | Noted: 2020-10-01

## 2020-10-01 LAB
ANION GAP SERPL CALCULATED.3IONS-SCNC: 11 MEQ/L (ref 8–16)
BUN BLDV-MCNC: 10 MG/DL (ref 7–22)
CALCIUM IONIZED: 1.12 MMOL/L (ref 1.12–1.32)
CALCIUM SERPL-MCNC: 8.9 MG/DL (ref 8.5–10.5)
CHLORIDE BLD-SCNC: 104 MEQ/L (ref 98–111)
CO2: 23 MEQ/L (ref 23–33)
CREAT SERPL-MCNC: 0.7 MG/DL (ref 0.4–1.2)
ERYTHROCYTE [DISTWIDTH] IN BLOOD BY AUTOMATED COUNT: 14.1 % (ref 11.5–14.5)
ERYTHROCYTE [DISTWIDTH] IN BLOOD BY AUTOMATED COUNT: 46.2 FL (ref 35–45)
GFR SERPL CREATININE-BSD FRML MDRD: > 90 ML/MIN/1.73M2
GLUCOSE BLD-MCNC: 166 MG/DL (ref 70–108)
GLUCOSE BLD-MCNC: 179 MG/DL (ref 70–108)
GLUCOSE BLD-MCNC: 188 MG/DL (ref 70–108)
GLUCOSE BLD-MCNC: 189 MG/DL (ref 70–108)
GLUCOSE BLD-MCNC: 191 MG/DL (ref 70–108)
GLUCOSE BLD-MCNC: 194 MG/DL (ref 70–108)
GLUCOSE BLD-MCNC: 215 MG/DL (ref 70–108)
HCT VFR BLD CALC: 41 % (ref 42–52)
HEMOGLOBIN: 13.4 GM/DL (ref 14–18)
MAGNESIUM: 1.8 MG/DL (ref 1.6–2.4)
MCH RBC QN AUTO: 29.6 PG (ref 26–33)
MCHC RBC AUTO-ENTMCNC: 32.7 GM/DL (ref 32.2–35.5)
MCV RBC AUTO: 90.5 FL (ref 80–94)
PLATELET # BLD: 195 THOU/MM3 (ref 130–400)
PMV BLD AUTO: 9.8 FL (ref 9.4–12.4)
POTASSIUM SERPL-SCNC: 4.1 MEQ/L (ref 3.5–5.2)
RBC # BLD: 4.53 MILL/MM3 (ref 4.7–6.1)
SODIUM BLD-SCNC: 138 MEQ/L (ref 135–145)
WBC # BLD: 6.2 THOU/MM3 (ref 4.8–10.8)

## 2020-10-01 PROCEDURE — 2580000003 HC RX 258: Performed by: PHYSICIAN ASSISTANT

## 2020-10-01 PROCEDURE — 2000000000 HC ICU R&B

## 2020-10-01 PROCEDURE — 2500000003 HC RX 250 WO HCPCS: Performed by: NURSE PRACTITIONER

## 2020-10-01 PROCEDURE — 6370000000 HC RX 637 (ALT 250 FOR IP): Performed by: THORACIC SURGERY (CARDIOTHORACIC VASCULAR SURGERY)

## 2020-10-01 PROCEDURE — 80048 BASIC METABOLIC PNL TOTAL CA: CPT

## 2020-10-01 PROCEDURE — 2500000003 HC RX 250 WO HCPCS: Performed by: ANESTHESIOLOGY

## 2020-10-01 PROCEDURE — APPSS60 APP SPLIT SHARED TIME 46-60 MINUTES: Performed by: PHYSICIAN ASSISTANT

## 2020-10-01 PROCEDURE — 36415 COLL VENOUS BLD VENIPUNCTURE: CPT

## 2020-10-01 PROCEDURE — 85027 COMPLETE CBC AUTOMATED: CPT

## 2020-10-01 PROCEDURE — 99233 SBSQ HOSP IP/OBS HIGH 50: CPT | Performed by: INTERNAL MEDICINE

## 2020-10-01 PROCEDURE — 71045 X-RAY EXAM CHEST 1 VIEW: CPT

## 2020-10-01 PROCEDURE — 83735 ASSAY OF MAGNESIUM: CPT

## 2020-10-01 PROCEDURE — 82948 REAGENT STRIP/BLOOD GLUCOSE: CPT

## 2020-10-01 PROCEDURE — 2700000000 HC OXYGEN THERAPY PER DAY

## 2020-10-01 PROCEDURE — 6360000002 HC RX W HCPCS: Performed by: THORACIC SURGERY (CARDIOTHORACIC VASCULAR SURGERY)

## 2020-10-01 PROCEDURE — 6370000000 HC RX 637 (ALT 250 FOR IP): Performed by: PHYSICIAN ASSISTANT

## 2020-10-01 PROCEDURE — 37799 UNLISTED PX VASCULAR SURGERY: CPT

## 2020-10-01 PROCEDURE — 94761 N-INVAS EAR/PLS OXIMETRY MLT: CPT

## 2020-10-01 PROCEDURE — 82330 ASSAY OF CALCIUM: CPT

## 2020-10-01 RX ORDER — ENALAPRILAT 2.5 MG/2ML
0.62 INJECTION INTRAVENOUS EVERY 6 HOURS
Status: DISCONTINUED | OUTPATIENT
Start: 2020-10-01 | End: 2020-10-03

## 2020-10-01 RX ADMIN — POTASSIUM CHLORIDE AND SODIUM CHLORIDE: 450; 150 INJECTION, SOLUTION INTRAVENOUS at 05:18

## 2020-10-01 RX ADMIN — ENALAPRILAT 0.62 MG: 2.5 INJECTION INTRAVENOUS at 21:21

## 2020-10-01 RX ADMIN — OXYCODONE HYDROCHLORIDE AND ACETAMINOPHEN 1 TABLET: 5; 325 TABLET ORAL at 05:18

## 2020-10-01 RX ADMIN — Medication 20 ML: at 21:22

## 2020-10-01 RX ADMIN — Medication 8 ML/HR: at 21:55

## 2020-10-01 RX ADMIN — Medication 10 ML: at 09:06

## 2020-10-01 RX ADMIN — OXYCODONE HYDROCHLORIDE AND ACETAMINOPHEN 1 TABLET: 5; 325 TABLET ORAL at 09:20

## 2020-10-01 RX ADMIN — Medication 10 ML: at 09:16

## 2020-10-01 RX ADMIN — OXYCODONE HYDROCHLORIDE AND ACETAMINOPHEN 1 TABLET: 5; 325 TABLET ORAL at 21:20

## 2020-10-01 RX ADMIN — OXYCODONE HYDROCHLORIDE AND ACETAMINOPHEN 1 TABLET: 5; 325 TABLET ORAL at 13:35

## 2020-10-01 RX ADMIN — TRAZODONE HYDROCHLORIDE 50 MG: 50 TABLET ORAL at 21:20

## 2020-10-01 ASSESSMENT — PAIN DESCRIPTION - PAIN TYPE
TYPE: SURGICAL PAIN

## 2020-10-01 ASSESSMENT — PAIN DESCRIPTION - FREQUENCY
FREQUENCY: CONTINUOUS

## 2020-10-01 ASSESSMENT — PAIN SCALES - GENERAL
PAINLEVEL_OUTOF10: 5
PAINLEVEL_OUTOF10: 6
PAINLEVEL_OUTOF10: 2
PAINLEVEL_OUTOF10: 0
PAINLEVEL_OUTOF10: 5
PAINLEVEL_OUTOF10: 5
PAINLEVEL_OUTOF10: 2
PAINLEVEL_OUTOF10: 0

## 2020-10-01 ASSESSMENT — PAIN DESCRIPTION - LOCATION
LOCATION: BACK

## 2020-10-01 ASSESSMENT — PAIN DESCRIPTION - DESCRIPTORS
DESCRIPTORS: ACHING
DESCRIPTORS: SORE
DESCRIPTORS: ACHING
DESCRIPTORS: ACHING

## 2020-10-01 ASSESSMENT — PAIN DESCRIPTION - ORIENTATION
ORIENTATION: RIGHT

## 2020-10-01 ASSESSMENT — PAIN DESCRIPTION - ONSET
ONSET: ON-GOING

## 2020-10-01 ASSESSMENT — PAIN - FUNCTIONAL ASSESSMENT
PAIN_FUNCTIONAL_ASSESSMENT: PREVENTS OR INTERFERES SOME ACTIVE ACTIVITIES AND ADLS

## 2020-10-01 NOTE — CARE COORDINATION
10/1/20, 9:48 AM EDT  DISCHARGE PLANNING EVALUATION:    Nabil Drake       Admitted from: Surgery 9/30/2020/ 2011 [facility] day: 1   Location: [address] [place] [address] Reason for admit: History of lung cancer [Z85.118] Status: IP  Admit order signed?: yes  PMH:  has a past medical history of Acid reflux, CAD (coronary artery disease), Cancer ([facility] Ejection fraction < 50%, Non Hodgkin's lymphoma ([facility] [address] [facility] Osteoarthritis, Tooth ache, and Type 2 diabetes mellitus without complication ([facility] [address] Procedure:   9/30 BRONCHOSCOPY, EPIDURAL CATHETER, RIGHT THORACOTOMY RIGHT UPPER LOBECTOMY and lymph node dissection  9/30 CXR: There is volume loss within the right hemithorax likely related to postsurgical change. Two large bore right-sided chest tubes are seen. No pneumothorax is seen at this time  10/1 CXR: Stable bilateral basilar atelectasis and small pleural effusions  Medications:  Scheduled Meds:   sodium chloride flush  10 mL Intravenous 2 times per day    traZODone  50 mg Oral Nightly    sodium chloride flush  10 mL Intravenous 2 times per day    insulin lispro  0-12 Units Subcutaneous Q4H     Continuous Infusions:   fentaNYL 750 mcg, ropivacaine 0.1% in sodium chloride 0.9% 265mL 8 mL/hr (09/30/20 1600)    dextrose      0.45 % NaCl with KCl 20 mEq 50 mL/hr at 10/01/20 0915      Pertinent Info/Orders/Treatment Plan: POD #1. Afebrile. NSR. Sats 92% on 2L O2. Ox4. Epidural catheter. CT x2 to -20 sx, no leak, with 306 ml out since surgery. Telemetry, CT care, SCDs, bernard care. IVF w/K+, epidural catheter, SSI Q4H, prn percocet. IV ATB completed. Hgb 13.4. Diet: DIET DENTAL SOFT; Carb Control: 4 carb choices (60 gms)/meal   Smoking status:  reports that he has never smoked.  He has never used smokeless tobacco.   PCP: Shelly Briscoe MD, MD  Readmission 30 days or less: no  Readmission Risk Score: 13%    Discharge Planning Evaluation  Current Residence:     Living Arrangements:      Support Systems:     Current Services PTA: Potential Assistance Needed:     Potential Assistance Purchasing Medications:     Does patient want to participate in local refill/ meds to beds program?     Type of Home Care Services:     Patient expects to be discharged to:     Expected Discharge date: Follow Up Appointment: Best Day/ Time:      Patient Goals/Plan/Treatment Preferences: Spoke with Nabil; states he lives at home with his wife and did not use any DME or have any HH services PTA. He drives, cares for himself independently, and has a PCP. Nabil states he plans to return home at discharge, denies needs, and declines HH stating he doesn't need it. Transportation/Food Security/Housekeeping Addressed:  No issues identified.     Evaluation: no

## 2020-10-01 NOTE — PROGRESS NOTES
CT/CV Surgery Progress Note    10/1/2020 8:17 AM  Surgeon:  Dr. Karen Tripathi     Subjective:  Mr. Leena Kendrick is resting comfortably in bed, alert, and in no acute distress. Cr 0.7. WBC 6.2. Hgb 13.4. Chest tubes drained 306 cc's in the last 24 hours and 94 cc's in the last 8 hours. Pain being controlled with epidural.No air leak noted. Pt denies chest pressure, SOB, fever,chills, N/V/D. Vital Signs: BP (!) 142/75   Pulse 88   Temp 97.9 °F (36.6 °C) (Oral)   Resp 15   Ht 6' (1.829 m)   Wt (!) 334 lb 12.8 oz (151.9 kg)   SpO2 94%   BMI 45.41 kg/m²    Temp (24hrs), Av.5 °F (35.8 °C), Min:91.6 °F (33.1 °C), Max:98.2 °F (36.8 °C)      PULSE OXIMETRY RANGE: SpO2  Av.5 %  Min: 63 %  Max: 100 %     Labs:   CBC:     Recent Labs     20  0932 20  0601 10/01/20  0400   WBC 5.3 5.0 6.2   HGB 15.7 15.5 13.4*   HCT 46.9 46.7 41.0*   MCV 89.7 89.8 90.5    208 195   APTT  --  29.9  --    INR 0.95 1.02  --      BMP:   Recent Labs     20  0601  10/01/20  0359 10/01/20  0400     --   --  138   K 4.1  --   --  4.1     --   --  104   CO2 23  --   --  23   BUN 17  --   --  10   CREATININE 0.9  --   --  0.7   MG  --   --   --  1.8   POCGLU  --    < > 179*  --     < > = values in this interval not displayed. Last HgA1C:   Lab Results   Component Value Date    LABA1C 6.6 (H) 2020       Imaging:  CXR: I have reviewed the CXR image. Intake/Output Summary (Last 24 hours) at 10/1/2020 0817  Last data filed at 10/1/2020 0520  Gross per 24 hour   Intake 4025 ml   Output 2906 ml   Net 1119 ml       Scheduled Meds:    sodium chloride flush  10 mL Intravenous 2 times per day    traZODone  50 mg Oral Nightly    sodium chloride flush  10 mL Intravenous 2 times per day    insulin lispro  0-12 Units Subcutaneous Q4H       ROS: All neg unless specifically mentioned in subjective section.      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 CXR reviewed- Continue daily CXR's   2. Continue epidural for pain control  3.  Discuss diuresis and fluids with Dr. Morse Organ    The plan of care was discussed in detail with Dr. Jp Cabello PA-C

## 2020-10-01 NOTE — FLOWSHEET NOTE
Pt is a 56year old male who is in bed on ICU, 4D. No family is in the room. Pt stated he is not Tenriism, he accepted a prayer card. 10/01/20 1400   Encounter Summary   Services provided to: Patient   Referral/Consult From: Rounding   Continue Visiting Yes  (10/1)   Complexity of Encounter Low   Length of Encounter 15 minutes   Routine   Type Initial   Spiritual/Yazdanism   Type Spiritual support   Care Plan:  Continue spiritual and emotional care for patient and family.

## 2020-10-01 NOTE — PLAN OF CARE
Care plan reviewed with patient. Verbalized understanding plan of care and contribute to goal setting. Problem: Falls - Risk of:  Goal: Will remain free from falls  Description: Will remain free from falls  Outcome: Ongoing  Note: Patient remains free from falls. Bed in low position, locked, and alarm on. Patient up with assistance. Fall risk band on and sign posted. Fall risk assessment completed. Nonskid footwear applied. Room adequately lit. Call light and bedside table with in reach. Will continue to monitor. Problem: Falls - Risk of:  Goal: Absence of physical injury  Description: Absence of physical injury  Outcome: Ongoing  Note: No physical injury throughout shift.       Problem: Pain:  Goal: Pain level will decrease  Description: Pain level will decrease  Outcome: Ongoing  Note: Patient has pain controled with oral pain medication and epidural.

## 2020-10-01 NOTE — PROGRESS NOTES
CRITICAL CARE PROGRESS NOTE      Patient:  Allison Woodward    Unit/Bed:4D-14/014-A  YOB: 1960  MRN: [id number]   PCP: Pio Laguna MD, MD  Date of Admission: 9/30/2020  Chief Complaint:- Chest Pain POD#1    Assessment and Plan:    1. Acute pulmonary insufficiency following surgery, hypoxia: Patient is currently on 2 L oxygen/nasal cannula. He has been satting well between 92-95%. Will continue titration to SaO2 greater than 92%. 2. Lung cancer, right upper lobe adenocarcinoma: PET scanning shows elevated SUV in the right upper lobe without evidence of distant metastasis. POD#1 s/p bronchoscopy, epidural catheter, right thoracotomy, right upper lobectomy and lymph node dissection. Mild pleural effusion. Pathology is pending. Patient does have 2 large-bore chest tubes to suction which had produced 300cc over 24hrs. Pain control with fentanyl via epidural catheter. Dr. Ta Ac, CV surgery to follow. 3. DMT2, uncontrolled:  A1c 9/28/2020 6.6, patient did receive dexamethasone intraoperatively likely contributing. ACCU and medium SSI began yesterday for glucose correction. Most recent glucose 194.   4. Nonobstructive CAD: History of [facility]-2019, preoperative echo completed on 9/ 2020 LVEF 45%. Aspirin is on hold. Patient is complaining of pleuritic right-sided chest pain likely secondary to surgery. Discussed incentive spirometry utilization with the patient to prevent further atelectasis and fluid accumulation. 5. HFrEF, chronic: Echo 9/2020 LVEF 45%, stable, will continue to monitor. 6. Non-Hodgkin's lymphoma: History, status post 6 cycles of chemotherapy with last treatment in May 2020. PET scan May 2020 no evidence of other malignancy. Established patient of Dr. Omari Ruiz. We will continue to monitor. 7. GERD: history, monitor. Patient not complaining of reflux or burning cp and not on any home medications. 8. Insomnia, chronic: Trazodone qhs, controlled.    9. Ascending aorta dilitation: Noted to be mildly dilated on ECHO 2020 will continue to monitor. 10. Morbid Obesity :  BMI 45.41      INITIAL H AND P AND ICU COURSE:    Nabil Don is a 68-year-old  male admitted to [address] 2020 status post right upper lobectomy and tumor removal.     Patient has a past medical history that is significant for lifetime non-smoker, nonobstructive CAD-[facility]  no significant coronary artery disease noted, normal LV systolic function, ECHO 6080 LVEF 45%, cardiac Lexiscan stress test-2020- for ischemia, Ascending aorta is mildly dilated, Adenocarcinoma of the right upper lobe of lung-established patient of Dr. Danilo Ackerman,, non-Hodgkin's lymphoma-status post 6 cycles of chemotherapy with the last treatment finished in May 2020, GERD, DMT2.     Preoperative work-up: Pulmonary function testing; FEV1 is 3.8 L, 105% of predicted value.  PET scan shows elevated SUV in the right upper lobe lung lesion without evidence of distant metastasis. History of pulmonary mass which he did undergo biopsy by CT guidance. Surgical pathology confirmed an adenocarcinoma most consistent with a lung primary.  On a PET/CT scan in May there was no evidence of other sites of malignancy. Megan Sebastian was no mediastinal or hilar lymphadenopathy identified.      2020 under the care of Dr. Lay Peralta did undergo bronchoscopy, epidural catheter placement, right thoracotomy right upper lobectomy and lymph node dissection. Lung cancer located in right upper lobe anterior segment. No pleural effusion. No adhesion in the pleural space. The tumor did not appear to penetrate the visceral pleural.  Chest tube x2 to suction. Patient was extubated in the operating room. Patient was admitted to the ICU for further evaluation and care.         Past Medical History: See HPI. Family History: Mother Payal Badillo -coronary disease, CVA. Social History: Lifetime non-smoker denies any alcohol or illicit drug use.        ROS seconds. Palpable dorsalis pedis pulses. Skin:  warm and dry. Psych:  Alert and oriented x3. Affect appropriate  Lymph:  No supraclavicular adenopathy. Neurologic:  No focal deficit. No seizures. Data: (All radiographs, tracings, PFTs, and imaging are personally viewed and interpreted unless otherwise noted). Oxygen Delivery - O2 Flow Rate (L/min): 2 L/min      CENTRAL LINES/CHEMOPORT/TUNNEL CATH:-    [x] No   [] Yes           If yes -    [] Right IJ   [] Left IJ [] Right Femoral [] Left Femoral     [] Right Subclavian [] Left Subclavian  [x] Peripheral IV access right forearm  [x] Arterial Line (Left Radial)  Epidural in place for pain control. COFFEY CATHETER:-   [] No  [x] Yes   (20)      Radiology:(All radiographs, tracings, PFTs, and imaging are personally viewed and interpreted unless otherwise noted). Portable CXR 10/1/20- two right sided chest tubes, small bilateral effusion, and bibasilar atelectasis. EK20, NSR with rightward axis, rate 89, pr 198, qrs 108, qt 364. Labs:   Na 138, K 4.1, Cl 104, CO2 23, BUN 10, Cr 0.7  Hgb 13.4, Hct 41, WBC 6.2, Plt 195       CODE STATUS:-  [x] Full resuscitation [] DNR-Comfort Care-Arrest  [] DNR-Comfort Care      Seen with multidisciplinary ICU team.  Meets Continued ICU Level Care Criteria:    [x] Yes   [] No - Transfer Planned to listed location: Step down  [] HOSPITALIST CONTACTED-      Case and plan discussed with Dr. Iam Burton. Electronically signed by Maxi Romano DO  Patient seen by me. Case discussed with resident physician. Patient status post right upper lobe resection for adenocarcinoma of the lung. Patient doing fairly well but still requiring epidural for pain control. Patient developing some left lower lobe atelectasis on chest x-ray. Initiate pulmonary toilet with flutter valve. Repeat chest x-ray in the morning. Case discussed with Dr. Jayesh Ragland  Electronically signed by Lowell Garza.  Iam Burton MD.

## 2020-10-01 NOTE — ANESTHESIA POSTPROCEDURE EVALUATION
Department of Anesthesiology  Postprocedure Note    Patient: Lisseth Garzon  MRN: [id number]  YOB: 1960  Date of evaluation: 10/1/2020  Time:  1:10 PM     Procedure Summary     Date:  09/30/20 Room / Location:  [facility] OR 06 / [facility] OR    Anesthesia Start:  8142 Anesthesia Stop:  8778    Procedure:  BRONCHOSCOPY, EPIDURAL CATHETER, RIGHT THORACOTOMY RIGHT UPPER LOBECTOMY (Right ) Diagnosis:  (LUNG CANCER)    Surgeon:  Maxi Westfall MD Responsible Provider:  Debbie Maloney MD    Anesthesia Type:  general, epidural ASA Status:  4          Anesthesia Type: general, epidural    Carmen Phase I: Carmen Score: 8    Carmen Phase II:      Last vitals: Reviewed and per EMR flowsheets. Anesthesia Post Evaluation    Patient location during evaluation: ICU  Patient participation: complete - patient cannot participate  Level of consciousness: awake and alert  Pain score: 6  Airway patency: patent  Nausea & Vomiting: no nausea and no vomiting  Complications: no  Cardiovascular status: hemodynamically stable  Respiratory status: nasal cannula  Hydration status: stable  Comments: Patient was in ICU and looked to be comfortable and resting in bed. Patient states that his pain is being covered appropriately from epidural and PO medication.  Patient is only sating 91-93% on NC.

## 2020-10-02 ENCOUNTER — APPOINTMENT (OUTPATIENT)
Dept: CT IMAGING | Age: 60
DRG: 120 | End: 2020-10-02
Attending: THORACIC SURGERY (CARDIOTHORACIC VASCULAR SURGERY)
Payer: COMMERCIAL

## 2020-10-02 ENCOUNTER — APPOINTMENT (OUTPATIENT)
Dept: GENERAL RADIOLOGY | Age: 60
DRG: 120 | End: 2020-10-02
Attending: THORACIC SURGERY (CARDIOTHORACIC VASCULAR SURGERY)
Payer: COMMERCIAL

## 2020-10-02 LAB
ANION GAP SERPL CALCULATED.3IONS-SCNC: 9 MEQ/L (ref 8–16)
BASOPHILS # BLD: 0 %
BASOPHILS ABSOLUTE: 0 THOU/MM3 (ref 0–0.1)
BUN BLDV-MCNC: 10 MG/DL (ref 7–22)
CALCIUM SERPL-MCNC: 9 MG/DL (ref 8.5–10.5)
CHLORIDE BLD-SCNC: 100 MEQ/L (ref 98–111)
CO2: 25 MEQ/L (ref 23–33)
CREAT SERPL-MCNC: 0.6 MG/DL (ref 0.4–1.2)
DIFFERENTIAL, MANUAL: NORMAL
EOSINOPHIL # BLD: 2 %
EOSINOPHILS ABSOLUTE: 0.1 THOU/MM3 (ref 0–0.4)
ERYTHROCYTE [DISTWIDTH] IN BLOOD BY AUTOMATED COUNT: 14.4 % (ref 11.5–14.5)
ERYTHROCYTE [DISTWIDTH] IN BLOOD BY AUTOMATED COUNT: 49 FL (ref 35–45)
GFR SERPL CREATININE-BSD FRML MDRD: > 90 ML/MIN/1.73M2
GLUCOSE BLD-MCNC: 167 MG/DL (ref 70–108)
GLUCOSE BLD-MCNC: 179 MG/DL (ref 70–108)
GLUCOSE BLD-MCNC: 186 MG/DL (ref 70–108)
GLUCOSE BLD-MCNC: 196 MG/DL (ref 70–108)
GLUCOSE BLD-MCNC: 200 MG/DL (ref 70–108)
GLUCOSE BLD-MCNC: 235 MG/DL (ref 70–108)
GLUCOSE BLD-MCNC: 236 MG/DL (ref 70–108)
HCT VFR BLD CALC: 42.2 % (ref 42–52)
HEMOGLOBIN: 13.4 GM/DL (ref 14–18)
HYPERSEGMENTED NEUTROPHILS: PRESENT
LYMPHOCYTES # BLD: 13 %
LYMPHOCYTES ABSOLUTE: 0.8 THOU/MM3 (ref 1–4.8)
MCH RBC QN AUTO: 29.6 PG (ref 26–33)
MCHC RBC AUTO-ENTMCNC: 31.8 GM/DL (ref 32.2–35.5)
MCV RBC AUTO: 93.2 FL (ref 80–94)
MONOCYTES # BLD: 6 %
MONOCYTES ABSOLUTE: 0.4 THOU/MM3 (ref 0.4–1.3)
MRSA SCREEN: NORMAL
NUCLEATED RED BLOOD CELLS: 0 /100 WBC
PATHOLOGIST REVIEW: ABNORMAL
PLATELET # BLD: 173 THOU/MM3 (ref 130–400)
PLATELET ESTIMATE: ADEQUATE
PMV BLD AUTO: 10 FL (ref 9.4–12.4)
POTASSIUM SERPL-SCNC: 4.5 MEQ/L (ref 3.5–5.2)
RBC # BLD: 4.53 MILL/MM3 (ref 4.7–6.1)
SEG NEUTROPHILS: 79 %
SEGMENTED NEUTROPHILS ABSOLUTE COUNT: 4.7 THOU/MM3 (ref 1.8–7.7)
SODIUM BLD-SCNC: 134 MEQ/L (ref 135–145)
WBC # BLD: 5.9 THOU/MM3 (ref 4.8–10.8)

## 2020-10-02 PROCEDURE — 80048 BASIC METABOLIC PNL TOTAL CA: CPT

## 2020-10-02 PROCEDURE — 6370000000 HC RX 637 (ALT 250 FOR IP): Performed by: PHYSICIAN ASSISTANT

## 2020-10-02 PROCEDURE — 36415 COLL VENOUS BLD VENIPUNCTURE: CPT

## 2020-10-02 PROCEDURE — 99232 SBSQ HOSP IP/OBS MODERATE 35: CPT | Performed by: INTERNAL MEDICINE

## 2020-10-02 PROCEDURE — 2500000003 HC RX 250 WO HCPCS: Performed by: NURSE PRACTITIONER

## 2020-10-02 PROCEDURE — 6370000000 HC RX 637 (ALT 250 FOR IP): Performed by: STUDENT IN AN ORGANIZED HEALTH CARE EDUCATION/TRAINING PROGRAM

## 2020-10-02 PROCEDURE — 6370000000 HC RX 637 (ALT 250 FOR IP): Performed by: THORACIC SURGERY (CARDIOTHORACIC VASCULAR SURGERY)

## 2020-10-02 PROCEDURE — 85025 COMPLETE CBC W/AUTO DIFF WBC: CPT

## 2020-10-02 PROCEDURE — 71250 CT THORAX DX C-: CPT

## 2020-10-02 PROCEDURE — APPSS30 APP SPLIT SHARED TIME 16-30 MINUTES: Performed by: PHYSICIAN ASSISTANT

## 2020-10-02 PROCEDURE — 71045 X-RAY EXAM CHEST 1 VIEW: CPT

## 2020-10-02 PROCEDURE — 94761 N-INVAS EAR/PLS OXIMETRY MLT: CPT

## 2020-10-02 PROCEDURE — 6360000002 HC RX W HCPCS: Performed by: PHYSICIAN ASSISTANT

## 2020-10-02 PROCEDURE — 2000000000 HC ICU R&B

## 2020-10-02 PROCEDURE — 94669 MECHANICAL CHEST WALL OSCILL: CPT

## 2020-10-02 PROCEDURE — 2580000003 HC RX 258: Performed by: PHYSICIAN ASSISTANT

## 2020-10-02 PROCEDURE — 82948 REAGENT STRIP/BLOOD GLUCOSE: CPT

## 2020-10-02 RX ADMIN — ENALAPRILAT 0.62 MG: 2.5 INJECTION INTRAVENOUS at 08:13

## 2020-10-02 RX ADMIN — ENALAPRILAT 0.62 MG: 2.5 INJECTION INTRAVENOUS at 03:01

## 2020-10-02 RX ADMIN — TRAZODONE HYDROCHLORIDE 50 MG: 50 TABLET ORAL at 20:25

## 2020-10-02 RX ADMIN — ENALAPRILAT 0.62 MG: 2.5 INJECTION INTRAVENOUS at 14:24

## 2020-10-02 RX ADMIN — METOPROLOL TARTRATE 25 MG: 25 TABLET ORAL at 11:18

## 2020-10-02 RX ADMIN — OXYCODONE HYDROCHLORIDE AND ACETAMINOPHEN 2 TABLET: 5; 325 TABLET ORAL at 16:59

## 2020-10-02 RX ADMIN — METOPROLOL TARTRATE 25 MG: 25 TABLET ORAL at 20:25

## 2020-10-02 RX ADMIN — Medication 10 ML: at 20:26

## 2020-10-02 RX ADMIN — POTASSIUM CHLORIDE AND SODIUM CHLORIDE: 450; 150 INJECTION, SOLUTION INTRAVENOUS at 21:06

## 2020-10-02 RX ADMIN — Medication 10 ML: at 08:13

## 2020-10-02 RX ADMIN — POTASSIUM CHLORIDE AND SODIUM CHLORIDE: 450; 150 INJECTION, SOLUTION INTRAVENOUS at 03:01

## 2020-10-02 RX ADMIN — ENALAPRILAT 0.62 MG: 2.5 INJECTION INTRAVENOUS at 20:25

## 2020-10-02 ASSESSMENT — PAIN SCALES - GENERAL
PAINLEVEL_OUTOF10: 9
PAINLEVEL_OUTOF10: 2

## 2020-10-02 ASSESSMENT — PAIN DESCRIPTION - ORIENTATION
ORIENTATION: RIGHT
ORIENTATION: RIGHT

## 2020-10-02 ASSESSMENT — PAIN DESCRIPTION - FREQUENCY
FREQUENCY: CONTINUOUS
FREQUENCY: INTERMITTENT

## 2020-10-02 ASSESSMENT — PAIN - FUNCTIONAL ASSESSMENT
PAIN_FUNCTIONAL_ASSESSMENT: PREVENTS OR INTERFERES SOME ACTIVE ACTIVITIES AND ADLS
PAIN_FUNCTIONAL_ASSESSMENT: PREVENTS OR INTERFERES SOME ACTIVE ACTIVITIES AND ADLS

## 2020-10-02 ASSESSMENT — PAIN DESCRIPTION - PAIN TYPE
TYPE: SURGICAL PAIN
TYPE: SURGICAL PAIN

## 2020-10-02 ASSESSMENT — PAIN DESCRIPTION - DESCRIPTORS
DESCRIPTORS: ACHING;SHARP
DESCRIPTORS: ACHING

## 2020-10-02 ASSESSMENT — PAIN DESCRIPTION - ONSET
ONSET: ON-GOING
ONSET: ON-GOING

## 2020-10-02 ASSESSMENT — PAIN DESCRIPTION - LOCATION
LOCATION: BACK
LOCATION: BACK

## 2020-10-02 NOTE — PROGRESS NOTES
CRITICAL CARE PROGRESS NOTE      Patient:  Brayden Nicholas    Unit/Bed:4D-14/014-A  YOB: 1960  MRN: [id number]   PCP: José Manuel Oquendo MD, MD  Date of Admission: 9/30/2020  Chief Complaint:- Chest Pain POD#2    Assessment and Plan:    1. Acute pulmonary insufficiency following surgery, hypoxia: Patient is currently on 2 L oxygen/nasal cannula. He has been satting well between 92-97%. Will continue titration to maintain SaO2 greater than 92%. Chest x-ray revealed worsening right infrahilar infiltration and left perihilar infiltration. Noncontrast CT chest ordered for further evaluation, multifocal atelectasis without suspected infiltrate. Patient has remained afebrile. Discussed incentive spirometry utilization with the patient to prevent further atelectasis and fluid accumulation. RT administered acapella flutter valve to help with any expectoration and mucous clearing. 2. Lung cancer, right upper lobe adenocarcinoma:  POD#2 s/p bronchoscopy, epidural catheter, right thoracotomy, right upper lobectomy and lymph node dissection. Mild pleural effusion. Pathology is pending. Patient does have 2 large-bore chest tubes to suction which had produced 210cc over 24hrs. Pain control with fentanyl via epidural catheter. Dr. Elaine Scott, CV surgery to follow. He is planning on removing the epidural catheter tomorrow on postop day 3.  3. DMT2, uncontrolled:  A1c 9/28/2020 6.6, patient did receive dexamethasone intraoperatively likely contributing. ACCU and medium SSI began yesterday for glucose correction. Most recent glucose 186.   4. Nonobstructive CAD: History of [facility]-2019, preoperative echo completed on 9/ 2020 LVEF 45%. Aspirin is on hold. Patient still has mild pleuritic right-sided chest pain likely secondary to surgery and is successfully reduced with his epidural.  5. HFrEF, chronic: Echo 9/2020 LVEF 45%, stable, will continue to monitor.   6. Non-Hodgkin's lymphoma: History, status post 6 cycles of chemotherapy with last treatment in May 2020. PET scan May 2020 no evidence of other malignancy. Established patient of Dr. Maddie Mistry. We will continue to monitor. 7. GERD: history, monitor. Patient not complaining of reflux or burning cp and not on any home medications. 8. Insomnia, chronic: Trazodone qhs, controlled. 9. Ascending aorta dilitation: Noted to be mildly dilated on ECHO 9/2020 will continue to monitor. 10. Morbid Obesity :  BMI 45.41              INITIAL H AND P AND ICU COURSE:    Nabil Concepcion is a 49-year-old  male admitted to [address] 9/30/2020 status post right upper lobectomy and tumor removal.     Patient has a past medical history that is significant for lifetime non-smoker, nonobstructive CAD-[facility] 2019 no significant coronary artery disease noted, normal LV systolic function, ECHO 5/6965 LVEF 45%, cardiac Lexiscan stress test-9/20/2020- for ischemia, Ascending aorta is mildly dilated, Adenocarcinoma of the right upper lobe of lung-established patient of Dr. Maddie Mistry,, non-Hodgkin's lymphoma-status post 6 cycles of chemotherapy with the last treatment finished in May 2020, GERD, DMT2.     Preoperative work-up: Pulmonary function testing; FEV1 is 3.8 L, 105% of predicted value.  PET scan shows elevated SUV in the right upper lobe lung lesion without evidence of distant metastasis. History of pulmonary mass which he did undergo biopsy by CT guidance. Surgical pathology confirmed an adenocarcinoma most consistent with a lung primary.  On a PET/CT scan in May there was no evidence of other sites of malignancy. Lina Beckford was no mediastinal or hilar lymphadenopathy identified.      9/30/2020 under the care of Dr. Pat Valadez, patient did undergo bronchoscopy, epidural catheter placement, right thoracotomy right upper lobectomy and lymph node dissection. Lung cancer located in right upper lobe anterior segment. No pleural effusion. No adhesion in the pleural space.   The tumor did not appear to penetrate the visceral pleural.  Chest tube x2 to suction. Patient was extubated in the operating room. Patient was admitted to the ICU for further evaluation and care.         Past Medical History: See HPI. Family History: Mother Brent Morgan -coronary disease, CVA. Social History: Lifetime non-smoker denies any alcohol or illicit drug use.      ROS   GENERAL: Fatigue and weakness improving, denies any fever chills. SKN: No lesions or rashes. HEAD: No headaches or recent injury  EYES: No acute changes in vision, no diplopia or blurred vision, no drainage  EARS: No hearing loss, no tinnitus no drainage  NOSE/THROAT: No rhinorrhea or pharyngitis, no nasal drainage  NECK: No lumps or unusual neck stiffness  PULMONARY: Negative for cough, orthopnea or paroxysmal nocturnal dyspnea, stridor or wheezing  CARDIAC: Positive for right-sided chest pain with deep inspiration, denies any chest pressure or heaviness. GI: Denies any abdominal pain no history melena or hematochezia, no diarrhea or constipation  : No hematuria no suprapubic pressure no CVA tenderness  PERIPHERAL VASCULAR: No intermittent claudication or unusual leg cramps  MUSCULOSKELETAL: Occasional arthralgias, myalgias  NEUROLOGICAL: No seizures or Syncope  HEMATOLOGIC:  No unusual bruising or bleeding  PSYCH: Denies any homicidal or suicidal ideations    Scheduled Meds:   enalaprilat  0.625 mg Intravenous Q6H    sodium chloride flush  10 mL Intravenous 2 times per day    traZODone  50 mg Oral Nightly    sodium chloride flush  10 mL Intravenous 2 times per day    insulin lispro  0-12 Units Subcutaneous Q4H     Continuous Infusions:   fentaNYL 750 mcg, ropivacaine 0.1% in sodium chloride 0.9% 265mL 8 mL/hr (10/01/20 2155)    dextrose      0.45 % NaCl with KCl 20 mEq 50 mL/hr at 10/02/20 0301       PHYSICAL EXAMINATION:  T: 98.2 P: 102. RR:  18. B/P: 154/103 FiO2:  2L NC. O2 Sat: 94.  I/O:  Net -802  Body mass index is 44.58 kg/m². GCS:   15  General:   Morbidly obese male, pale, no acute distress  HEENT:  normocephalic and atraumatic. No scleral icterus. PERRL  Neck: supple. No Thyromegaly. Lungs: Bilateral basilar rales. No retractions. Cardiac: RRR. Clear s1s2. No JVD. Abdomen: soft. Nontender, no acute distension  Extremities:  No clubbing, cyanosis, or edema x 4. Vasculature: capillary refill < 3 seconds. Palpable dorsalis pedis pulses. Skin:  warm and dry. Psych:  Alert and oriented x3. Affect appropriate  Lymph:  No supraclavicular adenopathy. Neurologic:  No focal deficit. No seizures. Data: (All radiographs, tracings, PFTs, and imaging are personally viewed and interpreted unless otherwise noted). Oxygen Delivery - O2 Flow Rate (L/min): 2 L/min      CENTRAL LINES/CHEMOPORT/TUNNEL CATH:-    [x] No   [] Yes           If yes -    [] Right IJ   [] Left IJ [] Right Femoral [] Left Femoral     [] Right Subclavian [] Left Subclavian  [x] Peripheral IV access right forearm  [x] Arterial Line (Left Radial)  Epidural in place for pain control. COFFEY CATHETER:-   [] No  [x] Yes   (20)      Radiology:(All radiographs, tracings, PFTs, and imaging are personally viewed and interpreted unless otherwise noted). Portable CXR 10/2/20- improved postsurgical tracheal deviation, worsening left perihilar infiltrate, worsening right infrahilar infiltrate, right-sided chest tube, left central line. Noncontrast CT chest 10/2/2020: Small right hydropneumothorax, multifocal atelectasis without suspected infiltrate. EK20, NSR with rightward axis, rate 89, pr 198, qrs 108, qt 364. Labs:   Sodium 134, potassium 4.5, chloride 100, bicarb 25, BUN 10, creatinine 0.6. No anion gap. Glucose 236. White blood cell 5.9, hemoglobin 13.4, hematocrit 42.2, platelets 779.       CODE STATUS:-  [x] Full resuscitation [] DNR-Comfort Care-Arrest  [] DNR-Comfort Care      Seen with multidisciplinary ICU team.  Pam Grant Continued ICU Level Care Criteria:    [x] Yes   [] No - Transfer Planned to listed location:  [] HOSPITALIST CONTACTED-      Case and plan discussed with Dr. Oliverio Faulkner. Electronically signed by Malika Bennett DO  Patient seen by me. Case discussed with resident physician. Case discussed with Dr. Quintin Villanueva.  fentaNYL 750 mcg, ropivacaine 0.1% in sodium chloride 0.9% 265mL 8 mL/hr (10/01/20 2155)    dextrose      0.45 % NaCl with KCl 20 mEq 50 mL/hr at 10/02/20 0301      metoprolol tartrate  25 mg Oral BID    enalaprilat  0.625 mg Intravenous Q6H    sodium chloride flush  10 mL Intravenous 2 times per day    traZODone  50 mg Oral Nightly    sodium chloride flush  10 mL Intravenous 2 times per day    insulin lispro  0-12 Units Subcutaneous Q4H   Epidural should be removed tomorrow. CT scan chest shows atelectasis. Continue with supportive care. Electronically signed by La Faulkner MD.

## 2020-10-02 NOTE — PROGRESS NOTES
CT/CV Surgery Progress Note    10/2/2020 10:10 AM  Surgeon:  Dr. Karlie Bhatti     Subjective:  Mr. Ifeanyi Guidry is resting comfortably in bed NC, alert, and in no acute distress. No major events overnight. Chest tubes drained 237 cc's in the last 24 hours and 20 cc's in the last 8 hours. Pain being controlled with epidural. No air leak noted. CT chest and CXR reviewed. Pt denies chest pressure, SOB, fever,chills, N/V/D. Vital Signs: BP (!) 154/103   Pulse 102   Temp 98.2 °F (36.8 °C) (Oral)   Resp 22   Ht 6' (1.829 m)   Wt (!) 328 lb 11.3 oz (149.1 kg)   SpO2 94%   BMI 44.58 kg/m²    Temp (24hrs), Av.7 °F (37.1 °C), Min:97.8 °F (36.6 °C), Max:99.6 °F (37.6 °C)      PULSE OXIMETRY RANGE: SpO2  Av.6 %  Min: 91 %  Max: 97 %     Labs:   CBC:     Recent Labs     20  0601 10/01/20  0400   WBC 5.0 6.2   HGB 15.5 13.4*   HCT 46.7 41.0*   MCV 89.8 90.5    195   APTT 29.9  --    INR 1.02  --      BMP:   Recent Labs     20  0601  10/01/20  0400  10/02/20  0812     --  138  --   --    K 4.1  --  4.1  --   --      --  104  --   --    CO2 23  --  23  --   --    BUN 17  --  10  --   --    CREATININE 0.9  --  0.7  --   --    MG  --   --  1.8  --   --    POCGLU  --    < >  --    < > 200*    < > = values in this interval not displayed. Last HgA1C:   Lab Results   Component Value Date    LABA1C 6.6 (H) 2020       Imaging:  CXR: I have reviewed the CXR image. Intake/Output Summary (Last 24 hours) at 10/2/2020 1010  Last data filed at 10/2/2020 0601  Gross per 24 hour   Intake 1666.54 ml   Output 3037 ml   Net -1370.46 ml       Scheduled Meds:    enalaprilat  0.625 mg Intravenous Q6H    sodium chloride flush  10 mL Intravenous 2 times per day    traZODone  50 mg Oral Nightly    sodium chloride flush  10 mL Intravenous 2 times per day    insulin lispro  0-12 Units Subcutaneous Q4H       ROS: All neg unless specifically mentioned in subjective section.      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 CXR reviewed- Continue daily CXR's   2. Continue epidural for pain control  3.  Transfer to  tomorrow     The plan of care was discussed in detail with Dr. Leslee Hutton PA-C

## 2020-10-02 NOTE — PLAN OF CARE
Problem: Falls - Risk of:  Goal: Will remain free from falls  Description: Will remain free from falls  10/2/2020 1217 by Jose Kerr RN  Outcome: Ongoing  Note: Remains free from falls, remains in bed this shift due to epidural in place. Fall precautions in place. Problem: Falls - Risk of:  Goal: Absence of physical injury  Description: Absence of physical injury  10/2/2020 1217 by Jose Kerr RN  Outcome: Ongoing  Note: Remains free from injury. Problem: Pain:  Goal: Pain level will decrease  Description: Pain level will decrease  10/2/2020 1217 by Jose Kerr RN  Outcome: Ongoing  Note: Patient with intermittent mild complaints of pain - epidural remains in place. Problem: Pain:  Goal: Control of acute pain  Description: Control of acute pain  Outcome: Ongoing  Note: Intermittent complaints of surgical pain - epidural remains in place. Problem: Pain:  Goal: Control of chronic pain  Description: Control of chronic pain  Outcome: Ongoing  Note: No chronic pain complaints. Care plan reviewed with patient. Patient verbalizes understanding of the plan of care and contribute to goal setting.

## 2020-10-02 NOTE — PLAN OF CARE
Problem: Falls - Risk of:  Goal: Will remain free from falls  Description: Will remain free from falls  10/1/2020 2329 by Mackenzie Zimmerman  Outcome: Met This Shift  Note: Pt. Free of falls this shift. Bed alarm on, call light within reach, personal belongings within reach, and hourly rounding completed. Problem: Falls - Risk of:  Goal: Absence of physical injury  Description: Absence of physical injury  10/1/2020 2329 by Mackenzie Zimmerman  Outcome: Met This Shift  Note: No physical injury noted this shift. Problem: Pain:  Goal: Pain level will decrease  Description: Pain level will decrease  10/1/2020 2329 by Mackenzie Zimmerman  Outcome: Ongoing  Note: Pt. Pain controlled this shift. Care plan reviewed with patient. Patient verbalize understanding of the plan of care and contribute to goal setting.

## 2020-10-02 NOTE — PROGRESS NOTES
1920: Bedside shift report received from [address] [facility] Nate, RN at this time. 2030: Full assessment completed at this time. See FlowSheet data for details. Patient requiring 2L nasal canula at this time. Patient SpO2 >93% when awake, but dropping to 88-89% while asleep. 2130: Patient refused a full bath at this time. Bartlett cleansed with soap and water. 2155Isajalyn Mae RN (OB RN) at bedside to hang a new epidural bag.

## 2020-10-03 ENCOUNTER — APPOINTMENT (OUTPATIENT)
Dept: GENERAL RADIOLOGY | Age: 60
DRG: 120 | End: 2020-10-03
Attending: THORACIC SURGERY (CARDIOTHORACIC VASCULAR SURGERY)
Payer: COMMERCIAL

## 2020-10-03 LAB
ANION GAP SERPL CALCULATED.3IONS-SCNC: 10 MEQ/L (ref 8–16)
BASOPHILS # BLD: 0.7 %
BASOPHILS ABSOLUTE: 0 THOU/MM3 (ref 0–0.1)
BUN BLDV-MCNC: 11 MG/DL (ref 7–22)
CALCIUM SERPL-MCNC: 9.3 MG/DL (ref 8.5–10.5)
CHLORIDE BLD-SCNC: 100 MEQ/L (ref 98–111)
CO2: 24 MEQ/L (ref 23–33)
CREAT SERPL-MCNC: 0.6 MG/DL (ref 0.4–1.2)
EOSINOPHIL # BLD: 6.3 %
EOSINOPHILS ABSOLUTE: 0.4 THOU/MM3 (ref 0–0.4)
ERYTHROCYTE [DISTWIDTH] IN BLOOD BY AUTOMATED COUNT: 14.1 % (ref 11.5–14.5)
ERYTHROCYTE [DISTWIDTH] IN BLOOD BY AUTOMATED COUNT: 47.9 FL (ref 35–45)
GFR SERPL CREATININE-BSD FRML MDRD: > 90 ML/MIN/1.73M2
GLUCOSE BLD-MCNC: 154 MG/DL (ref 70–108)
GLUCOSE BLD-MCNC: 175 MG/DL (ref 70–108)
GLUCOSE BLD-MCNC: 180 MG/DL (ref 70–108)
GLUCOSE BLD-MCNC: 195 MG/DL (ref 70–108)
GLUCOSE BLD-MCNC: 197 MG/DL (ref 70–108)
GLUCOSE BLD-MCNC: 212 MG/DL (ref 70–108)
GLUCOSE BLD-MCNC: 238 MG/DL (ref 70–108)
HCT VFR BLD CALC: 41.9 % (ref 42–52)
HEMOGLOBIN: 13.4 GM/DL (ref 14–18)
IMMATURE GRANS (ABS): 0.42 THOU/MM3 (ref 0–0.07)
IMMATURE GRANULOCYTES: 7 %
LYMPHOCYTES # BLD: 9.3 %
LYMPHOCYTES ABSOLUTE: 0.6 THOU/MM3 (ref 1–4.8)
MCH RBC QN AUTO: 29.7 PG (ref 26–33)
MCHC RBC AUTO-ENTMCNC: 32 GM/DL (ref 32.2–35.5)
MCV RBC AUTO: 92.9 FL (ref 80–94)
MONOCYTES # BLD: 8.6 %
MONOCYTES ABSOLUTE: 0.5 THOU/MM3 (ref 0.4–1.3)
NUCLEATED RED BLOOD CELLS: 0 /100 WBC
PLATELET # BLD: 158 THOU/MM3 (ref 130–400)
PMV BLD AUTO: 10.4 FL (ref 9.4–12.4)
POTASSIUM SERPL-SCNC: 4.5 MEQ/L (ref 3.5–5.2)
RBC # BLD: 4.51 MILL/MM3 (ref 4.7–6.1)
SEG NEUTROPHILS: 68.1 %
SEGMENTED NEUTROPHILS ABSOLUTE COUNT: 4.1 THOU/MM3 (ref 1.8–7.7)
SODIUM BLD-SCNC: 134 MEQ/L (ref 135–145)
WBC # BLD: 6 THOU/MM3 (ref 4.8–10.8)

## 2020-10-03 PROCEDURE — 71045 X-RAY EXAM CHEST 1 VIEW: CPT

## 2020-10-03 PROCEDURE — 2700000000 HC OXYGEN THERAPY PER DAY

## 2020-10-03 PROCEDURE — 94761 N-INVAS EAR/PLS OXIMETRY MLT: CPT

## 2020-10-03 PROCEDURE — 0WP9X0Z REMOVAL OF DRAINAGE DEVICE FROM RIGHT PLEURAL CAVITY, EXTERNAL APPROACH: ICD-10-PCS | Performed by: INTERNAL MEDICINE

## 2020-10-03 PROCEDURE — 2500000003 HC RX 250 WO HCPCS: Performed by: ANESTHESIOLOGY

## 2020-10-03 PROCEDURE — 6370000000 HC RX 637 (ALT 250 FOR IP): Performed by: INTERNAL MEDICINE

## 2020-10-03 PROCEDURE — 2580000003 HC RX 258: Performed by: PHYSICIAN ASSISTANT

## 2020-10-03 PROCEDURE — 36415 COLL VENOUS BLD VENIPUNCTURE: CPT

## 2020-10-03 PROCEDURE — 99233 SBSQ HOSP IP/OBS HIGH 50: CPT | Performed by: INTERNAL MEDICINE

## 2020-10-03 PROCEDURE — 2060000000 HC ICU INTERMEDIATE R&B

## 2020-10-03 PROCEDURE — 6370000000 HC RX 637 (ALT 250 FOR IP): Performed by: PHYSICIAN ASSISTANT

## 2020-10-03 PROCEDURE — 94669 MECHANICAL CHEST WALL OSCILL: CPT

## 2020-10-03 PROCEDURE — 2500000003 HC RX 250 WO HCPCS: Performed by: NURSE PRACTITIONER

## 2020-10-03 PROCEDURE — 82948 REAGENT STRIP/BLOOD GLUCOSE: CPT

## 2020-10-03 PROCEDURE — 85025 COMPLETE CBC W/AUTO DIFF WBC: CPT

## 2020-10-03 PROCEDURE — 6370000000 HC RX 637 (ALT 250 FOR IP): Performed by: STUDENT IN AN ORGANIZED HEALTH CARE EDUCATION/TRAINING PROGRAM

## 2020-10-03 PROCEDURE — 80048 BASIC METABOLIC PNL TOTAL CA: CPT

## 2020-10-03 RX ADMIN — ENALAPRILAT 0.62 MG: 2.5 INJECTION INTRAVENOUS at 08:29

## 2020-10-03 RX ADMIN — Medication 10 ML: at 19:53

## 2020-10-03 RX ADMIN — METOPROLOL TARTRATE 25 MG: 25 TABLET ORAL at 08:28

## 2020-10-03 RX ADMIN — TRAZODONE HYDROCHLORIDE 50 MG: 50 TABLET ORAL at 19:53

## 2020-10-03 RX ADMIN — METOPROLOL TARTRATE 25 MG: 25 TABLET ORAL at 19:52

## 2020-10-03 RX ADMIN — INSULIN LISPRO 1 UNITS: 100 INJECTION, SOLUTION INTRAVENOUS; SUBCUTANEOUS at 19:52

## 2020-10-03 RX ADMIN — ENALAPRILAT 0.62 MG: 2.5 INJECTION INTRAVENOUS at 03:57

## 2020-10-03 RX ADMIN — Medication 8 ML/HR: at 05:40

## 2020-10-03 ASSESSMENT — PAIN SCALES - GENERAL
PAINLEVEL_OUTOF10: 2
PAINLEVEL_OUTOF10: 1
PAINLEVEL_OUTOF10: 2
PAINLEVEL_OUTOF10: 1

## 2020-10-03 NOTE — PLAN OF CARE
Problem: Falls - Risk of:  Goal: Will remain free from falls  Description: Will remain free from falls  10/2/2020 2205 by Mackenzie Zimmerman  Outcome: Met This Shift  Note: Pt. Free of falls this shift. Bed alarm on, call light within reach, personal belongings within reach, and hourly rounding completed. Problem: Falls - Risk of:  Goal: Absence of physical injury  Description: Absence of physical injury  10/2/2020 2205 by Mackenzie Zimmerman  Outcome: Met This Shift  Note: No physical injury noted this shift. Problem: Pain:  Goal: Pain level will decrease  Description: Pain level will decrease  10/2/2020 2205 by Mackenzie Zimmerman  Outcome: Met This Shift  Note: Pt. Not complaining of pain this shift. Pt. Repositioned and bathed with no increase in pain. Problem: Pain:  Goal: Control of acute pain  Description: Control of acute pain  10/2/2020 2205 by Mackenzie Zimmerman  Outcome: Met This Shift  Note: Pt. Not complaining of pain this shift. Pt. Repositioned and bathed with no increase in pain. Problem: Pain:  Goal: Control of chronic pain  Description: Control of chronic pain  10/2/2020 2205 by Mackenzie Zimmerman  Outcome: Met This Shift  Note: Pt. Not complaining of pain this shift. Pt. Repositioned and bathed with no increase in pain. Problem: Urinary Elimination:  Goal: Signs and symptoms of infection will decrease  Description: Signs and symptoms of infection will decrease  Outcome: Met This Shift  Note: No signs of urinary infection this shift. Pt. Is producing adequate urine output and remained afebrile this shift. Bartlett cleansed with soap and water. Problem: Urinary Elimination:  Goal: Complications related to the disease process, condition or treatment will be avoided or minimized  Description: Complications related to the disease process, condition or treatment will be avoided or minimized  Outcome: Met This Shift  Note: Complications related to the disease process were avoided this shift.         Care plan reviewed with patient. Patient verbalizes understanding of the plan of care and contribute to goal setting.

## 2020-10-03 NOTE — PROGRESS NOTES
CRITICAL CARE PROGRESS NOTE      Patient:  Juan José Lopez    Unit/Bed:4D-14/014-A  YOB: 1960  MRN: [id number]   PCP: Ruma Sanchez MD, MD  Date of Admission: 9/30/2020  Chief Complaint:- Chest Pain POD#3    Assessment and Plan:    1. Acute pulmonary insufficiency following surgery, hypoxia: Patient is currently on 2 L oxygen/nasal cannula. He has been satting well between 94-96%. Will continue titration to maintain SaO2 greater than 92%. Daily CXR revealed no focal infiltrate or mass, stable postsurgical changes of the right chest.  2 chest tubes, placement satisfactory. Patient has remained afebrile. Consistent incentive spirometry and Acapella utilization. 2. Lung cancer, right upper lobe adenocarcinoma:  POD#3 s/p bronchoscopy, epidural catheter, right thoracotomy, right upper lobectomy and lymph node dissection. Mild pleural effusion. Patient does have 2 large-bore chest tubes to suction which had produced 290cc over 24hrs. Pain control with fentanyl via epidural catheter. Dr. Licha Borrero,  surgery to follow. He is planning on removal of the epidural catheter and chest tubes today with transfer out of ICU. 2 view CXR ordered for tomorrow am.   3. DMT2, uncontrolled:  A1c 9/28/2020 6.6, patient did receive dexamethasone intraoperatively likely contributing. ACCU and medium SSI began yesterday for glucose correction. Most recent glucose 180.   4. Nonobstructive CAD: History of [facility]-2019, preoperative echo completed on 9/2020 LVEF 45%. Aspirin is on hold. Patient has mild pleuritic right-sided chest pain secondary to surgery and is successfully reduced with his epidural.  5. HFrEF, chronic: Echo 9/2020 LVEF 45%, stable, will continue to monitor. 6. Non-Hodgkin's lymphoma: History, status post 6 cycles of chemotherapy with last treatment in May 2020. PET scan May 2020 no evidence of other malignancy. Established patient of Dr. Trish Walter. We will continue to monitor. 7. GERD: history, monitor. Patient not complaining of reflux or burning cp and not on any home medications. 8. Insomnia, chronic: Trazodone qhs, controlled. 9. Ascending aorta dilitation: Noted to be mildly dilated on ECHO 9/2020 will continue to monitor. 10. Morbid Obesity :  BMI 45.41      INITIAL H AND P AND ICU COURSE:    Max Catha Schlatter is a 47-year-old  male admitted to [address] 9/30/2020 status post right upper lobectomy and tumor removal.     Patient has a past medical history that is significant for lifetime non-smoker, nonobstructive CAD-[facility] 2019 no significant coronary artery disease noted, normal LV systolic function, ECHO 3/2097 LVEF 45%, cardiac Lexiscan stress test-9/20/2020- for ischemia, Ascending aorta is mildly dilated, Adenocarcinoma of the right upper lobe of lung-established patient of Dr. Chiquita Fall, non-Hodgkin's lymphoma-status post 6 cycles of chemotherapy with the last treatment finished in May 2020, GERD, DMT2.     Preoperative work-up: Pulmonary function testing; FEV1 is 3.8 L, 105% of predicted value.  PET scan shows elevated SUV in the right upper lobe lung lesion without evidence of distant metastasis. History of pulmonary mass which he did undergo biopsy by CT guidance. Surgical pathology confirmed an adenocarcinoma most consistent with a lung primary.  On a PET/CT scan in May there was no evidence of other sites of malignancy. Juris Lala was no mediastinal or hilar lymphadenopathy identified.      9/30/2020 under the care of Dr. Tello Sessions, patient did undergo bronchoscopy, epidural catheter placement, right thoracotomy right upper lobectomy and lymph node dissection. Lung cancer located in right upper lobe anterior segment. No pleural effusion. No adhesion in the pleural space. The tumor did not appear to penetrate the visceral pleural.  Chest tube x2 to suction. Patient was extubated in the operating room.   Patient was admitted to the ICU for further evaluation and care while receiving analgesia via an epidural catheter.         Past Medical History: See HPI. Family History: Mother Marko Stark -coronary disease, CVA. Social History: Lifetime non-smoker denies any alcohol or illicit drug use.      ROS   GENERAL: Fatigue and weakness improving, denies any fever, chills. SKN: No lesions or rashes. HEAD: No headaches or recent injury  EYES: No acute changes in vision, no diplopia or blurred vision, no drainage  EARS: No hearing loss, no tinnitus no drainage  NOSE/THROAT: No rhinorrhea or pharyngitis, no nasal drainage  NECK: No lumps or unusual neck stiffness  PULMONARY: Negative for cough, orthopnea or paroxysmal nocturnal dyspnea, stridor or wheezing  CARDIAC: Positive for right-sided chest pain with deep inspiration, denies any chest pressure or heaviness. GI: Denies any abdominal pain no history melena or hematochezia, no diarrhea or constipation  : No hematuria no suprapubic pressure no CVA tenderness  PERIPHERAL VASCULAR: No intermittent claudication or unusual leg cramps  MUSCULOSKELETAL: Occasional arthralgias, myalgias  NEUROLOGICAL: No seizures or Syncope  HEMATOLOGIC:  No unusual bruising or bleeding      Scheduled Meds:   metoprolol tartrate  25 mg Oral BID    sodium chloride flush  10 mL Intravenous 2 times per day    traZODone  50 mg Oral Nightly    sodium chloride flush  10 mL Intravenous 2 times per day    insulin lispro  0-12 Units Subcutaneous Q4H     Continuous Infusions:   fentaNYL 750 mcg, ropivacaine 0.1% in sodium chloride 0.9% 265mL 8 mL/hr (10/03/20 0540)    dextrose      0.45 % NaCl with KCl 20 mEq 50 mL/hr at 10/02/20 4366       PHYSICAL EXAMINATION:  T: 97.7 P: 69. RR:  14. B/P: 135/76 FiO2:  30% O2 Sat: 94.  I/O:  -306  Body mass index is 42.61 kg/m². GCS:   15  General:   Morbidly obese male, pale, no acute distress  HEENT:  normocephalic and atraumatic. No scleral icterus. PERRL  Neck: supple. No Thyromegaly.   Lungs: Bilateral basilar rales and diffuse end expiratory wheezes. No retractions or increased work of breathing. Cardiac: RRR. Clear s1s2. No JVD. Abdomen: soft. Nontender, no acute distension  Extremities:  No clubbing, cyanosis, or edema x 4. Vasculature: capillary refill < 3 seconds. Palpable dorsalis pedis pulses. Skin:  warm and dry. Psych:  Alert and oriented x3. Affect appropriate  Lymph:  No supraclavicular adenopathy. Neurologic:  No focal deficit. No seizures. Data: (All radiographs, tracings, PFTs, and imaging are personally viewed and interpreted unless otherwise noted). Oxygen Delivery - O2 Flow Rate (L/min): 2 L/min      CENTRAL LINES/CHEMOPORT/TUNNEL CATH:-    [x] No   [] Yes           If yes -    [] Right IJ   [] Left IJ [] Right Femoral [] Left Femoral     [] Right Subclavian [] Left Subclavian  [x] Peripheral IV access right forearm  [x] Arterial Line (Left Radial)  Epidural in place for pain control. COFFEY CATHETER:-   [] No  [x] Yes   (20)      Radiology:(All radiographs, tracings, PFTs, and imaging are personally viewed and interpreted unless otherwise noted). Portable CXR 10/3/20-no focal consolidation or mass, right volume changes consistent with operative course. Noncontrast CT chest 10/2/2020: Small right hydropneumothorax, multifocal atelectasis without suspected infiltrate. EK20, NSR with rightward axis, rate 89, pr 198, qrs 108, qt 364. Labs: White count 6, hemoglobin 13.4, platelets 448. Sodium 134, potassium 4.5, chloride 100, bicarb 24, BUN 11, creatinine 0.6, glucose 212. CODE STATUS:-  [x] Full resuscitation [] DNR-Comfort Care-Arrest  [] DNR-Comfort Care      Seen with multidisciplinary ICU team.  Meets Continued ICU Level Care Criteria:    [] Yes   [x] No - Transfer Planned to listed location: 4k  [x] HOSPITALIST CONTACTED- DR Francois Berry    Case and plan discussed with Dr. Kacy Hernández.         Electronically signed by Laureano Cheema DO

## 2020-10-03 NOTE — PROGRESS NOTES
Patient assisted to sit at side of bed. No complaints of numbness or tingling. Patient assisted to stand at bedside and marched in place. Ambulated to chair with a steady gait, positioned for comfort. Dressings intact on back.

## 2020-10-03 NOTE — PROGRESS NOTES
1905: Bedside shift report received from MIKE CHAPA, RN at this time. Patient pulled up in bed via hercules sheet. Patient refused any other repositioning at this time. Patient states he has little to no pain at this time. Patient is awake and watching television. 2000: Full assessment completed at this time. Patient unwilling to turn at this time. Patient unwilling to have pillows put under his feet at this time. Educated patient on the importance of turning and rotating weight to prevent bedsores and skin breakdown. Will continue to encourage patient to turn more frequently. See FlowSheet data for assessment details. 2100: Patient bathed at this time. Full bath, back rub, lotion, bernard care, gown change, powder, and linen change completed at this time. Patient repositioned to the left side with arms elevated on pillows. Patient refuses pillows under his legs at this time. Patient refused turning the rest of this shift.

## 2020-10-03 NOTE — PROGRESS NOTES
Nabil Heard is a status post right upper and middle lobe lobectomy for lung cancer. Postop day 3. He is progressing well. Physical examination shows regular heart sounds and clear breathing sounds bilaterally. Chest tube drainage is minimal.  No air leak. Both chest tubes were removed today. Epidural catheter was also removed. Assessment: Progressing well. Plan: Transfer to stepdown unit. Oral analgesics. DC IV fluid. And daily chest x-ray. And continue incentive spirometry.

## 2020-10-04 ENCOUNTER — APPOINTMENT (OUTPATIENT)
Dept: GENERAL RADIOLOGY | Age: 60
DRG: 120 | End: 2020-10-04
Attending: THORACIC SURGERY (CARDIOTHORACIC VASCULAR SURGERY)
Payer: COMMERCIAL

## 2020-10-04 LAB
GLUCOSE BLD-MCNC: 185 MG/DL (ref 70–108)
GLUCOSE BLD-MCNC: 195 MG/DL (ref 70–108)
GLUCOSE BLD-MCNC: 199 MG/DL (ref 70–108)
GLUCOSE BLD-MCNC: 221 MG/DL (ref 70–108)

## 2020-10-04 PROCEDURE — 6370000000 HC RX 637 (ALT 250 FOR IP): Performed by: PHYSICIAN ASSISTANT

## 2020-10-04 PROCEDURE — 82948 REAGENT STRIP/BLOOD GLUCOSE: CPT

## 2020-10-04 PROCEDURE — 6370000000 HC RX 637 (ALT 250 FOR IP): Performed by: THORACIC SURGERY (CARDIOTHORACIC VASCULAR SURGERY)

## 2020-10-04 PROCEDURE — 94669 MECHANICAL CHEST WALL OSCILL: CPT

## 2020-10-04 PROCEDURE — 94760 N-INVAS EAR/PLS OXIMETRY 1: CPT

## 2020-10-04 PROCEDURE — 2060000000 HC ICU INTERMEDIATE R&B

## 2020-10-04 PROCEDURE — 6370000000 HC RX 637 (ALT 250 FOR IP): Performed by: STUDENT IN AN ORGANIZED HEALTH CARE EDUCATION/TRAINING PROGRAM

## 2020-10-04 PROCEDURE — 99232 SBSQ HOSP IP/OBS MODERATE 35: CPT | Performed by: INTERNAL MEDICINE

## 2020-10-04 PROCEDURE — 71046 X-RAY EXAM CHEST 2 VIEWS: CPT

## 2020-10-04 PROCEDURE — 6370000000 HC RX 637 (ALT 250 FOR IP): Performed by: INTERNAL MEDICINE

## 2020-10-04 PROCEDURE — 2580000003 HC RX 258: Performed by: PHYSICIAN ASSISTANT

## 2020-10-04 RX ORDER — SENNA PLUS 8.6 MG/1
1 TABLET ORAL NIGHTLY
Status: DISCONTINUED | OUTPATIENT
Start: 2020-10-04 | End: 2020-10-05 | Stop reason: HOSPADM

## 2020-10-04 RX ORDER — INSULIN GLARGINE 100 [IU]/ML
10 INJECTION, SOLUTION SUBCUTANEOUS NIGHTLY
Status: DISCONTINUED | OUTPATIENT
Start: 2020-10-04 | End: 2020-10-05

## 2020-10-04 RX ORDER — POLYETHYLENE GLYCOL 3350 17 G/17G
17 POWDER, FOR SOLUTION ORAL DAILY PRN
Status: DISCONTINUED | OUTPATIENT
Start: 2020-10-04 | End: 2020-10-05 | Stop reason: HOSPADM

## 2020-10-04 RX ORDER — DOCUSATE SODIUM 100 MG/1
100 CAPSULE, LIQUID FILLED ORAL 2 TIMES DAILY
Status: DISCONTINUED | OUTPATIENT
Start: 2020-10-04 | End: 2020-10-05 | Stop reason: HOSPADM

## 2020-10-04 RX ADMIN — Medication 10 ML: at 09:50

## 2020-10-04 RX ADMIN — METOPROLOL TARTRATE 25 MG: 25 TABLET ORAL at 08:50

## 2020-10-04 RX ADMIN — OXYCODONE HYDROCHLORIDE AND ACETAMINOPHEN 1 TABLET: 5; 325 TABLET ORAL at 03:42

## 2020-10-04 RX ADMIN — TRAZODONE HYDROCHLORIDE 50 MG: 50 TABLET ORAL at 21:09

## 2020-10-04 RX ADMIN — OXYCODONE HYDROCHLORIDE AND ACETAMINOPHEN 1 TABLET: 5; 325 TABLET ORAL at 08:53

## 2020-10-04 RX ADMIN — INSULIN GLARGINE 10 UNITS: 100 INJECTION, SOLUTION SUBCUTANEOUS at 21:09

## 2020-10-04 RX ADMIN — Medication 10 ML: at 21:09

## 2020-10-04 RX ADMIN — METOPROLOL TARTRATE 25 MG: 25 TABLET ORAL at 21:09

## 2020-10-04 RX ADMIN — OXYCODONE HYDROCHLORIDE AND ACETAMINOPHEN 1 TABLET: 5; 325 TABLET ORAL at 18:35

## 2020-10-04 RX ADMIN — OXYCODONE HYDROCHLORIDE AND ACETAMINOPHEN 1 TABLET: 5; 325 TABLET ORAL at 13:48

## 2020-10-04 RX ADMIN — INSULIN LISPRO 1 UNITS: 100 INJECTION, SOLUTION INTRAVENOUS; SUBCUTANEOUS at 21:09

## 2020-10-04 RX ADMIN — POLYETHYLENE GLYCOL 3350 17 G: 17 POWDER, FOR SOLUTION ORAL at 09:50

## 2020-10-04 RX ADMIN — DOCUSATE SODIUM 100 MG: 100 CAPSULE, LIQUID FILLED ORAL at 09:50

## 2020-10-04 RX ADMIN — OXYCODONE HYDROCHLORIDE AND ACETAMINOPHEN 1 TABLET: 5; 325 TABLET ORAL at 23:31

## 2020-10-04 ASSESSMENT — PAIN DESCRIPTION - FREQUENCY: FREQUENCY: INTERMITTENT

## 2020-10-04 ASSESSMENT — PAIN SCALES - GENERAL
PAINLEVEL_OUTOF10: 0
PAINLEVEL_OUTOF10: 4
PAINLEVEL_OUTOF10: 3
PAINLEVEL_OUTOF10: 4

## 2020-10-04 ASSESSMENT — PAIN DESCRIPTION - DESCRIPTORS: DESCRIPTORS: SHARP

## 2020-10-04 ASSESSMENT — PAIN DESCRIPTION - PAIN TYPE: TYPE: SURGICAL PAIN

## 2020-10-04 ASSESSMENT — PAIN DESCRIPTION - ONSET: ONSET: ON-GOING

## 2020-10-04 ASSESSMENT — PAIN DESCRIPTION - LOCATION: LOCATION: BACK

## 2020-10-04 ASSESSMENT — PAIN DESCRIPTION - ORIENTATION: ORIENTATION: RIGHT

## 2020-10-04 NOTE — PROGRESS NOTES
Hospitalist Progress Note    Patient:  Avel Hernandez      Unit/Bed:4K-25/025-A    YOB: 1960    MRN: [id number]       Acct: [de-identified]     PCP: Xin Garland MD, MD    Date of Admission: 9/30/2020      Assessment/Plan:  Active Hospital Problems    Diagnosis Date Noted    Acute postoperative pulmonary insufficiency ([facility] [J95.2] 10/01/2020    History of lung cancer [Z85.118] 09/30/2020    Adenocarcinoma, lung, right ([facility] [C34.91] 08/15/2020       1. Acute pulmonary insufficiency following surgery, hypoxia: resolved, continue IS/Acapella. 2. Lung Adenocarcinoma, right upper lobe:  S/p bronchoscopy, epidural catheter, right thoracotomy, right upper lobectomy and lymph node dissection 9/30 with Dr. Barron. S/p 2 large-bore chest tubes and epidural catheter removed on 10/3.   1. Dr. Quintin Villanueva, CV surgery to follow. 2. 2 view CXR ordered this am is without pneumothorax  3. To follow Dr. Anna Boykin as outpatient  3. Constipation: add on senna, colace and konosul. Passing flatus. 4. DMT2, uncontrolled:  A1c 9/28/2020 6.6, patient did receive dexamethasone intraoperatively likely contributing.   1. ACCU and medium SSI begun  2. Add on basal coverage with lantus 10u  5. Nonobstructive CAD: History of [facility]-2019, preoperative echo completed on 9/2020 LVEF 45%.    1. Aspirin is on hold. Resume as per [facility]   2. BB added on  3. Consider statin? 6. HFrEF, chronic: Echo 9/2020 LVEF 45%, stable, will continue to monitor. Euvolemic. 7. Non-Hodgkin's lymphoma: History, follows with Dr. Anna Boykin. Has undergone chemotherapy. 8. Insomnia, chronic: Trazodone qhs, controlled. 9. Morbid Obesity :  BMI 45.41    Expected discharge date:  1d    Disposition: pending - PT/OT ordered.           [] Home       [] TCU       [] Rehab       [] Psych       [] SNF       [] [place]       [] Other-    --------------------------------------------    Chief Complaint: Surgical procedure    Hospital Course: External nares normal.  Oral mucosa moist without lesions. Hearing grossly intact. Neck: Supple, with full range of motion. No jugular venous distention. Trachea midline. Respiratory:  Normal respiratory effort. Clear to auscultation, bilaterally without rales or wheezes or Rhonchi. Bandages noted in the right lung. Cardiovascular: Normal rate, regular rhythm with normal S1/S2 without murmurs. No lower extremity edema. Abdomen: Soft, non-tender, non-distended with normal bowel sounds. Musculoskeletal: Normal range of motion in extremities. There is no joint swelling or tenderness. Skin: Skin color, texture, turgor normal.  No rashes or lesions. Neurologic:  Neurovascularly intact without any focal sensory/motor deficits in the upper and lower extremities. Cranial nerves:  grossly non-focal.  Psychiatric: Alert and oriented, thought content appropriate, normal insight. Capillary Refill: Brisk,< 3 seconds. Peripheral Pulses: +2 palpable, equal bilaterally. Labs:   Recent Labs     10/02/20  1156 10/03/20  0926   WBC 5.9 6.0   HGB 13.4* 13.4*   HCT 42.2 41.9*    158     Recent Labs     10/02/20  1156 10/03/20  0926   * 134*   K 4.5 4.5    100   CO2 25 24   BUN 10 11   CREATININE 0.6 0.6   CALCIUM 9.0 9.3     No results for input(s): AST, ALT, BILIDIR, BILITOT, ALKPHOS in the last 72 hours. No results for input(s): INR in the last 72 hours. No results for input(s): Lee Hug in the last 72 hours. Microbiology:      Urinalysis:      Lab Results   Component Value Date    NITRU NEGATIVE 09/28/2020    BLOODU NEGATIVE 09/28/2020    SPECGRAV 1.022 09/28/2020       Radiology:  XR CHEST (2 VW)   Final Result   Impression:   1. Right chest tubes removed. No pneumothorax. This document has been electronically signed by: Betty Pierce MD on 10/04/2020 06:52 AM         XR CHEST PORTABLE   Final Result   No focal infiltrate or mass.    Stable volume loss right hemithorax, related to prior surgery. Chest tubes satisfactory. This document has been electronically signed by: Umu Titus MD on    10/03/2020 07:21 AM         CT CHEST WO CONTRAST   Final Result   Impression:   Small right hydropneumothorax. Right chest tube positions as described. Multifocal atelectasis without suspected infiltrate. This document has been electronically signed by: Umu Titus MD on    10/02/2020 07:02 AM      All CT scans at this facility use dose modulation, iterative    reconstruction, and/or weight-based   dosing when appropriate to reduce radiation dose to as low as reasonably    achievable. XR CHEST PORTABLE   Final Result   Increased right infrahilar and left perihilar atelectasis or infiltrates. Similar pleural effusions. This document has been electronically signed by: Dana Sharpe MD on    10/02/2020 04:05 AM         XR CHEST PORTABLE   Final Result   Impression:      Stable bilateral basilar atelectasis and small pleural effusions. This document has been electronically signed by: Kirk Milton MD on    10/01/2020 04:55 AM         XR CHEST PORTABLE   Final Result   1. There is volume loss within the right hemithorax likely related to postsurgical change. Two large bore right-sided chest tubes are seen. No pneumothorax is seen at this time. **This report has been created using voice recognition software. It may contain minor errors which are inherent in voice recognition technology. **      Final report electronically signed by Dr. Yue Montana on 9/30/2020 4:07 PM          DVT prophylaxis: [] Lovenox                                  [x] SCDs                                 [] SQ Heparin                                 [] Encourage ambulation           [] Already on Anticoagulation     Code Status: Full Code    PT/OT Eval Status: yes    Diet:   DIET DENTAL SOFT; Carb Control: 4 carb choices (60 gms)/meal    Fluids: stopped    Tele:   [x] yes             [] no      Electronically signed by Rachel López DO on 10/4/2020 at 8:16 AM

## 2020-10-04 NOTE — PLAN OF CARE
Abnormal:  Goal: Ability to maintain appropriate glucose levels has stabilized  Description: Ability to maintain appropriate glucose levels has stabilized  Outcome: Ongoing  Note: Pt is a chem ACHS and has insulin coverage ordered. Problem: Discharge Planning:  Goal: Discharged to appropriate level of care  Description: Discharged to appropriate level of care  Outcome: Ongoing  Note: Pt is from home with wife and plans on returning back there once medically stable. Care plan reviewed with patient. Patient verbalize understanding of the plan of care and contribute to goal setting.

## 2020-10-04 NOTE — PROGRESS NOTES
Nabil Sanders is resting comfortably in his bed. He was transferred to stepdown unit yesterday. He walked several times to the nursing station. He reports mild to moderate incisional pain, easily controlled with oral antibiotics. He has not had any bowel movements postoperatively yet. Incisions are healing well. Chest x-ray shows well-expanded lungs without infiltration. Assessment: Progressing well. Plan: Possible discharge home tomorrow. CBC and BMP in a.m. continue intensive spirometry and ambulation.

## 2020-10-05 ENCOUNTER — APPOINTMENT (OUTPATIENT)
Dept: GENERAL RADIOLOGY | Age: 60
DRG: 120 | End: 2020-10-05
Attending: THORACIC SURGERY (CARDIOTHORACIC VASCULAR SURGERY)
Payer: COMMERCIAL

## 2020-10-05 VITALS
OXYGEN SATURATION: 93 % | BODY MASS INDEX: 42.37 KG/M2 | HEIGHT: 72 IN | RESPIRATION RATE: 14 BRPM | TEMPERATURE: 98.3 F | WEIGHT: 312.8 LBS | DIASTOLIC BLOOD PRESSURE: 76 MMHG | SYSTOLIC BLOOD PRESSURE: 135 MMHG | HEART RATE: 85 BPM

## 2020-10-05 PROBLEM — Z90.2 S/P LOBECTOMY OF LUNG: Status: ACTIVE | Noted: 2020-10-05

## 2020-10-05 LAB
ANION GAP SERPL CALCULATED.3IONS-SCNC: 10 MEQ/L (ref 8–16)
BUN BLDV-MCNC: 16 MG/DL (ref 7–22)
CALCIUM SERPL-MCNC: 9.5 MG/DL (ref 8.5–10.5)
CHLORIDE BLD-SCNC: 99 MEQ/L (ref 98–111)
CO2: 28 MEQ/L (ref 23–33)
CREAT SERPL-MCNC: 0.8 MG/DL (ref 0.4–1.2)
ERYTHROCYTE [DISTWIDTH] IN BLOOD BY AUTOMATED COUNT: 13.9 % (ref 11.5–14.5)
ERYTHROCYTE [DISTWIDTH] IN BLOOD BY AUTOMATED COUNT: 45.5 FL (ref 35–45)
GFR SERPL CREATININE-BSD FRML MDRD: > 90 ML/MIN/1.73M2
GLUCOSE BLD-MCNC: 201 MG/DL (ref 70–108)
GLUCOSE BLD-MCNC: 213 MG/DL (ref 70–108)
GLUCOSE BLD-MCNC: 217 MG/DL (ref 70–108)
HCT VFR BLD CALC: 42 % (ref 42–52)
HEMOGLOBIN: 13.8 GM/DL (ref 14–18)
MCH RBC QN AUTO: 29.7 PG (ref 26–33)
MCHC RBC AUTO-ENTMCNC: 32.9 GM/DL (ref 32.2–35.5)
MCV RBC AUTO: 90.3 FL (ref 80–94)
PLATELET # BLD: 234 THOU/MM3 (ref 130–400)
PMV BLD AUTO: 10 FL (ref 9.4–12.4)
POTASSIUM SERPL-SCNC: 4.2 MEQ/L (ref 3.5–5.2)
RBC # BLD: 4.65 MILL/MM3 (ref 4.7–6.1)
SODIUM BLD-SCNC: 137 MEQ/L (ref 135–145)
WBC # BLD: 6 THOU/MM3 (ref 4.8–10.8)

## 2020-10-05 PROCEDURE — 85027 COMPLETE CBC AUTOMATED: CPT

## 2020-10-05 PROCEDURE — 36415 COLL VENOUS BLD VENIPUNCTURE: CPT

## 2020-10-05 PROCEDURE — 71045 X-RAY EXAM CHEST 1 VIEW: CPT

## 2020-10-05 PROCEDURE — 94669 MECHANICAL CHEST WALL OSCILL: CPT

## 2020-10-05 PROCEDURE — 80048 BASIC METABOLIC PNL TOTAL CA: CPT

## 2020-10-05 PROCEDURE — 82948 REAGENT STRIP/BLOOD GLUCOSE: CPT

## 2020-10-05 PROCEDURE — 2580000003 HC RX 258: Performed by: PHYSICIAN ASSISTANT

## 2020-10-05 PROCEDURE — 6370000000 HC RX 637 (ALT 250 FOR IP): Performed by: STUDENT IN AN ORGANIZED HEALTH CARE EDUCATION/TRAINING PROGRAM

## 2020-10-05 PROCEDURE — 94760 N-INVAS EAR/PLS OXIMETRY 1: CPT

## 2020-10-05 PROCEDURE — APPSS60 APP SPLIT SHARED TIME 46-60 MINUTES: Performed by: PHYSICIAN ASSISTANT

## 2020-10-05 PROCEDURE — 99238 HOSP IP/OBS DSCHRG MGMT 30/<: CPT | Performed by: INTERNAL MEDICINE

## 2020-10-05 RX ORDER — OXYCODONE HYDROCHLORIDE AND ACETAMINOPHEN 5; 325 MG/1; MG/1
1 TABLET ORAL EVERY 6 HOURS PRN
Qty: 12 TABLET | Refills: 0 | Status: SHIPPED | OUTPATIENT
Start: 2020-10-05 | End: 2020-10-08

## 2020-10-05 RX ORDER — GLIPIZIDE 5 MG/1
5 TABLET ORAL
Status: DISCONTINUED | OUTPATIENT
Start: 2020-10-05 | End: 2020-10-05 | Stop reason: HOSPADM

## 2020-10-05 RX ORDER — PSEUDOEPHEDRINE HCL 30 MG
100 TABLET ORAL 2 TIMES DAILY
Qty: 60 CAPSULE | Refills: 0 | Status: SHIPPED | OUTPATIENT
Start: 2020-10-05 | End: 2022-06-01 | Stop reason: ALTCHOICE

## 2020-10-05 RX ORDER — POLYETHYLENE GLYCOL 3350 17 G/17G
17 POWDER, FOR SOLUTION ORAL DAILY PRN
Qty: 527 G | Refills: 1 | Status: SHIPPED | OUTPATIENT
Start: 2020-10-05 | End: 2020-11-04

## 2020-10-05 RX ORDER — SENNA PLUS 8.6 MG/1
1 TABLET ORAL DAILY PRN
Qty: 30 TABLET | Refills: 0 | Status: SHIPPED | OUTPATIENT
Start: 2020-10-05 | End: 2020-11-04

## 2020-10-05 RX ORDER — PROMETHAZINE HYDROCHLORIDE 25 MG/1
TABLET ORAL
Qty: 60 TABLET | Refills: 3 | Status: SHIPPED | OUTPATIENT
Start: 2020-10-05 | End: 2020-11-01 | Stop reason: SDUPTHER

## 2020-10-05 RX ADMIN — Medication 10 ML: at 08:40

## 2020-10-05 RX ADMIN — METOPROLOL TARTRATE 25 MG: 25 TABLET ORAL at 08:42

## 2020-10-05 ASSESSMENT — PAIN SCALES - GENERAL: PAINLEVEL_OUTOF10: 0

## 2020-10-05 NOTE — PROGRESS NOTES
CLINICAL PHARMACY: DISCHARGE MED RECONCILIATION/REVIEW    [facility] ([facility]) Select Patient?: No  Total # of Interventions Recommended: 0   -   Total # Interventions Accepted: 0  Intervention Severity:   - Level 1 Intervention Present?: No   - Level 2 #: 0   - Level 3 #: 0   Time Spent (min): 15    Additional Documentation:    Sima CristobalD, BCPS  10/5/2020  1:39 PM

## 2020-10-05 NOTE — FLOWSHEET NOTE
10/05/20 0935   Provider Notification   Reason for Communication Evaluate   Provider Name Joseluis Resides   Provider Notification Advance Practice Clinician (CNS, NP, CNM, CRNA, PA)   Method of Communication Secure Message   Notification Time [phone]   notifying that portable chest x-ray has resulted and clarifying that cardiothoracic team is still ok with discharge. [facility] cardiothoracic team is still ok with discharge.

## 2020-10-05 NOTE — CARE COORDINATION
10/5/20, 12:02 PM EDT    Met with Max; he plans home with spouse. Declines needs for [facility] or [facility]. Updated nurse Carmelo . Patient goals/plan/ treatment preferences discussed by  and . Patient goals/plan/ treatment preferences reviewed with patient/ family. Patient/ family verbalize understanding of discharge plan and are in agreement with goal/plan/treatment preferences. Understanding was demonstrated using the teach back method. AVS provided by RN at time of discharge, which includes all necessary medical information pertaining to the patients current course of illness, treatment, post-discharge goals of care, and treatment preferences.

## 2020-10-05 NOTE — PROGRESS NOTES
Discharge teaching and instructions for diagnosis/procedure of Right upper and mid lobectomy and right thoracotomy completed with patient using teachback method. AVS reviewed. Printed prescriptions given to patient. Patient voiced understanding regarding prescriptions, follow up appointments, and care of self at home. Discharged in a wheelchair to  home with support per cab.

## 2020-10-05 NOTE — PLAN OF CARE
Problem: Falls - Risk of:  Goal: Will remain free from falls  Description: Will remain free from falls  Outcome: Ongoing  Note: Patient free from falls. Call light in reach, bed alarm on, bed in lowest position. Rounding hourly. Goal: Absence of physical injury  Description: Absence of physical injury  Outcome: Ongoing  Note: No signs of physical injury. Problem: Pain:  Goal: Pain level will decrease  Description: Pain level will decrease  Outcome: Ongoing  Note: Pain Assessment: 0-10  Pain Level: 4   Patient's Stated Pain Goal: No pain   Is pain goal met at this time? Yes     Non-Pharmaceutical Pain Intervention(s): Relaxation techniques, Repositioned      Problem: Urinary Elimination:  Goal: Signs and symptoms of infection will decrease  Description: Signs and symptoms of infection will decrease  Outcome: Ongoing  Note: No signs of infection. Patient afebrile. Goal: Complications related to the disease process, condition or treatment will be avoided or minimized  Description: Complications related to the disease process, condition or treatment will be avoided or minimized  Outcome: Ongoing  Note: No complications at this time. Problem: Cardiovascular  Goal: Hemodynamic stability  Outcome: Ongoing  Note: Blood pressure WDL, MAP >60. HR is NSR. Problem: Skin Integrity/Risk  Goal: No skin breakdown during hospitalization  Outcome: Ongoing  Note: No skin breakdown. Encouraging to turn every 2 hours. Problem: Serum Glucose Level - Abnormal:  Goal: Ability to maintain appropriate glucose levels has stabilized  Description: Ability to maintain appropriate glucose levels has stabilized  Outcome: Ongoing  Note: Monitoring glucose levels ACHS. Insulin given per order. Problem: Discharge Planning:  Goal: Discharged to appropriate level of care  Description: Discharged to appropriate level of care  Outcome: Ongoing  Note: Patient is from home. Plan is to return at discharge.      Care plan reviewed with patient. Patient verbalize understanding of the plan of care and contribute to goal setting.

## 2020-10-05 NOTE — PROGRESS NOTES
[facility] THERAPY MISSED TREATMENT NOTE  [facility] ICU STEPDOWN TELEMETRY 4K  4K-25/025-A      Date: 10/5/2020  Patient Name: Lucho Atkinson        CSN: [id number]   : 1960  (56 y.o.)  Gender: male                REASON FOR MISSED TREATMENT: OT eval/tx orders received. Per RN, pt scheduled to discharge home later this date, and has had no difficulty with ADLs or functional mobility in room/hallways. Spoke with pt who declined need for OT services at this time and declined any concerns with discharge to home later this date.  Will defer eval.

## 2020-10-05 NOTE — ADT AUTH CERT
Utilization Reviews         Lobectomy, Lung - Care Day 5 (10/4/2020) by Araseli Baeza RN         Review Status  Review Entered    Completed  10/5/2020 14:36        Criteria Review       Care Day: 5 Care Date: 10/4/2020 Level of Care:    Guideline Day 5    Clinical Status    (X) * Hemodynamic stability    10/5/2020 2:36 PM EDT by Paulette Childers      stable    (X) * Tachypnea absent    10/5/2020 2:36 PM EDT by Paulette Childers      Resp 18    ( ) * Oxygenation status at baseline    (X) * No evidence of postoperative or surgical site infection    10/5/2020 2:36 PM EDT by Paulette Childers      not noted    (X) * Pain absent or managed    10/5/2020 2:36 PM EDT by Paulette Childers      pain in the right posterior chest which is taken down to 2/10 after oral pain meds.     ( ) * Discharge plans and education understood    Activity    ( ) * Ambulatory or acceptable for next level of care    Routes    ( ) * Oral hydration, medications, and diet    * Milestone    Additional Notes    10/4    Subjective (past 24 hours):  Patient seen at bedside, he is doing well, currently admits to pain in the right posterior chest which is taken down to 2/10 after oral pain meds.  No cough, SOB, hemoptysis or plueritic pain reported.         CXR this am showed no evidence of pneumothorax s/p lobectomy.              Infusions Meds    · fentaNYL 750 mcg, ropivacaine 0.1% in sodium chloride 0.9% 265mL Stopped (10/03/20 1105)    · dextrose    · 0.45 % NaCl with KCl 20 mEq Stopped (10/03/20 1105)       Scheduled Medications    · insulin lispro 0-12 Units Subcutaneous TID WC    · insulin lispro 0-6 Units Subcutaneous Nightly    · metoprolol tartrate 25 mg Oral BID    · sodium chloride flush 10 mL Intravenous 2 times per day    · traZODone 50 mg Oral Nightly    · sodium chloride flush 10 mL Intravenous 2 times per day            Intake/Output Summary (Last 24 hours) at 10/4/2020 0816    Last data filed at 10/4/2020 0337    Gross per 24 hour    Intake 517 ml    Output 825 ml    Net -308 ml         Weight change: -1 lb 5.7 oz (-0.615 kg)              Exam:    BP (!) 173/85   Pulse 95   Temp 98.5 °F (36.9 °C) (Oral)   Resp 18   Ht 6' (1.829 m)   Wt (!) 312 lb 12.8 oz (141.9 kg)   SpO2 95%   BMI 42.42 kg/m²         General appearance: No apparent distress, well developed, appears stated age. Mechele Jai    Eyes:  Pupils equal, round, and reactive to light. Conjunctivae/corneas clear. HENT: Head normal in appearance. External nares normal.  Oral mucosa moist without lesions.  Hearing grossly intact. Neck: Supple, with full range of motion. No jugular venous distention. Trachea midline. Respiratory:  Normal respiratory effort. Clear to auscultation, bilaterally without rales or wheezes or Rhonchi. Bandages noted in the right lung. Cardiovascular: Normal rate, regular rhythm with normal S1/S2 without murmurs.  No lower extremity edema. Abdomen: Soft, non-tender, non-distended with normal bowel sounds. Musculoskeletal: Normal range of motion in extremities.  There is no joint swelling or tenderness. Skin: Skin color, texture, turgor normal.  No rashes or lesions. Neurologic:  Neurovascularly intact without any focal sensory/motor deficits in the upper and lower extremities. Cranial nerves:  grossly non-focal.    Psychiatric: Alert and oriented, thought content appropriate, normal insight. Capillary Refill: Brisk,< 3 seconds. Peripheral Pulses: +2 palpable, equal bilaterally.         Assessment/Plan:    Active Hospital Problems      Diagnosis Date Noted    · Acute postoperative pulmonary insufficiency ([address] [J95.2] 10/01/2020    · History of lung cancer [Z85.118] 09/30/2020    · Adenocarcinoma, lung, right ([address] [C34.91] 08/15/2020              1.  Acute pulmonary insufficiency following surgery, hypoxia: resolved, continue IS/Acapella.      2. Lung Adenocarcinoma, right upper lobe:  S/p bronchoscopy, epidural catheter, right thoracotomy, right upper lobectomy and lymph node dissection 9/30 with Dr. Barron. S/p 2 large-bore chest tubes and epidural catheter removed on 10/3.     1. Dr. Tanya Gregory, CV surgery to follow. 2. 2 view CXR ordered this am is without pneumothorax    3. To follow Dr. Morgan Gutierrez as outpatient    3. Constipation: add on senna, colace and konosul.  Passing flatus. 4. DMT2, uncontrolled:  A1c 9/28/2020 6.6, patient did receive dexamethasone intraoperatively likely contributing.     1. ACCU and medium SSI begun    2. Add on basal coverage with lantus 10u    5. Nonobstructive CAD: History of [facility]-2019, preoperative echo completed on 9/2020 LVEF 45%.      1. Aspirin is on hold. Resume as per [facility]     2. BB added on    3. Consider statin? 6. HFrEF, chronic: Echo 9/2020 LVEF 45%, stable, will continue to monitor.  Euvolemic.      7. Non-Hodgkin's lymphoma: History, follows with Dr. Morgan Gutierrez. Ashley Costa undergone chemotherapy. 8. Insomnia, chronic: Trazodone qhs, controlled. 9. Morbid Obesity :  BMI 45.41         Cardiothoracic Surgery    Progress Notes    Nabil Sherman is resting comfortably in his bed. Anastasiia Holguin was transferred to stepdown unit yesterday. Anastasiia Holguin walked several times to the nursing station. Anastasiia Holguin reports mild to moderate incisional pain, easily controlled with oral antibiotics. Anastasiia Holguin has not had any bowel movements postoperatively yet.  Incisions are healing well.  Chest x-ray shows well-expanded lungs without infiltration.         Assessment: Progressing well.         Plan: Possible discharge home tomorrow. [address]  and BMP in a.m. continue intensive spirometry and ambulation.           POC Glucose  221High  mg/dl                Lobectomy, Lung - Care Day 4 (10/3/2020) by Jesenia Palmer RN         Review Status  Review Entered    Completed  10/5/2020 14:32        Criteria Review       Care Day: 4 Care Date: 10/3/2020 Level of Care:    Guideline Day 4    Clinical Status    (X) * No evidence of postoperative or surgical site infection cannula. He has been satting well between 94-96%. Will continue titration to maintain SaO2 greater than 92%. Daily CXR revealed no focal infiltrate or mass, stable postsurgical changes of the right chest.  2 chest tubes, placement satisfactory. Patient has remained afebrile.  Consistent incentive spirometry and Acapella utilization. 2. Lung cancer, right upper lobe adenocarcinoma:  POD#3 s/p bronchoscopy, epidural catheter, right thoracotomy, right upper lobectomy and lymph node dissection. Mild pleural effusion. Patient does have 2 large-bore chest tubes to suction which had produced 290cc over 24hrs. Pain control with fentanyl via epidural catheter. Dr. Janis Pinzon, CV surgery to follow. He is planning on removal of the epidural catheter and chest tubes today with transfer out of ICU. 2 view CXR ordered for tomorrow am.    3. DMT2, uncontrolled:  A1c 9/28/2020 6.6, patient did receive dexamethasone intraoperatively likely contributing. ACCU and medium SSI began yesterday for glucose correction. Most recent glucose 180.    4. Nonobstructive CAD: History of [facility]-2019, preoperative echo completed on 9/2020 LVEF 45%.  Aspirin is on hold.  Patient has mild pleuritic right-sided chest pain secondary to surgery and is successfully reduced with his epidural.    5. HFrEF, chronic: Echo 9/2020 LVEF 45%, stable, will continue to monitor. 6. Non-Hodgkin's lymphoma: History, status post 6 cycles of chemotherapy with last treatment in May 2020.  PET scan May 2020 no evidence of other malignancy. Established patient of Dr. Shireen Seo will continue to monitor. 7. GERD: history, monitor. Patient not complaining of reflux or burning cp and not on any home medications. 8. Insomnia, chronic: Trazodone qhs, controlled. 9. Ascending aorta dilitation: Noted to be mildly dilated on ECHO 9/2020 will continue to monitor.     10. Morbid Obesity :  BMI 45.41           WBC 6.0 thou/mm3      RBC 4.51 mill/mm3      Hemoglobin 13.4 gm/dl   Hematocrit 41.9 %      MCV 92.9 fL      MCH 29.7 pg      MCHC 32.0 gm/dl      RDW-CV 14.1 %      RDW-SD 47.9 fL      Platelets 074 thou/mm3      MPV 10.4 fL      Seg Neutrophils 68.1 %      Lymphocytes 9.3 %      Monocytes 8.6 %      Eosinophils 6.3 %      Basophils 0.7 %      Immature Granulocytes 7.0 %      Segs Absolute 4.1 thou/mm3      Lymphocytes Absolute 0.6 thou/mm3      Monocytes Absolute 0.5 thou/mm3      Eosinophils Absolute 0.4 thou/mm3      Basophils Absolute 0.0 thou/mm3      Immature Grans (Abs) 0.42 thou/mm3      nRBC 0 /100 wbc       Sodium 134 meq/L      Potassium 4.5 meq/L      Chloride 100 meq/L      CO2 24 meq/L      Glucose 212 mg/dL      BUN 11 mg/dL      CREATININE 0.6 mg/dL      Calcium 9.3 mg/dL                 Lobectomy, Lung - Care Day 3 (10/2/2020) by Doris Taylor RN         Review Status  Review Entered    Completed  10/2/2020 15:42        Criteria Review       Care Day: 3 Care Date: 10/2/2020 Level of Care:    Guideline Day 3    Level Of Care    (X) Telemetry or floor    10/2/2020 3:42 PM EDT by Charlie Macias      ICU    Clinical Status    ( ) * Pain absent or managed    ( ) * Respiratory distress absent    ( ) * CXR acceptable    Activity    ( ) * Increased ambulation    Interventions    ( ) * Chest tube absent    ( ) * Central lines absent    Medications    ( ) * Epidural analgesics absent    * Milestone    Additional Notes    10/2  ICU       PHYSICAL EXAMINATION:    T: 98.2 P: 102. RR:  18. B/P: 154/103 FiO2:  2L NC. O2 Sat: 94.  I/O:  Net -802    Body mass index is 44.58 kg/m². GCS:   15    General:   Morbidly obese male, pale, no acute distress    HEENT:  normocephalic and atraumatic.  No scleral icterus. PERRL    Neck: supple.  No Thyromegaly. Lungs: Bilateral basilar rales.  No retractions. Cardiac: RRR. Clear s1s2. No JVD.     Abdomen: soft.  Nontender, no acute distension    Extremities:  No clubbing, cyanosis, or edema x 4.      Vasculature: capillary refill < 3 seconds. Palpable dorsalis pedis pulses. Skin:  warm and dry. Psych:  Alert and oriented x3.  Affect appropriate    Lymph:  No supraclavicular adenopathy. Neurologic:  No focal deficit. No seizures.         Data: (All radiographs, tracings, PFTs, and imaging are personally viewed and interpreted unless otherwise noted). Oxygen Delivery - O2 Flow Rate (L/min): 2 L/min         Scheduled Medications    · enalaprilat 0.625 mg Intravenous Q6H    · sodium chloride flush 10 mL Intravenous 2 times per day    · traZODone 50 mg Oral Nightly    · sodium chloride flush 10 mL Intravenous 2 times per day    · insulin lispro 0-12 Units Subcutaneous Q4H       Assessment and Plan:      1. Acute pulmonary insufficiency following surgery, hypoxia: Patient is currently on 2 L oxygen/nasal cannula. He has been satting well between 92-97%. Will continue titration to maintain SaO2 greater than 92%. Chest x-ray revealed worsening right infrahilar infiltration and left perihilar infiltration.  Noncontrast CT chest ordered for further evaluation, multifocal atelectasis without suspected infiltrate. Patient has remained afebrile. Discussed incentive spirometry utilization with the patient to prevent further atelectasis and fluid accumulation.  RT administered acapella flutter valve to help with any expectoration and mucous clearing. 2. Lung cancer, right upper lobe adenocarcinoma:  POD#2 s/p bronchoscopy, epidural catheter, right thoracotomy, right upper lobectomy and lymph node dissection. Mild pleural effusion. Pathology is pending. Patient does have 2 large-bore chest tubes to suction which had produced 210cc over 24hrs.  Pain control with fentanyl via epidural catheter. Dr. Tanya Gregory, CV surgery to follow. Anastasiia Holguin is planning on removing the epidural catheter tomorrow on postop day 3.    3. DMT2, uncontrolled:  A1c 9/28/2020 6.6, patient did receive dexamethasone intraoperatively likely contributing. ACCU and medium SSI began yesterday for glucose correction. Most recent glucose 186.    4. Nonobstructive CAD: History of [facility]-2019, preoperative echo completed on 9/ 2020 LVEF 45%.  Aspirin is on hold.  Patient still has mild pleuritic right-sided chest pain likely secondary to surgery and is successfully reduced with his epidural.    5. HFrEF, chronic: Echo 9/2020 LVEF 45%, stable, will continue to monitor. 6. Non-Hodgkin's lymphoma: History, status post 6 cycles of chemotherapy with last treatment in May 2020.  PET scan May 2020 no evidence of other malignancy. Established patient of Dr. Jackeline Martinez will continue to monitor. 7. GERD: history, monitor. Patient not complaining of reflux or burning cp and not on any home medications. 8. Insomnia, chronic: Trazodone qhs, controlled. 9. Ascending aorta dilitation: Noted to be mildly dilated on ECHO 9/2020 will continue to monitor. 10. Morbid Obesity :  BMI 45.41         Cardiothoracic Surgery    Plan:    1. CXR reviewed- Continue daily CXR's    2. Continue epidural for pain control    3.  Transfer to  tomorrow       Sodium 134 meq/L      Potassium 4.5 meq/L      Chloride 100 meq/L      CO2 25 meq/L      Glucose 236 mg/dL      BUN 10 mg/dL      CREATININE 0.6 mg/dL      Calcium 9.0 mg/dL       WBC 5.9 thou/mm3      RBC 4.53 mill/mm3      Hemoglobin 13.4 gm/dl      Hematocrit 42.2 %      MCV 93.2 fL      MCH 29.6 pg      MCHC 31.8 gm/dl      RDW-CV 14.4 %      RDW-SD 49.0 fL      Platelets 639 thou/mm3      MPV 10.0 fL      Pathologist Review MILADIS AYALA      Seg Neutrophils 79.0 %      Lymphocytes 13.0 %      Monocytes 6.0 %      Eosinophils 2.0 %      Basophils 0.0 %      Platelet Estimate ADEQUATE      Segs Absolute 4.7 thou/mm3      Lymphocytes Absolute 0.8 thou/mm3      Monocytes Absolute 0.4 thou/mm3      Eosinophils Absolute 0.1 thou/mm3      Basophils Absolute 0.0 thou/mm3      nRBC 0 /100 wbc      Hypersegmented Neutrophils Present

## 2020-10-05 NOTE — DISCHARGE INSTR - DIET
 Good nutrition is important when healing from an illness, injury, or surgery. Follow any nutrition recommendations given to you during your hospital stay.  If you were given an oral nutrition supplement while in the hospital, continue to take this supplement at home. You can take it with meals, in-between meals, and/or before bedtime. These supplements can be purchased at most local grocery stores, pharmacies, and chain super-stores.  If you have any questions about your diet or nutrition, call the hospital and ask for the dietitian. What foods are good to eat? You will be eating foods that are easy to chew and swallow on this diet. These foods are naturally soft. If not, you can cook, chop, or mash them to make them soft. These include:  Cream soups  Moist, tender meats (fish or poultry)  Milk products  Yogurt  Cottage cheese  Cooked or canned fruit  Fruit juice  Cooked or canned veggies  Mashed, baked, or boiled vegetables  Cooked cereals  Plain white rice  Soft breads  Pasta  Butter  Mayonnaise  Sour cream  Vegetable oil  Smooth ice cream  Sherbet  Custards  Puddings  Cakes   What foods should be limited or avoided? You should stay away from foods that are hard to chew or swallow, such as:  Hard meat  Raw fruits and veggies  Chewy breads  Crispy crackers or chips  Nuts and seeds  Fried foods  Sausage  Cold cuts  Strong cheese  Peanut brittle  Candy with dried fruit      You are being placed on a diabetic carb counting diet. Eating healthy is the first step in controlling diabetes    Here's how to get started. ... Eat 3 meals a day. Eat your meals at the same time each day and do not skip meals. Eat about the same amount of food each day. Limit sugar and sweets. Eat less candy, desserts, pastries and jelly. Limit intake of regular pop, sugary beverages and fruit juice. Drink sugar free beverages such as diet pop, water, Crystal Light, and unsweetened tea instead.   Use Equal or Sweet-n-Low in place of sugar. Lose weight if you are overweight. Even a small amount of weight loss may help improve your blood sugar control. To help lose weight, reduce your portion sizes. Control your intake of carbohydrates. Carbohydrate is the main  nutrient that affects blood sugar levels. All the carbohydrate you eat is turned  into sugar by your body. Therefore, it is important to control  the amount  of carbohydrate that you eat a day. You should eat about 60-75  grams of  carbohydrate at each meal.      Common sources of carbohydrates:                       Eat more fiber. Fiber can help slow down the rise in blood sugar following a meal.  To get more fiber in your diet, eat at least 5 servings of fruits and vegetables a day, choose whole grain bread/cereal and eat more beans or legumes. Reduce your intake of high fat foods. Cutting back on your intake of high fat food can help reduce body weight and cholesterol levels. Reduce intake of fried food, smith, sausage, luncheon meat, gravy, sour cream, cheese, egg yolks and margarine/butter. Limit your intake of alcohol. Drink alcohol only with permission of your doctor. Never drink alcohol on an empty stomach. Be more active. Regular exercise is an important part of your diabetes care as exercise can help lower your blood sugar levels. The type and amount of exercise that is right for you should be discussed with your doctor.

## 2020-10-05 NOTE — PLAN OF CARE
Problem: Falls - Risk of:  Goal: Will remain free from falls  Description: Will remain free from falls  10/5/2020 1003 by Cesilia Loza RN  Outcome: Ongoing  Note: Patient up as tolerated with standby assistance. Using call light appropriately. Alert and oriented X 4. Problem: Falls - Risk of:  Goal: Absence of physical injury  Description: Absence of physical injury  10/5/2020 1003 by Cesilia Loza RN  Outcome: Ongoing  Note: Patient up as tolerated with standby assistance. Using call light appropriately. Alert and oriented X 4. Problem: Pain:  Goal: Pain level will decrease  Description: Pain level will decrease  10/5/2020 1003 by Cesilia Loza RN  Outcome: Ongoing  Note: Pain Assessment: 0-10  Pain Level: 0   Patient's Stated Pain Goal: No pain   Is pain goal met at this time? Yes     Non-Pharmaceutical Pain Intervention(s): Relaxation techniques, Repositioned       Problem: Pain:  Goal: Control of acute pain  Description: Control of acute pain  Outcome: Ongoing  Note: Patient denies pain this morning but verbalizes understanding of pain scale and available options for pain management. Problem: Cardiovascular  Goal: Hemodynamic stability  10/5/2020 1003 by Cesilia Loza RN  Outcome: Ongoing  Note: Patient had small burst of tachycardia while active but recovered well. Problem: Skin Integrity/Risk  Goal: No skin breakdown during hospitalization  10/5/2020 1003 by Cesilia Loza RN  Outcome: Ongoing  Note: No evidence of new skin breakdown this shift. Encouraging patient to reposition every 2 hours, tolerates well. Problem: Serum Glucose Level - Abnormal:  Goal: Ability to maintain appropriate glucose levels has stabilized  Description: Ability to maintain appropriate glucose levels has stabilized  10/5/2020 1003 by Cesilia Loza RN  Outcome: Ongoing  Note: Blood sugar elevated this morning, patient tolerates insulin as ordered.       Problem: Discharge Planning:  Goal: Discharged to appropriate level of care  Description: Discharged to appropriate level of care  10/5/2020 1003 by Dilip Kearney RN  Outcome: Ongoing  Note: Discharge pending physician approval.  Patient plans to return home with his wife upon discharge. Problem: Pain:  Goal: Control of chronic pain  Description: Control of chronic pain  Outcome: Completed     Problem: Urinary Elimination:  Goal: Signs and symptoms of infection will decrease  Description: Signs and symptoms of infection will decrease  10/5/2020 1003 by Dilip Kearney RN  Outcome: Completed     Problem: Urinary Elimination:  Goal: Complications related to the disease process, condition or treatment will be avoided or minimized  Description: Complications related to the disease process, condition or treatment will be avoided or minimized  10/5/2020 1003 by Dilip Kearney RN  Outcome: Completed     Care plan reviewed with patient. Patient verbalize understanding of the plan of care and contribute to goal setting.

## 2020-10-05 NOTE — PROGRESS NOTES
CT/CV Surgery Progress Note    10/5/2020 8:14 AM  Surgeon:  Dr. Morse Organ     Subjective:  Mr. Gayathri Ramirez is resting comfortably in bedside chair, alert, and in no acute distress. No major events overnight. Ambulated and had BM yesterday. Pt denies chest pressure, SOB, fever,chills, N/V/D. Vital Signs: BP (!) 179/79   Pulse 99   Temp 97.8 °F (36.6 °C) (Oral)   Resp 16   Ht 6' (1.829 m)   Wt (!) 312 lb 12.8 oz (141.9 kg)   SpO2 95%   BMI 42.42 kg/m²    Temp (24hrs), Av.2 °F (36.8 °C), Min:97.8 °F (36.6 °C), Max:98.6 °F (37 °C)      PULSE OXIMETRY RANGE: SpO2  Av.7 %  Min: 93 %  Max: 96 %     Labs:   CBC:     Recent Labs     10/02/20  1156 10/03/20  0926 10/05/20  0542   WBC 5.9 6.0 6.0   HGB 13.4* 13.4* 13.8*   HCT 42.2 41.9* 42.0   MCV 93.2 92.9 90.3    158 234     BMP:   Recent Labs     10/02/20  1156  10/03/20  0926  10/05/20  0747   *  --  134*  --   --    K 4.5  --  4.5  --   --      --  100  --   --    CO2 25  --  24  --   --    BUN 10  --  11  --   --    CREATININE 0.6  --  0.6  --   --    POCGLU  --    < >  --    < > 201*    < > = values in this interval not displayed. Last HgA1C:   Lab Results   Component Value Date    LABA1C 6.6 (H) 2020       Imaging:  CXR: I have reviewed the CXR image. Intake/Output Summary (Last 24 hours) at 10/5/2020 0850  Last data filed at 10/5/2020 0426  Gross per 24 hour   Intake 800 ml   Output 0 ml   Net 800 ml       Scheduled Meds:    senna  1 tablet Oral Nightly    docusate sodium  100 mg Oral BID    insulin glargine  10 Units Subcutaneous Nightly    insulin lispro  0-12 Units Subcutaneous TID WC    insulin lispro  0-6 Units Subcutaneous Nightly    metoprolol tartrate  25 mg Oral BID    sodium chloride flush  10 mL Intravenous 2 times per day    traZODone  50 mg Oral Nightly    sodium chloride flush  10 mL Intravenous 2 times per day       ROS: All neg unless specifically mentioned in subjective section. 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 BMP ordered  2. Portable CXR ordered  3. Will discuss d/c following testing  4.  If d/c'ed follow up with Mary Epperson PA-C in 2 weeks with CXR     The plan of care was discussed in detail with Dr. Hunter Nance PA-C

## 2020-10-06 NOTE — DISCHARGE SUMMARY
Hospital Medicine Discharge Summary      Patient Identification:   Michelle Leigh   : 1960  MRN: [id number]   Account: [de-identified]      Patient's PCP: Kapil King MD, MD    Admit Date: 2020     Discharge Date: 10/5/2020      Admitting Physician: Clary Capellan MD     Discharge Physician: Fran Lorenzo DO       Hospital Course:   Nabil Figueredo is a 56 y.o. male admitted to [address] [place] [address] on 2020 for right upper lung lobectomy. The patient follows Dr. Milton Graham outpatient, and was recently diagnosed with right upper lobe adenocarcinoma of the lung. He also follows for lymphoma and has received chemotherapy in the past for this. He was evaluated preoperatively by cardiovascular surgery as outpatient, and came in for elective surgery. On  the patient underwent bronchoscopy with right thoracotomy right upper lobectomy and lymph node dissection with epidural catheter placement. He was monitored in the ICU postoperatively and was transferred to ICU stepdown 4K after removal of chest tubes bilaterally and epidural catheter. His pain was controlled on oral analgesia regimen, and repeat chest x-ray was obtained on the day of discharge which did reveal a small apical right pneumothorax (asymptomatic, no O2 requirement). CT surgery was made aware of this and stated patient okay for discharge home. He will follow-up in 2 weeks with CV surgery and will also have repeat chest x-ray prior to this, given strict return precautions should he develop any shortness of breath, chest pain, or other signs or symptoms concerning for worsening pneumothorax. He had constipation during his stay, but was passing flatus and was able to have a bowel movement prior to discharge. He will be discharged on a bowel regimen, NarX report was reviewed prior to prescribing opiate therapy.     Of note, preop eval revealed a new slight drop in ejection fraction of 45%, recommend follow-up with cardiology as outpatient, he is euvolemic and recent stress test was within normal limits. The patient was stable for discharge - all consultants were contacted and in agreement with plan for discharge. Appropriate follow up appointment was arranged prior to discharge. Please see below or view chart for more details from hospital course. Discharge Diagnoses:    1. Acute pulmonary insufficiency following surgery, hypoxia: resolved, continue IS/Acapella. 2. Lung Adenocarcinoma, right upper lobe:  S/p bronchoscopy, epidural catheter, right thoracotomy, right upper lobectomy and lymph node dissection 9/30 with Dr. Barron. S/p 2 large-bore chest tubes and epidural catheter removed on 10/3.   1. Dr. Vanessa Myers,  surgery to follow. CXR as OP in 2 weeks  2. To follow Dr. Benedict Carranza as outpatient  3. Small apical right pneumothorax postoperatively: possibly related to ex vacuo from lobectomy, asymptomatic  4. Constipation: add on senna, colace and konosul. Passing flatus. 5. DMT2, uncontrolled:  A1c 9/28/2020 6.6, patient did receive dexamethasone intraoperatively likely contributing. Improved. 6. Nonobstructive CAD: History of [facility], preoperative echo completed on 9/2020 LVEF 45%.    1. Aspirin will be resumed as per CT surgery's recommendations  2. BB added on  3. Consider statin? Defer to outpatient provider  7. HFrEF, chronic: Echo 9/2020 LVEF 45%, Euvolemic. 8. Non-Hodgkin's lymphoma: History, follows with Dr. Benedict Carranza. Has undergone chemotherapy. 9. Insomnia, chronic: Trazodone qhs, controlled. 10. Morbid Obesity :  BMI 45.41 Discussed weight loss. The patient was seen and examined on day of discharge and this discharge summary is in conjunction with any daily progress note from day of discharge.         Exam:     Vitals:  Vitals:    10/05/20 0421 10/05/20 0832 10/05/20 0915 10/05/20 1058   BP: (!) 179/79 (!) 143/92  135/76   Pulse: 99 116  85   Resp: 16 18  14   Temp: 97.8 °F (36.6 °C) 98.1 °F (36.7 °C) 98.3 °F (36.8 °C)   TempSrc: Oral Oral  Oral   SpO2: 95% 95% 95% 93%   Weight:       Height:         Weight: Weight: (!) 312 lb 12.8 oz (141.9 kg)     24 hour intake/output:No intake or output data in the 24 hours ending 10/06/20 1747      General appearance: No apparent distress, well developed, appears stated age. Obese  Eyes:  Pupils equal, round, and reactive to light. Conjunctivae/corneas clear. HENT: Head normal in appearance. External nares normal.  Oral mucosa moist without lesions. Hearing grossly intact. Neck: Supple, with full range of motion. No jugular venous distention. Trachea midline. Respiratory:  Normal respiratory effort. Clear to auscultation, bilaterally without rales or wheezes or Rhonchi. Bandages noted in the right lung. Cardiovascular: Normal rate, regular rhythm with normal S1/S2 without murmurs. No lower extremity edema. Abdomen: Soft, non-tender, non-distended with normal bowel sounds. Musculoskeletal: Normal range of motion in extremities. There is no joint swelling or tenderness. Skin: Skin color, texture, turgor normal.  No rashes or lesions. Neurologic:  Neurovascularly intact without any focal sensory/motor deficits in the upper and lower extremities. Cranial nerves:  grossly non-focal.  Psychiatric: Alert and oriented, thought content appropriate, normal insight. Capillary Refill: Brisk,< 3 seconds. Peripheral Pulses: +2 palpable, equal bilaterally. Labs:  For convenience and continuity at follow-up the following most recent labs are provided:      CBC:    Lab Results   Component Value Date    WBC 6.0 10/05/2020    HGB 13.8 10/05/2020    HCT 42.0 10/05/2020     10/05/2020       Renal:    Lab Results   Component Value Date     10/05/2020    K 4.2 10/05/2020    K 4.1 09/30/2020    CL 99 10/05/2020    CO2 28 10/05/2020    BUN 16 10/05/2020    CREATININE 0.8 10/05/2020    CALCIUM 9.5 10/05/2020         Significant Diagnostic Studies    Radiology:   XR CHEST PORTABLE   Final Result   1. There is stable volume loss within the right chest. There is stable mild elevation of the right hemidiaphragm. There is a small less than 5% right apical pneumothorax. Pleural fluid was seen within this region on the previous examination. There is    also stable pleural reaction/fluid along the lateral margin of the right chest which most likely is related to the acute fractures of the right 5th and 6th ribs. Follow-up chest radiographs are recommended to confirm complete resolution. 2. There is stable widening of the superior mediastinum. 3. There is very mild patchy opacity within the left infrahilar region. There is no pulmonary vascular congestion. Follow-up chest radiographs recommended to confirm complete resolution. 4. There are stable fractures of the right 5th and 6th ribs. **This report has been created using voice recognition software. It may contain minor errors which are inherent in voice recognition technology. **      Final report electronically signed by Dr. Frida Sainz on 10/5/2020 9:29 AM      XR CHEST (2 VW)   Final Result   Impression:   1. Right chest tubes removed. No pneumothorax. This document has been electronically signed by: Lorraine Muñoz MD on 10/04/2020 06:52 AM         XR CHEST PORTABLE   Final Result   No focal infiltrate or mass. Stable volume loss right hemithorax, related to prior surgery. Chest tubes satisfactory. This document has been electronically signed by: Roxane Ayala MD on    10/03/2020 07:21 AM         CT CHEST WO CONTRAST   Final Result   Impression:   Small right hydropneumothorax. Right chest tube positions as described. Multifocal atelectasis without suspected infiltrate. This document has been electronically signed by:  Roxane Ayala MD on    10/02/2020 07:02 AM      All CT scans at this facility use dose modulation, iterative    reconstruction, and/or weight-based dosing when appropriate to reduce radiation dose to as low as reasonably    achievable. XR CHEST PORTABLE   Final Result   Increased right infrahilar and left perihilar atelectasis or infiltrates. Similar pleural effusions. This document has been electronically signed by: Juan Mario MD on    10/02/2020 04:05 AM         XR CHEST PORTABLE   Final Result   Impression:      Stable bilateral basilar atelectasis and small pleural effusions. This document has been electronically signed by: Thi Zuleta MD on    10/01/2020 04:55 AM         XR CHEST PORTABLE   Final Result   1. There is volume loss within the right hemithorax likely related to postsurgical change. Two large bore right-sided chest tubes are seen. No pneumothorax is seen at this time. **This report has been created using voice recognition software. It may contain minor errors which are inherent in voice recognition technology. **      Final report electronically signed by Dr. Donn Gustafson on 9/30/2020 4:07 PM      XR CHEST STANDARD (2 VW)    (Results Pending)          Consults:     None    Disposition:    [x] Home        [] TCU       [] Rehab       [] Psych       [] SNF       [] [place]       [] Other-    Condition at Discharge: Stable    Code Status:  Prior Full    Patient Instructions:    Discharge lab work: n/a  Activity: activity as tolerated and no flying or rapid pressure changes since pneumothorax  Diet: No diet orders on file      Follow-up visits:   Cyndy Wong MD  [address] [place] [address] 280  [facility],[address]  [facility].[address] [facility]  [phone]    On 10/14/2020  oncology/hematology, your appointment time is at 3:15 pm, bring medications, photo id, & insurance card, please arrive 15 minutes prior to appointment time    Belgica Palencia PA-C  [address]  [phone]    On 10/19/2020  cardiothoracic surgeon, your appointment time is at 11:00 am, bring medications, photo id, & insurance card, please arrive 15 minutes prior to appointment time    Shelly Briscoe MD  [address] [address]  [phone]    On 10/12/2020  family physician, your appointment time is at 10:30 am , bring medications, photo id, & insurance card, please arrive 15 minutes prior to appointment time         Discharge Medications:        Medication List      START taking these medications    docusate 100 MG Caps  Commonly known as:  COLACE, DULCOLAX  Take 100 mg by mouth 2 times daily     metoprolol tartrate 25 MG tablet  Commonly known as:  LOPRESSOR  Take 1 tablet by mouth 2 times daily     oxyCODONE-acetaminophen 5-325 MG per tablet  Commonly known as:  PERCOCET  Take 1 tablet by mouth every 6 hours as needed for Pain for up to 3 days. polyethylene glycol 17 g packet  Commonly known as:  GLYCOLAX  Take 17 g by mouth daily as needed for Constipation     senna 8.6 MG tablet  Commonly known as:  SENOKOT  Take 1 tablet by mouth daily as needed for Constipation        CONTINUE taking these medications    aspirin 81 MG tablet     glipiZIDE 5 MG extended release tablet  Commonly known as:  GLUCOTROL XL     MAGNESIUM PO     metFORMIN 1000 MG tablet  Commonly known as:  GLUCOPHAGE     prochlorperazine 10 MG tablet  Commonly known as:  COMPAZINE  Take 1 tablet by mouth every 6 hours as needed (nausea)     traZODone 50 MG tablet  Commonly known as:  DESYREL        ASK your doctor about these medications    promethazine 25 MG tablet  Commonly known as:  PHENERGAN  TAKE ONE TABLET BY MOUTH EVERY 6 HOURS AS NEEDED FOR NAUSEA. Ask about: Which instructions should I use? Where to Get Your Medications      These medications were sent to [address], [place] [address]  [address] [address] [place] [address]    Phone:  [phone]   · promethazine 25 MG tablet     These medications were sent to [address], [address] [address]    Phone:  [phone]   · docusate 100 MG Caps  · metoprolol tartrate 25 MG tablet  · polyethylene glycol 17 g packet  · senna 8.6 MG tablet     You can get these medications from any pharmacy    Bring a paper prescription for each of these medications  · oxyCODONE-acetaminophen 5-325 MG per tablet             Time Spent on discharge is roughly 25 minutes in the examination, evaluation, counseling and review of medications and discharge plan. Thank you Lizzy Tavera MD, MD for the opportunity to be involved in this patient's care.     Signed:    Electronically signed by Ankit Carrero DO on 10/6/2020 at 5:47 PM

## 2020-10-08 NOTE — PROGRESS NOTES
CLINICAL PHARMACY NOTE: MEDS TO [address] Select Patient?: No  Total # of Prescriptions Filled: 4   The following medications were delivered to the patient:  Metoprolol Tartrate 25mg  Senna 8.6mg  Docusate 100mg  Clearlax  Total # of Interventions Completed: 2  Time Spent (min): 30    Additional Documentation:

## 2020-10-12 ENCOUNTER — HOSPITAL ENCOUNTER (OUTPATIENT)
Dept: CT IMAGING | Age: 60
Discharge: HOME | End: 2020-10-12

## 2020-10-12 ENCOUNTER — HOSPITAL ENCOUNTER (OUTPATIENT)
Dept: GENERAL RADIOLOGY | Age: 60
Discharge: HOME | End: 2020-10-12

## 2020-10-14 ENCOUNTER — OFFICE VISIT (OUTPATIENT)
Dept: ONCOLOGY | Age: 60
End: 2020-10-14
Payer: COMMERCIAL

## 2020-10-14 ENCOUNTER — HOSPITAL ENCOUNTER (OUTPATIENT)
Dept: INFUSION THERAPY | Age: 60
Discharge: HOME | End: 2020-10-14
Payer: COMMERCIAL

## 2020-10-14 VITALS
HEIGHT: 72 IN | DIASTOLIC BLOOD PRESSURE: 77 MMHG | HEART RATE: 99 BPM | BODY MASS INDEX: 42.66 KG/M2 | RESPIRATION RATE: 18 BRPM | WEIGHT: 315 LBS | TEMPERATURE: 96.6 F | SYSTOLIC BLOOD PRESSURE: 115 MMHG

## 2020-10-14 PROCEDURE — 1036F TOBACCO NON-USER: CPT | Performed by: INTERNAL MEDICINE

## 2020-10-14 PROCEDURE — 99215 OFFICE O/P EST HI 40 MIN: CPT | Performed by: INTERNAL MEDICINE

## 2020-10-14 PROCEDURE — 1111F DSCHRG MED/CURRENT MED MERGE: CPT | Performed by: INTERNAL MEDICINE

## 2020-10-14 PROCEDURE — 99211 OFF/OP EST MAY X REQ PHY/QHP: CPT

## 2020-10-14 PROCEDURE — G8427 DOCREV CUR MEDS BY ELIG CLIN: HCPCS | Performed by: INTERNAL MEDICINE

## 2020-10-14 PROCEDURE — G8417 CALC BMI ABV UP PARAM F/U: HCPCS | Performed by: INTERNAL MEDICINE

## 2020-10-14 PROCEDURE — G8484 FLU IMMUNIZE NO ADMIN: HCPCS | Performed by: INTERNAL MEDICINE

## 2020-10-14 PROCEDURE — 3017F COLORECTAL CA SCREEN DOC REV: CPT | Performed by: INTERNAL MEDICINE

## 2020-10-14 NOTE — PATIENT INSTRUCTIONS
1.  Pre-CERT chemotherapy treatment for Alimta and Cisplatin. 2.  Schedule first cycle of chemotherapy with laboratory studies in November. 3.  Return to clinic to see me on second cycle of chemotherapy treatment with labs.

## 2020-10-16 PROBLEM — D64.9 CHRONIC ANEMIA: Status: ACTIVE | Noted: 2020-10-16

## 2020-10-16 RX ORDER — TEMAZEPAM 15 MG/1
15 CAPSULE ORAL NIGHTLY PRN
Qty: 30 CAPSULE | Refills: 2 | Status: SHIPPED | OUTPATIENT
Start: 2020-10-16 | End: 2020-11-15

## 2020-10-16 NOTE — PROGRESS NOTES
[facility]  [facility]  [facility] [address] [facility] [address]  [address] [facility] [address]  [address]  Dept: [phone]  Dept Fax: [phone]  Loc: [phone]     Subjective:      Chief Complaint:  Max Delwyn Hatchet is a 56 y.o. with lymphoma and adenocarcinoma of the lung. HPI:  The patient is here today for follow-up regarding his history adenocarcinoma of the lung as well as a history of non-Hodgkin's lymphoma. Most recently he is underwent a surgical resection of his lung cancer by Dr. Viktoria Musa. The patient underwent a right upper and right middle lobe bilobed lobectomy. He is recovering well from the surgery. The patient continues to have chronic pain at the site of his surgery and is having trouble with difficulty with sleeping at night. Surgical pathology confirmed adenocarcinoma. All lymph nodes were negative and the patient's stage of his malignancy was stage IA3. This was reviewed with the patient today. I have recommended adjuvant chemotherapy treatment with 4 cycles of cisplatin and Alimta combination chemotherapy treatment. The potential benefits and potential side effects of this therapy were reviewed with the patient today. He is willing to proceed. In regards to his lymphoma the patient has no signs or symptoms that be suggestive recurrence of his lymphoma. He denies hot flashes, night sweats, unintentional weight loss, or unexplained fever. Nabil has not had fever, cough, shortness of breath or other signs of infection. The patient's bowel and bladder habits have been normal.  He has not seen blood in his stool or urine. The patient remains active with an ECOG performance status of level 0. Nabil has mild chronic anemia. He does not have any evidence of blood loss. PMH, SH, and FH:  I reviewed the patients medication list and allergy list as noted on the electronic medical record.  The PMH, SH and FH were also reviewed as noted on the EMR. Review of Systems  Constitutional: Insomnia. HENT: Negative. Eyes: Negative. Respiratory: Negative. Cardiovascular: Negative. Gastrointestinal: Negative. Genitourinary: Negative. Musculoskeletal: Pain at surgical site. Skin: Negative. Neurological: Negative. Hematological: Negative. Psychiatric/Behavioral: Negative. Objective:   Physical Exam  Vitals:    10/14/20 1520   BP: 115/77   Pulse: 99   Resp: 18   Temp: 96.6 °F (35.9 °C)   Vitals reviewed and are stable. Constitutional: Well-developed. No acute distress. HENT: Normocephalic and atraumatic. Eyes: Pupils appear equal. No scleral icterus. Neck: Overall appearance is symmetrical. No identifiable masses. Chest: Mediport in place in the upper chest. Appears stable. Pulmonary: Effort normal. No respiratory distress. Cardiovascular: No edema in any of the four extremities. Abdominal: Soft. No hepatomegaly or splenomegaly. Musculoskeletal: Gait is normal. Muscle strength and tone grossly good. Neurological: Alert and oriented to person, place, and time. Judgment and thought content normal.  Skin: Skin is warm and dry. No rash. Psychiatric: Mood and affect appropriate for the clinical situation. Data Analysis: The following studies were reviewed with the patient today:    Hematology 10/5/2020 10/3/2020 10/2/2020   WBC 6.0 6.0 5.9   RBC 4.65 (L) 4.51 (L) 4.53 (L)   HGB 13.8 (L) 13.4 (L) 13.4 (L)   HCT 42.0 41.9 (L) 42.2   MCV 90.3 92.9 93.2   RDW       158 173     Assessment:   1. Follicular lymphoma stage III. 2.  Adenocarcinoma of the lung. 3.  Chemotherapy encounter. Plan:   1. Pre-CERT chemotherapy treatment for Alimta and Cisplatin. 2.  Schedule first cycle of chemotherapy with laboratory studies in November. 3.  Monitor for recurrence of malignancy.   4.  Monitor for side effects and toxicity from Alimta and Cisplatin combination chemotherapy. Donald Dahl M.D. Medical Director: [facility]  Cancer [place] [address], [address] [facility], [address] [place] [address], [address] [place] [address], [address] [place] [address] of the [facility] at [facility]      **This report has been created using voice recognition software. It may contain minor errors which are inherent in voice recognition technology. **

## 2020-10-19 ENCOUNTER — TELEPHONE (OUTPATIENT)
Dept: CARDIOLOGY CLINIC | Age: 60
End: 2020-10-19

## 2020-10-28 ENCOUNTER — TELEPHONE (OUTPATIENT)
Dept: CARDIOTHORACIC SURGERY | Age: 60
End: 2020-10-28

## 2020-10-28 NOTE — TELEPHONE ENCOUNTER
Procedure(s):09.30.2020  BRONCHOSCOPY, EPIDURAL CATHETER, RIGHT THORACOTOMY RIGHT UPPER LOBECTOMY and lymph node dissection    Patient No showed to Dr. Maria Ines carpenter apt on 10.19.2020  Patient is currently in DUVED.       Rescheduled for 11.3.2020 at 11am

## 2020-11-01 RX ORDER — METHYLPREDNISOLONE SODIUM SUCCINATE 125 MG/2ML
125 INJECTION, POWDER, LYOPHILIZED, FOR SOLUTION INTRAMUSCULAR; INTRAVENOUS ONCE
Status: CANCELLED | OUTPATIENT
Start: 2020-11-02

## 2020-11-01 RX ORDER — DIPHENHYDRAMINE HYDROCHLORIDE 50 MG/ML
50 INJECTION INTRAMUSCULAR; INTRAVENOUS ONCE
Status: CANCELLED | OUTPATIENT
Start: 2020-11-02

## 2020-11-01 RX ORDER — HEPARIN SODIUM (PORCINE) LOCK FLUSH IV SOLN 100 UNIT/ML 100 UNIT/ML
500 SOLUTION INTRAVENOUS PRN
Status: CANCELLED | OUTPATIENT
Start: 2020-11-02

## 2020-11-01 RX ORDER — FOLIC ACID 1 MG/1
1 TABLET ORAL DAILY
Qty: 30 TABLET | Refills: 5 | Status: SHIPPED | OUTPATIENT
Start: 2020-11-01 | Stop reason: SDUPTHER

## 2020-11-01 RX ORDER — PROMETHAZINE HYDROCHLORIDE 25 MG/1
TABLET ORAL
Qty: 60 TABLET | Refills: 3 | Status: SHIPPED | OUTPATIENT
Start: 2020-11-01 | End: 2021-12-01 | Stop reason: ALTCHOICE

## 2020-11-01 RX ORDER — SODIUM CHLORIDE 0.9 % (FLUSH) 0.9 %
5 SYRINGE (ML) INJECTION PRN
Status: CANCELLED | OUTPATIENT
Start: 2020-11-02

## 2020-11-01 RX ORDER — SODIUM CHLORIDE 9 MG/ML
INJECTION, SOLUTION INTRAVENOUS CONTINUOUS
Status: CANCELLED | OUTPATIENT
Start: 2020-11-02

## 2020-11-01 RX ORDER — SODIUM CHLORIDE 0.9 % (FLUSH) 0.9 %
10 SYRINGE (ML) INJECTION PRN
Status: CANCELLED | OUTPATIENT
Start: 2020-11-02

## 2020-11-02 ENCOUNTER — HOSPITAL ENCOUNTER (OUTPATIENT)
Dept: INFUSION THERAPY | Age: 60
Discharge: HOME | End: 2020-11-02
Payer: COMMERCIAL

## 2020-11-02 VITALS
RESPIRATION RATE: 20 BRPM | OXYGEN SATURATION: 95 % | SYSTOLIC BLOOD PRESSURE: 126 MMHG | WEIGHT: 315 LBS | HEIGHT: 72 IN | DIASTOLIC BLOOD PRESSURE: 79 MMHG | BODY MASS INDEX: 42.66 KG/M2 | TEMPERATURE: 98.5 F | HEART RATE: 100 BPM

## 2020-11-02 DIAGNOSIS — Z51.11 ENCOUNTER FOR CHEMOTHERAPY MANAGEMENT: ICD-10-CM

## 2020-11-02 DIAGNOSIS — C82.58 DIFFUSE FOLLICLE CENTER LYMPHOMA OF LYMPH NODES OF MULTIPLE REGIONS (HCC): ICD-10-CM

## 2020-11-02 DIAGNOSIS — C34.91 ADENOCARCINOMA, LUNG, RIGHT (HCC): Primary | ICD-10-CM

## 2020-11-02 LAB
ABSOLUTE IMMATURE GRANULOCYTE: 0.11 THOU/MM3 (ref 0–0.07)
ALBUMIN SERPL-MCNC: 4.1 G/DL (ref 3.5–5.1)
ALP BLD-CCNC: 65 U/L (ref 38–126)
ALT SERPL-CCNC: 41 U/L (ref 11–66)
AST SERPL-CCNC: 42 U/L (ref 5–40)
BASINOPHIL, AUTOMATED: 1 % (ref 0–3)
BASOPHILS ABSOLUTE: 0 THOU/MM3 (ref 0–0.1)
BILIRUB SERPL-MCNC: 0.6 MG/DL (ref 0.3–1.2)
BILIRUBIN DIRECT: < 0.2 MG/DL (ref 0–0.3)
BUN, WHOLE BLOOD: 15 MG/DL (ref 8–26)
CHLORIDE, WHOLE BLOOD: 105 MEQ/L (ref 98–109)
CREATININE, WHOLE BLOOD: 0.8 MG/DL (ref 0.5–1.2)
EOSINOPHILS ABSOLUTE: 0.6 THOU/MM3 (ref 0–0.4)
EOSINOPHILS RELATIVE PERCENT: 10 % (ref 0–4)
GFR, ESTIMATED ,CON: > 90 ML/MIN/1.73M2
GLUCOSE, WHOLE BLOOD: 172 MG/DL (ref 70–108)
HCT VFR BLD CALC: 40.9 % (ref 42–52)
HEMOGLOBIN: 13.5 GM/DL (ref 14–18)
IMMATURE GRANULOCYTES: 2 %
IONIZED CALCIUM, WHOLE BLOOD: 1.12 MMOL/L (ref 1.12–1.32)
LYMPHOCYTES # BLD: 12 % (ref 15–47)
LYMPHOCYTES ABSOLUTE: 0.7 THOU/MM3 (ref 1–4.8)
MCH RBC QN AUTO: 29.6 PG (ref 26–33)
MCHC RBC AUTO-ENTMCNC: 33 GM/DL (ref 32.2–35.5)
MCV RBC AUTO: 90 FL (ref 80–94)
MONOCYTES ABSOLUTE: 0.5 THOU/MM3 (ref 0.4–1.3)
MONOCYTES: 8 % (ref 0–12)
PDW BLD-RTO: 14.9 % (ref 11.5–14.5)
PLATELET # BLD: 195 THOU/MM3 (ref 130–400)
PMV BLD AUTO: 10.1 FL (ref 9.4–12.4)
POTASSIUM, WHOLE BLOOD: 3.6 MEQ/L (ref 3.5–4.9)
RBC # BLD: 4.56 MILL/MM3 (ref 4.7–6.1)
SEG NEUTROPHILS: 67 % (ref 43–75)
SEGMENTED NEUTROPHILS ABSOLUTE COUNT: 3.9 THOU/MM3 (ref 1.8–7.7)
SODIUM, WHOLE BLOOD: 140 MEQ/L (ref 138–146)
TOTAL CO2, WHOLE BLOOD: 25 MEQ/L (ref 23–33)
TOTAL PROTEIN: 6.1 G/DL (ref 6.1–8)
WBC # BLD: 5.9 THOU/MM3 (ref 4.8–10.8)

## 2020-11-02 PROCEDURE — 96367 TX/PROPH/DG ADDL SEQ IV INF: CPT

## 2020-11-02 PROCEDURE — 36591 DRAW BLOOD OFF VENOUS DEVICE: CPT

## 2020-11-02 PROCEDURE — 85025 COMPLETE CBC W/AUTO DIFF WBC: CPT

## 2020-11-02 PROCEDURE — 80076 HEPATIC FUNCTION PANEL: CPT

## 2020-11-02 PROCEDURE — 96366 THER/PROPH/DIAG IV INF ADDON: CPT

## 2020-11-02 PROCEDURE — 96413 CHEMO IV INFUSION 1 HR: CPT

## 2020-11-02 PROCEDURE — 96417 CHEMO IV INFUS EACH ADDL SEQ: CPT

## 2020-11-02 PROCEDURE — 2580000003 HC RX 258: Performed by: INTERNAL MEDICINE

## 2020-11-02 PROCEDURE — 96415 CHEMO IV INFUSION ADDL HR: CPT

## 2020-11-02 PROCEDURE — 96372 THER/PROPH/DIAG INJ SC/IM: CPT

## 2020-11-02 PROCEDURE — 6360000002 HC RX W HCPCS: Performed by: INTERNAL MEDICINE

## 2020-11-02 PROCEDURE — 36415 COLL VENOUS BLD VENIPUNCTURE: CPT

## 2020-11-02 PROCEDURE — 80047 BASIC METABLC PNL IONIZED CA: CPT

## 2020-11-02 PROCEDURE — 96375 TX/PRO/DX INJ NEW DRUG ADDON: CPT

## 2020-11-02 RX ORDER — SODIUM CHLORIDE 9 MG/ML
20 INJECTION, SOLUTION INTRAVENOUS ONCE
Status: COMPLETED | OUTPATIENT
Start: 2020-11-02 | End: 2020-11-02

## 2020-11-02 RX ORDER — SODIUM CHLORIDE 0.9 % (FLUSH) 0.9 %
10 SYRINGE (ML) INJECTION PRN
Status: CANCELLED | OUTPATIENT
Start: 2020-11-02

## 2020-11-02 RX ORDER — HEPARIN SODIUM (PORCINE) LOCK FLUSH IV SOLN 100 UNIT/ML 100 UNIT/ML
500 SOLUTION INTRAVENOUS PRN
Status: DISCONTINUED | OUTPATIENT
Start: 2020-11-02 | End: 2020-11-03 | Stop reason: HOSPADM

## 2020-11-02 RX ORDER — CYANOCOBALAMIN 1000 UG/ML
1000 INJECTION INTRAMUSCULAR; SUBCUTANEOUS ONCE
Status: COMPLETED | OUTPATIENT
Start: 2020-11-02 | End: 2020-11-02

## 2020-11-02 RX ORDER — SODIUM CHLORIDE 0.9 % (FLUSH) 0.9 %
10 SYRINGE (ML) INJECTION PRN
Status: DISCONTINUED | OUTPATIENT
Start: 2020-11-02 | End: 2020-11-03 | Stop reason: HOSPADM

## 2020-11-02 RX ORDER — HEPARIN SODIUM (PORCINE) LOCK FLUSH IV SOLN 100 UNIT/ML 100 UNIT/ML
500 SOLUTION INTRAVENOUS PRN
Status: CANCELLED | OUTPATIENT
Start: 2020-11-02

## 2020-11-02 RX ORDER — SODIUM CHLORIDE 0.9 % (FLUSH) 0.9 %
20 SYRINGE (ML) INJECTION PRN
Status: CANCELLED | OUTPATIENT
Start: 2020-11-02

## 2020-11-02 RX ORDER — PALONOSETRON 0.05 MG/ML
0.25 INJECTION, SOLUTION INTRAVENOUS ONCE
Status: COMPLETED | OUTPATIENT
Start: 2020-11-02 | End: 2020-11-02

## 2020-11-02 RX ORDER — SODIUM CHLORIDE 0.9 % (FLUSH) 0.9 %
20 SYRINGE (ML) INJECTION PRN
Status: DISCONTINUED | OUTPATIENT
Start: 2020-11-02 | End: 2020-11-03 | Stop reason: HOSPADM

## 2020-11-02 RX ADMIN — Medication 20 ML: at 08:56

## 2020-11-02 RX ADMIN — POTASSIUM CHLORIDE: 2 INJECTION, SOLUTION, CONCENTRATE INTRAVENOUS at 15:09

## 2020-11-02 RX ADMIN — CYANOCOBALAMIN 1000 MCG: 1000 INJECTION, SOLUTION INTRAMUSCULAR; SUBCUTANEOUS at 10:03

## 2020-11-02 RX ADMIN — CISPLATIN: 1 INJECTION INTRAVENOUS at 11:58

## 2020-11-02 RX ADMIN — SODIUM CHLORIDE 20 ML/HR: 9 INJECTION, SOLUTION INTRAVENOUS at 09:27

## 2020-11-02 RX ADMIN — SODIUM CHLORIDE 1360 MG: 9 INJECTION, SOLUTION INTRAVENOUS at 11:40

## 2020-11-02 RX ADMIN — DEXAMETHASONE SODIUM PHOSPHATE 12 MG: 4 INJECTION, SOLUTION INTRAMUSCULAR; INTRAVENOUS at 11:19

## 2020-11-02 RX ADMIN — POTASSIUM CHLORIDE: 2 INJECTION, SOLUTION, CONCENTRATE INTRAVENOUS at 09:29

## 2020-11-02 RX ADMIN — Medication 10 ML: at 08:55

## 2020-11-02 RX ADMIN — Medication 500 UNITS: at 16:22

## 2020-11-02 RX ADMIN — FOSAPREPITANT 150 MG: 150 INJECTION, POWDER, LYOPHILIZED, FOR SOLUTION INTRAVENOUS at 10:42

## 2020-11-02 RX ADMIN — PALONOSETRON 0.25 MG: 0.05 INJECTION, SOLUTION INTRAVENOUS at 10:41

## 2020-11-02 RX ADMIN — Medication 10 ML: at 16:22

## 2020-11-02 RX ADMIN — Medication 10 ML: at 09:27

## 2020-11-02 ASSESSMENT — PAIN SCALES - GENERAL: PAINLEVEL_OUTOF10: 0

## 2020-11-02 NOTE — PLAN OF CARE

## 2020-11-02 NOTE — PROGRESS NOTES
Chemotherapy Administration    Pre-assessment Data: Antineoplastic Agents  Other:   See toxicity flow sheet for assessment [x]     Physician Notification of Concerns Related to Chemotherapy Administration:   Physician Notified Jose Angel Hang / Time of Notification      Interventions:   Lab work assessed  [x]   Height / Weight verified for dose [x]   Current MAR reviewed [x]   Emergency drugs available as appropriate [x]   Anaphylaxis assessment completed [x]   Pre-medications administered as ordered [x]   Blood return noted upon initiation of chemotherapy [x]   Blood return noted each 1-2ml of a vesicant medication if given IV push []   Blood return noted each 2-3ml of a non-vesicant medication if given IV push []   Monitor for signs / symptoms of hypersensitivity reaction [x]   Chemotherapy orders (drug/dose/rate) verified by 2 Chemo certified RNs [x]   Monitor IV site and blood return throughout the infusion of the medication [x]   Document IV site checks on the IV assessment form [x]   Document chemotherapy teaching on the Patient Education tab [x]   Document patient verbalizes understanding of medications being administered [x]   If IV infiltration, see ONS Guidelines []   Other:      []

## 2020-11-02 NOTE — PROGRESS NOTES
Patient assessed for the following post chemotherapy:    Dizziness   No  Lightheadedness  No      Acute nausea/vomiting No  Headache   No  Chest pain/pressure  No  Rash/itching   No  Shortness of breath  No    Patient kept for 20 minutes observation post infusion chemotherapy. Patient tolerated chemotherapy treatment Cisplatin and Alimta without any complications. Last vital signs:   /79   Pulse 100   Temp 98.5 °F (36.9 °C) (Oral)   Resp 20   Ht 6' (1.829 m)   Wt (!) 319 lb (144.7 kg)   SpO2 95%   BMI 43.26 kg/m²     Patient instructed if he experiences any of the above symptoms following today's infusion,he is to notify MD immediately or go to the emergency department. Discharge instructions given to patient. Verbalizes understanding. Ambulated off unit per self with belongings.

## 2020-11-03 ENCOUNTER — OFFICE VISIT (OUTPATIENT)
Dept: CARDIOTHORACIC SURGERY | Age: 60
End: 2020-11-03

## 2020-11-03 VITALS
DIASTOLIC BLOOD PRESSURE: 95 MMHG | SYSTOLIC BLOOD PRESSURE: 151 MMHG | BODY MASS INDEX: 42.66 KG/M2 | HEIGHT: 72 IN | WEIGHT: 315 LBS | HEART RATE: 106 BPM

## 2020-11-03 PROCEDURE — 99024 POSTOP FOLLOW-UP VISIT: CPT | Performed by: THORACIC SURGERY (CARDIOTHORACIC VASCULAR SURGERY)

## 2020-11-03 NOTE — PROGRESS NOTES
Benja Ferris is a 56years old male who underwent right thoracotomy, right upper lobe and middle lobe lobectomy on September 30, 2020. Final pathology reports showed a 2.5 cm adenocarcinoma of right upper lobe. N1 and N2 all lymph nodes were negative for metastasis. He returned to cardiothoracic surgery clinic for suture removal.  He reports a good recovery. He denied any shortness of breath, fever, chills, or weight loss. Physical examination: Right thoracotomy incision healed well. There are left over silk sutures for chest tube. Sutures were removed. Heart sounds are regular. Lung sounds are clear to auscultation. Assessment: Doing well, status post right thoracotomy and right upper lobe and middle lobe lobectomy for lung cancer. Plan: Follow up with primary care and oncology. Follow-up with us as an as needed basis.

## 2020-11-12 ENCOUNTER — TELEPHONE (OUTPATIENT)
Dept: ONCOLOGY | Age: 60
End: 2020-11-12

## 2020-11-12 RX ORDER — PROCHLORPERAZINE MALEATE 10 MG
10 TABLET ORAL EVERY 6 HOURS PRN
Qty: 60 TABLET | Refills: 5 | Status: SHIPPED | OUTPATIENT
Start: 2020-11-12 | End: 2021-09-20

## 2020-11-22 RX ORDER — SODIUM CHLORIDE 0.9 % (FLUSH) 0.9 %
5 SYRINGE (ML) INJECTION PRN
Status: CANCELLED | OUTPATIENT
Start: 2020-11-30

## 2020-11-22 RX ORDER — HEPARIN SODIUM (PORCINE) LOCK FLUSH IV SOLN 100 UNIT/ML 100 UNIT/ML
500 SOLUTION INTRAVENOUS PRN
Status: CANCELLED | OUTPATIENT
Start: 2020-11-30

## 2020-11-22 RX ORDER — DIPHENHYDRAMINE HYDROCHLORIDE 50 MG/ML
50 INJECTION INTRAMUSCULAR; INTRAVENOUS ONCE
Status: CANCELLED | OUTPATIENT
Start: 2020-11-30

## 2020-11-22 RX ORDER — SODIUM CHLORIDE 9 MG/ML
INJECTION, SOLUTION INTRAVENOUS CONTINUOUS
Status: CANCELLED | OUTPATIENT
Start: 2020-11-30

## 2020-11-22 RX ORDER — METHYLPREDNISOLONE SODIUM SUCCINATE 125 MG/2ML
125 INJECTION, POWDER, LYOPHILIZED, FOR SOLUTION INTRAMUSCULAR; INTRAVENOUS ONCE
Status: CANCELLED | OUTPATIENT
Start: 2020-11-30

## 2020-11-22 RX ORDER — SODIUM CHLORIDE 9 MG/ML
20 INJECTION, SOLUTION INTRAVENOUS ONCE
Status: CANCELLED | OUTPATIENT
Start: 2020-11-30

## 2020-11-22 RX ORDER — SODIUM CHLORIDE 0.9 % (FLUSH) 0.9 %
10 SYRINGE (ML) INJECTION PRN
Status: CANCELLED | OUTPATIENT
Start: 2020-11-30

## 2020-11-22 RX ORDER — PALONOSETRON 0.05 MG/ML
0.25 INJECTION, SOLUTION INTRAVENOUS ONCE
Status: CANCELLED | OUTPATIENT
Start: 2020-11-30

## 2020-11-23 ENCOUNTER — HOSPITAL ENCOUNTER (OUTPATIENT)
Dept: INFUSION THERAPY | Age: 60
Discharge: HOME | End: 2020-11-23
Payer: COMMERCIAL

## 2020-11-23 ENCOUNTER — OFFICE VISIT (OUTPATIENT)
Dept: ONCOLOGY | Age: 60
End: 2020-11-23
Payer: COMMERCIAL

## 2020-11-23 VITALS
WEIGHT: 310.2 LBS | SYSTOLIC BLOOD PRESSURE: 122 MMHG | HEART RATE: 105 BPM | TEMPERATURE: 98.6 F | RESPIRATION RATE: 20 BRPM | HEIGHT: 72 IN | DIASTOLIC BLOOD PRESSURE: 73 MMHG | OXYGEN SATURATION: 94 % | BODY MASS INDEX: 42.01 KG/M2

## 2020-11-23 VITALS
RESPIRATION RATE: 18 BRPM | BODY MASS INDEX: 42.07 KG/M2 | SYSTOLIC BLOOD PRESSURE: 123 MMHG | TEMPERATURE: 97.7 F | HEART RATE: 93 BPM | DIASTOLIC BLOOD PRESSURE: 72 MMHG | OXYGEN SATURATION: 96 % | WEIGHT: 310.2 LBS

## 2020-11-23 DIAGNOSIS — C34.91 ADENOCARCINOMA, LUNG, RIGHT (HCC): Primary | ICD-10-CM

## 2020-11-23 DIAGNOSIS — C82.58 DIFFUSE FOLLICLE CENTER LYMPHOMA OF LYMPH NODES OF MULTIPLE REGIONS (HCC): ICD-10-CM

## 2020-11-23 DIAGNOSIS — Z51.11 ENCOUNTER FOR CHEMOTHERAPY MANAGEMENT: ICD-10-CM

## 2020-11-23 LAB
ALBUMIN SERPL-MCNC: 3.6 G/DL (ref 3.5–5.1)
ALP BLD-CCNC: 59 U/L (ref 38–126)
ALT SERPL-CCNC: 22 U/L (ref 11–66)
AST SERPL-CCNC: 24 U/L (ref 5–40)
BASOPHILS # BLD: 0.9 %
BASOPHILS ABSOLUTE: 0 THOU/MM3 (ref 0–0.1)
BILIRUB SERPL-MCNC: 0.4 MG/DL (ref 0.3–1.2)
BILIRUBIN DIRECT: < 0.2 MG/DL (ref 0–0.3)
BUN, WHOLE BLOOD: 15 MG/DL (ref 8–26)
CHLORIDE, WHOLE BLOOD: 105 MEQ/L (ref 98–109)
CREATININE, WHOLE BLOOD: 1 MG/DL (ref 0.5–1.2)
ELLIPTOCYTES: ABNORMAL
EOSINOPHIL # BLD: 3.6 %
EOSINOPHILS ABSOLUTE: 0.2 THOU/MM3 (ref 0–0.4)
ERYTHROCYTE [DISTWIDTH] IN BLOOD BY AUTOMATED COUNT: 14.8 % (ref 11.5–14.5)
ERYTHROCYTE [DISTWIDTH] IN BLOOD BY AUTOMATED COUNT: 45.8 FL (ref 35–45)
GFR, ESTIMATED ,CON: 80 ML/MIN/1.73M2
GLUCOSE, WHOLE BLOOD: 203 MG/DL (ref 70–108)
HCT VFR BLD CALC: 35.8 % (ref 42–52)
HEMOGLOBIN: 11.9 GM/DL (ref 14–18)
IMMATURE GRANS (ABS): 0.13 THOU/MM3 (ref 0–0.07)
IMMATURE GRANULOCYTES: 2.9 %
IONIZED CALCIUM, WHOLE BLOOD: 1.16 MMOL/L (ref 1.12–1.32)
LYMPHOCYTES # BLD: 18.6 %
LYMPHOCYTES ABSOLUTE: 0.8 THOU/MM3 (ref 1–4.8)
MCH RBC QN AUTO: 30.3 PG (ref 26–33)
MCHC RBC AUTO-ENTMCNC: 33.2 GM/DL (ref 32.2–35.5)
MCV RBC AUTO: 91.1 FL (ref 80–94)
MONOCYTES # BLD: 11.7 %
MONOCYTES ABSOLUTE: 0.5 THOU/MM3 (ref 0.4–1.3)
NUCLEATED RED BLOOD CELLS: 0 /100 WBC
PLATELET # BLD: 241 THOU/MM3 (ref 130–400)
PLATELET ESTIMATE: ADEQUATE
PMV BLD AUTO: 11.6 FL (ref 9.4–12.4)
POIKILOCYTES: ABNORMAL
POTASSIUM, WHOLE BLOOD: 3.5 MEQ/L (ref 3.5–4.9)
RBC # BLD: 3.93 MILL/MM3 (ref 4.7–6.1)
SCAN OF BLOOD SMEAR: NORMAL
SEG NEUTROPHILS: 62.3 %
SEGMENTED NEUTROPHILS ABSOLUTE COUNT: 2.8 THOU/MM3 (ref 1.8–7.7)
SODIUM, WHOLE BLOOD: 140 MEQ/L (ref 138–146)
TOTAL CO2, WHOLE BLOOD: 25 MEQ/L (ref 23–33)
TOTAL PROTEIN: 6 G/DL (ref 6.1–8)
TOXIC GRANULATION: PRESENT
WBC # BLD: 4.5 THOU/MM3 (ref 4.8–10.8)

## 2020-11-23 PROCEDURE — 80076 HEPATIC FUNCTION PANEL: CPT

## 2020-11-23 PROCEDURE — 85025 COMPLETE CBC W/AUTO DIFF WBC: CPT

## 2020-11-23 PROCEDURE — G8484 FLU IMMUNIZE NO ADMIN: HCPCS | Performed by: INTERNAL MEDICINE

## 2020-11-23 PROCEDURE — 3017F COLORECTAL CA SCREEN DOC REV: CPT | Performed by: INTERNAL MEDICINE

## 2020-11-23 PROCEDURE — G8417 CALC BMI ABV UP PARAM F/U: HCPCS | Performed by: INTERNAL MEDICINE

## 2020-11-23 PROCEDURE — 99215 OFFICE O/P EST HI 40 MIN: CPT | Performed by: INTERNAL MEDICINE

## 2020-11-23 PROCEDURE — 96365 THER/PROPH/DIAG IV INF INIT: CPT

## 2020-11-23 PROCEDURE — 36591 DRAW BLOOD OFF VENOUS DEVICE: CPT

## 2020-11-23 PROCEDURE — 96361 HYDRATE IV INFUSION ADD-ON: CPT

## 2020-11-23 PROCEDURE — 1036F TOBACCO NON-USER: CPT | Performed by: INTERNAL MEDICINE

## 2020-11-23 PROCEDURE — 80047 BASIC METABLC PNL IONIZED CA: CPT

## 2020-11-23 PROCEDURE — 2580000003 HC RX 258: Performed by: INTERNAL MEDICINE

## 2020-11-23 PROCEDURE — 6360000002 HC RX W HCPCS: Performed by: INTERNAL MEDICINE

## 2020-11-23 PROCEDURE — 99211 OFF/OP EST MAY X REQ PHY/QHP: CPT

## 2020-11-23 PROCEDURE — G8427 DOCREV CUR MEDS BY ELIG CLIN: HCPCS | Performed by: INTERNAL MEDICINE

## 2020-11-23 RX ORDER — SODIUM CHLORIDE 0.9 % (FLUSH) 0.9 %
20 SYRINGE (ML) INJECTION PRN
Status: DISCONTINUED | OUTPATIENT
Start: 2020-11-23 | End: 2020-11-24 | Stop reason: HOSPADM

## 2020-11-23 RX ORDER — PROMETHAZINE HYDROCHLORIDE 25 MG/1
25 SUPPOSITORY RECTAL EVERY 6 HOURS PRN
Qty: 30 SUPPOSITORY | Refills: 3 | Status: SHIPPED | OUTPATIENT
Start: 2020-11-23 | End: 2020-11-30

## 2020-11-23 RX ORDER — SODIUM CHLORIDE 0.9 % (FLUSH) 0.9 %
10 SYRINGE (ML) INJECTION PRN
Status: DISCONTINUED | OUTPATIENT
Start: 2020-11-23 | End: 2020-11-24 | Stop reason: HOSPADM

## 2020-11-23 RX ORDER — SODIUM CHLORIDE 0.9 % (FLUSH) 0.9 %
20 SYRINGE (ML) INJECTION PRN
Status: CANCELLED | OUTPATIENT
Start: 2020-11-23

## 2020-11-23 RX ORDER — HEPARIN SODIUM (PORCINE) LOCK FLUSH IV SOLN 100 UNIT/ML 100 UNIT/ML
500 SOLUTION INTRAVENOUS PRN
Status: CANCELLED | OUTPATIENT
Start: 2020-11-23

## 2020-11-23 RX ORDER — SODIUM CHLORIDE 0.9 % (FLUSH) 0.9 %
10 SYRINGE (ML) INJECTION PRN
Status: CANCELLED | OUTPATIENT
Start: 2020-11-23

## 2020-11-23 RX ORDER — 0.9 % SODIUM CHLORIDE 0.9 %
1000 INTRAVENOUS SOLUTION INTRAVENOUS ONCE
Status: COMPLETED | OUTPATIENT
Start: 2020-11-23 | End: 2020-11-23

## 2020-11-23 RX ORDER — HEPARIN SODIUM (PORCINE) LOCK FLUSH IV SOLN 100 UNIT/ML 100 UNIT/ML
500 SOLUTION INTRAVENOUS PRN
Status: DISCONTINUED | OUTPATIENT
Start: 2020-11-23 | End: 2020-11-24 | Stop reason: HOSPADM

## 2020-11-23 RX ADMIN — Medication 10 ML: at 09:15

## 2020-11-23 RX ADMIN — DEXAMETHASONE SODIUM PHOSPHATE: 4 INJECTION, SOLUTION INTRAMUSCULAR; INTRAVENOUS at 11:56

## 2020-11-23 RX ADMIN — Medication 500 UNITS: at 13:42

## 2020-11-23 RX ADMIN — Medication 10 ML: at 13:42

## 2020-11-23 RX ADMIN — Medication 20 ML: at 09:16

## 2020-11-23 RX ADMIN — SODIUM CHLORIDE 1000 ML: 9 INJECTION, SOLUTION INTRAVENOUS at 11:10

## 2020-11-23 RX ADMIN — Medication 10 ML: at 11:10

## 2020-11-23 NOTE — PLAN OF CARE
Problem: Infection - Central Venous Catheter-Associated Bloodstream Infection:  Goal: Will show no infection signs and symptoms  Description: Will show no infection signs and symptoms  Outcome: Met This Shift  Note: Mediport site with no redness or warmth. Skin over port intact with no signs of breakdown noted. Patient verbalizes signs/symptoms of port infection and when to notify the physician. Intervention: Infection risk assessment  Note: Discussed port maintenance, infection prevention, signs and when to call the doctor     Problem: Musculor/Skeletal Functional Status  Goal: Absence of falls  Outcome: Met This Shift  Note: Patient verbalizes understanding of fall precautions. Patient free from falls this visit. Intervention: Fall precautions  Note: Discussed fall precautions, call light within reach. Problem: Intellectual/Education/Knowledge Deficit  Goal: Teaching initiated upon admission  Outcome: Met This Shift  Note: Patient verbalizes understanding to verbal information given on IV fluids and zofran/decadron,action and possible side effects. Aware to call MD if develop complications. Intervention: Verbal/written education provided  Note: Discussed IV fluids, zofran/decadron and when to call the doctor. Problem: Discharge Planning  Goal: Knowledge of discharge instructions  Description: Knowledge of discharge instructions  Outcome: Met This Shift  Note: Verbalized understanding of discharge instructions, follow-up appointments, and when to call the physician. Intervention: Discharge to appropriate level of care  Note: Discuss discharge instructions, follow ups, and when to call the doctor. Care plan reviewed with patient. Patient verbalizes understanding of the plan of care and contribute to goal setting.

## 2020-11-23 NOTE — PROGRESS NOTES
Patient assessed for the following post IV fluids:    Dizziness   No  Lightheadedness  No      Acute nausea/vomiting No  Headache   No  Chest pain/pressure  No  Rash/itching   No  Shortness of breath  No     Patient tolerated IV fluids and zofran/decadron without any complications. Last vital signs:   /72   Pulse 93   Temp 97.7 °F (36.5 °C) (Oral)   Resp 18   Wt (!) 310 lb 3.2 oz (140.7 kg)   SpO2 96%   BMI 42.07 kg/m²     Patient instructed if experience any of the above symptoms following today's infusion,he is to notify MD immediately or go to the emergency department. Discharge instructions given to patient. Patient states feels better after hydration, does not want to return again this week for fluids. Patient instructed to call with any changes. Verbalizes understanding. Ambulated off unit per self with belongings.

## 2020-11-30 ENCOUNTER — OFFICE VISIT (OUTPATIENT)
Dept: ONCOLOGY | Age: 60
End: 2020-11-30
Payer: COMMERCIAL

## 2020-11-30 ENCOUNTER — HOSPITAL ENCOUNTER (OUTPATIENT)
Dept: INFUSION THERAPY | Age: 60
Discharge: HOME | End: 2020-11-30
Payer: COMMERCIAL

## 2020-11-30 VITALS
WEIGHT: 301.8 LBS | SYSTOLIC BLOOD PRESSURE: 137 MMHG | RESPIRATION RATE: 18 BRPM | HEART RATE: 95 BPM | HEIGHT: 72 IN | TEMPERATURE: 98.8 F | DIASTOLIC BLOOD PRESSURE: 77 MMHG | BODY MASS INDEX: 40.88 KG/M2 | OXYGEN SATURATION: 95 %

## 2020-11-30 VITALS
BODY MASS INDEX: 40.88 KG/M2 | HEIGHT: 72 IN | DIASTOLIC BLOOD PRESSURE: 82 MMHG | SYSTOLIC BLOOD PRESSURE: 129 MMHG | OXYGEN SATURATION: 94 % | RESPIRATION RATE: 18 BRPM | WEIGHT: 301.8 LBS | HEART RATE: 128 BPM | TEMPERATURE: 98.4 F

## 2020-11-30 DIAGNOSIS — C34.91 ADENOCARCINOMA, LUNG, RIGHT (HCC): Primary | ICD-10-CM

## 2020-11-30 DIAGNOSIS — Z51.11 ENCOUNTER FOR CHEMOTHERAPY MANAGEMENT: ICD-10-CM

## 2020-11-30 LAB
ABSOLUTE IMMATURE GRANULOCYTE: 0.29 THOU/MM3 (ref 0–0.07)
BASINOPHIL, AUTOMATED: 1 % (ref 0–3)
BASOPHILS ABSOLUTE: 0.1 THOU/MM3 (ref 0–0.1)
BUN, WHOLE BLOOD: 14 MG/DL (ref 8–26)
CHLORIDE, WHOLE BLOOD: 103 MEQ/L (ref 98–109)
CREATININE, WHOLE BLOOD: 1.3 MG/DL (ref 0.5–1.2)
EOSINOPHILS ABSOLUTE: 0.1 THOU/MM3 (ref 0–0.4)
EOSINOPHILS RELATIVE PERCENT: 2 % (ref 0–4)
GFR, ESTIMATED ,CON: 59 ML/MIN/1.73M2
GLUCOSE, WHOLE BLOOD: 203 MG/DL (ref 70–108)
HCT VFR BLD CALC: 38.7 % (ref 42–52)
HEMOGLOBIN: 13.1 GM/DL (ref 14–18)
IMMATURE GRANULOCYTES: 4 %
IONIZED CALCIUM, WHOLE BLOOD: 1.12 MMOL/L (ref 1.12–1.32)
LYMPHOCYTES # BLD: 21 % (ref 15–47)
LYMPHOCYTES ABSOLUTE: 1.6 THOU/MM3 (ref 1–4.8)
MCH RBC QN AUTO: 30.3 PG (ref 26–33)
MCHC RBC AUTO-ENTMCNC: 33.9 GM/DL (ref 32.2–35.5)
MCV RBC AUTO: 90 FL (ref 80–94)
MONOCYTES ABSOLUTE: 1 THOU/MM3 (ref 0.4–1.3)
MONOCYTES: 14 % (ref 0–12)
PDW BLD-RTO: 16.4 % (ref 11.5–14.5)
PLATELET # BLD: 443 THOU/MM3 (ref 130–400)
PMV BLD AUTO: 9.7 FL (ref 9.4–12.4)
POTASSIUM, WHOLE BLOOD: 3.4 MEQ/L (ref 3.5–4.9)
RBC # BLD: 4.32 MILL/MM3 (ref 4.7–6.1)
SEG NEUTROPHILS: 59 % (ref 43–75)
SEGMENTED NEUTROPHILS ABSOLUTE COUNT: 4.5 THOU/MM3 (ref 1.8–7.7)
SODIUM, WHOLE BLOOD: 139 MEQ/L (ref 138–146)
TOTAL CO2, WHOLE BLOOD: 25 MEQ/L (ref 23–33)
WBC # BLD: 7.6 THOU/MM3 (ref 4.8–10.8)

## 2020-11-30 PROCEDURE — 96415 CHEMO IV INFUSION ADDL HR: CPT

## 2020-11-30 PROCEDURE — 6360000002 HC RX W HCPCS: Performed by: INTERNAL MEDICINE

## 2020-11-30 PROCEDURE — 96367 TX/PROPH/DG ADDL SEQ IV INF: CPT

## 2020-11-30 PROCEDURE — 85025 COMPLETE CBC W/AUTO DIFF WBC: CPT

## 2020-11-30 PROCEDURE — 36591 DRAW BLOOD OFF VENOUS DEVICE: CPT

## 2020-11-30 PROCEDURE — 3017F COLORECTAL CA SCREEN DOC REV: CPT | Performed by: INTERNAL MEDICINE

## 2020-11-30 PROCEDURE — 96375 TX/PRO/DX INJ NEW DRUG ADDON: CPT

## 2020-11-30 PROCEDURE — 96366 THER/PROPH/DIAG IV INF ADDON: CPT

## 2020-11-30 PROCEDURE — 96413 CHEMO IV INFUSION 1 HR: CPT

## 2020-11-30 PROCEDURE — 1036F TOBACCO NON-USER: CPT | Performed by: INTERNAL MEDICINE

## 2020-11-30 PROCEDURE — 96411 CHEMO IV PUSH ADDL DRUG: CPT

## 2020-11-30 PROCEDURE — 80047 BASIC METABLC PNL IONIZED CA: CPT

## 2020-11-30 PROCEDURE — 99211 OFF/OP EST MAY X REQ PHY/QHP: CPT

## 2020-11-30 PROCEDURE — G8484 FLU IMMUNIZE NO ADMIN: HCPCS | Performed by: INTERNAL MEDICINE

## 2020-11-30 PROCEDURE — G8417 CALC BMI ABV UP PARAM F/U: HCPCS | Performed by: INTERNAL MEDICINE

## 2020-11-30 PROCEDURE — G8427 DOCREV CUR MEDS BY ELIG CLIN: HCPCS | Performed by: INTERNAL MEDICINE

## 2020-11-30 PROCEDURE — 2580000003 HC RX 258: Performed by: INTERNAL MEDICINE

## 2020-11-30 PROCEDURE — 99215 OFFICE O/P EST HI 40 MIN: CPT | Performed by: INTERNAL MEDICINE

## 2020-11-30 RX ORDER — HEPARIN SODIUM (PORCINE) LOCK FLUSH IV SOLN 100 UNIT/ML 100 UNIT/ML
500 SOLUTION INTRAVENOUS PRN
Status: DISCONTINUED | OUTPATIENT
Start: 2020-11-30 | End: 2020-12-01 | Stop reason: HOSPADM

## 2020-11-30 RX ORDER — SODIUM CHLORIDE 9 MG/ML
20 INJECTION, SOLUTION INTRAVENOUS ONCE
Status: COMPLETED | OUTPATIENT
Start: 2020-11-30 | End: 2020-11-30

## 2020-11-30 RX ORDER — PALONOSETRON 0.05 MG/ML
0.25 INJECTION, SOLUTION INTRAVENOUS ONCE
Status: COMPLETED | OUTPATIENT
Start: 2020-11-30 | End: 2020-11-30

## 2020-11-30 RX ORDER — SODIUM CHLORIDE 0.9 % (FLUSH) 0.9 %
10 SYRINGE (ML) INJECTION PRN
Status: DISCONTINUED | OUTPATIENT
Start: 2020-11-30 | End: 2020-12-01 | Stop reason: HOSPADM

## 2020-11-30 RX ADMIN — Medication 10 ML: at 09:06

## 2020-11-30 RX ADMIN — Medication 10 ML: at 10:26

## 2020-11-30 RX ADMIN — POTASSIUM CHLORIDE: 2 INJECTION, SOLUTION, CONCENTRATE INTRAVENOUS at 10:51

## 2020-11-30 RX ADMIN — Medication 10 ML: at 17:22

## 2020-11-30 RX ADMIN — POTASSIUM CHLORIDE: 2 INJECTION, SOLUTION, CONCENTRATE INTRAVENOUS at 16:18

## 2020-11-30 RX ADMIN — Medication 10 ML: at 09:05

## 2020-11-30 RX ADMIN — SODIUM CHLORIDE 20 ML/HR: 9 INJECTION, SOLUTION INTRAVENOUS at 10:26

## 2020-11-30 RX ADMIN — FOSAPREPITANT 150 MG: 150 INJECTION, POWDER, LYOPHILIZED, FOR SOLUTION INTRAVENOUS at 12:18

## 2020-11-30 RX ADMIN — MANNITOL: 250 INJECTION, SOLUTION INTRAVENOUS at 13:17

## 2020-11-30 RX ADMIN — Medication 500 UNITS: at 17:22

## 2020-11-30 RX ADMIN — PALONOSETRON 0.25 MG: 0.05 INJECTION, SOLUTION INTRAVENOUS at 10:28

## 2020-11-30 RX ADMIN — DEXAMETHASONE SODIUM PHOSPHATE 20 MG: 4 INJECTION, SOLUTION INTRAMUSCULAR; INTRAVENOUS at 11:56

## 2020-11-30 RX ADMIN — SODIUM CHLORIDE 995 MG: 9 INJECTION, SOLUTION INTRAVENOUS at 12:53

## 2020-11-30 NOTE — PROGRESS NOTES
Patient assessed for the following post chemotherapy:    Dizziness   No  Lightheadedness  No      Acute nausea/vomiting No  Headache   No  Chest pain/pressure  No  Rash/itching   No  Shortness of breath  No    Patient kept for 20 minutes observation post infusion chemotherapy. Patient tolerated chemotherapy treatment alimta and cisplatin without any complications. Last vital signs:   /77   Pulse 95   Temp 98.8 °F (37.1 °C) (Oral)   Resp 18   Ht 6' (1.829 m)   Wt (!) 301 lb 12.8 oz (136.9 kg)   SpO2 95%   BMI 40.93 kg/m²       Patient instructed if experience any of the above symptoms following today's infusion, he is to notify MD immediately or go to the emergency department. Discharge instructions given to patient. Verbalizes understanding. Ambulated off unit per self with belongings.

## 2020-11-30 NOTE — PLAN OF CARE
Problem: Infection - Central Venous Catheter-Associated Bloodstream Infection:  Goal: Will show no infection signs and symptoms  Description: Will show no infection signs and symptoms  Outcome: Met This Shift  Note: Mediport site with no redness or warmth. Skin over port site intact with no signs of breakdown noted. Patient verbalizes signs/symptoms of port infection and when to notify the physician. Intervention: Infection risk assessment  Note: Discuss port maintenance, infection prevention, signs of infection, and when to call the doctor. Problem: Musculor/Skeletal Functional Status  Goal: Absence of falls  Outcome: Met This Shift  Note: Patient free of falls this visit. Intervention: Fall precautions  Note: Fall risks assessed. Precautions discussed. Call light within reach during visit. Problem: Intellectual/Education/Knowledge Deficit  Goal: Teaching initiated upon admission  Outcome: Met This Shift  Note: Patient verbalizes understanding to verbal information given on alimta and cisplatin, including action and possible side effects. Aware to call MD if develop complications.     Intervention: Verbal/written education provided  Note: Chemotherapy Teaching     What is Chemotherapy   Drug action [x]   Method of Administration [x]   Handouts given []     Side Effects  Nausea/vomiting [x]   Diarrhea [x]   Fatigue [x]   Signs / Symptoms of infection [x]   Neutropenia [x]   Thrombocytopenia [x]   Alopecia [x]   neuropathy [x]   Lamy diet &  the importance of fluids [x]       Micellaneous  Importance of nutrition [x]   Importance of oral hygiene [x]   When to call the MD [x]   Monitoring labs [x]   Use of supportive services []     Explanation of Drug Regimen / Frequency  Alimta and cisplatin     Comments  Verbalized understanding to drug,action,side effects and when to call MD         Problem: Discharge Planning  Goal: Knowledge of discharge instructions  Description: Knowledge of discharge instructions  Outcome: Met This Shift  Note: Patient verbalizes understanding of discharge instructions, follow up appointment, and when to call physician if needed   Intervention: Interaction with patient/family and care team  Note: Discharge instructions given to pt and reviewed. Follow up appointments discussed. Care plan reviewed with patient. Patient verbalizes understanding of the plan of care and contributes to goal setting.

## 2020-11-30 NOTE — ONCOLOGY
Chemotherapy Administration    Pre-assessment Data: Antineoplastic Agents  Other:   See toxicity flow sheet for assessment [x]     Physician Notification of Concerns Related to Chemotherapy Administration:   Physician Notified Vernida Dena / Time of Notification      Interventions:   Lab work assessed  [x]   Height / Weight verified for dose [x]   Current MAR reviewed [x]   Emergency drugs available as appropriate [x]   Anaphylaxis assessment completed [x]   Pre-medications administered as ordered [x]   Blood return noted upon initiation of chemotherapy [x]   Blood return noted each 1-2ml of a vesicant medication if given IV push []   Blood return noted each 2-3ml of a non-vesicant medication if given IV push []   Monitor for signs / symptoms of hypersensitivity reaction [x]   Chemotherapy orders (drug/dose/rate) verified by 2 Chemo certified RNs [x]   Monitor IV site and blood return throughout the infusion of the medication [x]   Document IV site checks on the IV assessment form [x]   Document chemotherapy teaching on the Patient Education tab [x]   Document patient verbalizes understanding of medications being administered [x]   If IV infiltration, see ONS Guidelines []   Other:      []

## 2020-12-02 ENCOUNTER — HOSPITAL ENCOUNTER (OUTPATIENT)
Dept: INFUSION THERAPY | Age: 60
Discharge: HOME | End: 2020-12-02
Payer: COMMERCIAL

## 2020-12-02 VITALS
HEART RATE: 77 BPM | SYSTOLIC BLOOD PRESSURE: 118 MMHG | RESPIRATION RATE: 18 BRPM | OXYGEN SATURATION: 100 % | TEMPERATURE: 97.8 F | DIASTOLIC BLOOD PRESSURE: 73 MMHG

## 2020-12-02 DIAGNOSIS — C34.91 ADENOCARCINOMA, LUNG, RIGHT (HCC): ICD-10-CM

## 2020-12-02 DIAGNOSIS — C82.58 DIFFUSE FOLLICLE CENTER LYMPHOMA OF LYMPH NODES OF MULTIPLE REGIONS (HCC): Primary | ICD-10-CM

## 2020-12-02 DIAGNOSIS — Z51.11 ENCOUNTER FOR CHEMOTHERAPY MANAGEMENT: ICD-10-CM

## 2020-12-02 PROCEDURE — 99211 OFF/OP EST MAY X REQ PHY/QHP: CPT

## 2020-12-02 PROCEDURE — 2580000003 HC RX 258: Performed by: INTERNAL MEDICINE

## 2020-12-02 PROCEDURE — 96361 HYDRATE IV INFUSION ADD-ON: CPT

## 2020-12-02 PROCEDURE — 6360000002 HC RX W HCPCS: Performed by: INTERNAL MEDICINE

## 2020-12-02 PROCEDURE — 96360 HYDRATION IV INFUSION INIT: CPT

## 2020-12-02 RX ORDER — 0.9 % SODIUM CHLORIDE 0.9 %
1000 INTRAVENOUS SOLUTION INTRAVENOUS ONCE
Status: COMPLETED | OUTPATIENT
Start: 2020-12-02 | End: 2020-12-02

## 2020-12-02 RX ORDER — SODIUM CHLORIDE 0.9 % (FLUSH) 0.9 %
20 SYRINGE (ML) INJECTION PRN
Status: CANCELLED | OUTPATIENT
Start: 2020-12-02

## 2020-12-02 RX ORDER — SODIUM CHLORIDE 0.9 % (FLUSH) 0.9 %
10 SYRINGE (ML) INJECTION PRN
Status: CANCELLED | OUTPATIENT
Start: 2020-12-02

## 2020-12-02 RX ORDER — SODIUM CHLORIDE 0.9 % (FLUSH) 0.9 %
10 SYRINGE (ML) INJECTION PRN
Status: DISCONTINUED | OUTPATIENT
Start: 2020-12-02 | End: 2020-12-03 | Stop reason: HOSPADM

## 2020-12-02 RX ORDER — HEPARIN SODIUM (PORCINE) LOCK FLUSH IV SOLN 100 UNIT/ML 100 UNIT/ML
500 SOLUTION INTRAVENOUS PRN
Status: CANCELLED | OUTPATIENT
Start: 2020-12-02

## 2020-12-02 RX ORDER — SODIUM CHLORIDE 0.9 % (FLUSH) 0.9 %
20 SYRINGE (ML) INJECTION PRN
Status: DISCONTINUED | OUTPATIENT
Start: 2020-12-02 | End: 2020-12-03 | Stop reason: HOSPADM

## 2020-12-02 RX ORDER — HEPARIN SODIUM (PORCINE) LOCK FLUSH IV SOLN 100 UNIT/ML 100 UNIT/ML
500 SOLUTION INTRAVENOUS PRN
Status: DISCONTINUED | OUTPATIENT
Start: 2020-12-02 | End: 2020-12-03 | Stop reason: HOSPADM

## 2020-12-02 RX ADMIN — Medication 20 ML: at 14:30

## 2020-12-02 RX ADMIN — SODIUM CHLORIDE 1000 ML: 9 INJECTION, SOLUTION INTRAVENOUS at 14:30

## 2020-12-02 RX ADMIN — Medication 10 ML: at 16:33

## 2020-12-02 RX ADMIN — Medication 500 UNITS: at 16:33

## 2020-12-02 NOTE — PROGRESS NOTES
Patient assessed for the following post IV fluids:    Dizziness   No  Lightheadedness  No      Acute nausea/vomiting No  Headache   No  Chest pain/pressure  No  Rash/itching   No  Shortness of breath  No      Patient tolerated IV fluids without any complications. Last vital signs:   /73   Pulse 77   Temp 97.8 °F (36.6 °C) (Oral)   Resp 18   SpO2 100%     Patient instructed if experience any of the above symptoms following today's infusion,he is to notify MD immediately or go to the emergency department. Discharge instructions given to patient. Verbalizes understanding. Ambulated off unit per self with belongings.

## 2020-12-02 NOTE — PLAN OF CARE
Problem: Infection - Central Venous Catheter-Associated Bloodstream Infection:  Goal: Will show no infection signs and symptoms  Description: Will show no infection signs and symptoms  Outcome: Met This Shift  Note: Mediport site with no redness or warmth. Skin over port site intact with no signs of breakdown noted. Patient verbalizes signs/symptoms of port infection and when to notify the physician. Intervention: Infection risk assessment  Note: Discuss port maintenance, infection prevention, signs of infection, and when to call the doctor. Problem: Musculor/Skeletal Functional Status  Goal: Absence of falls  Outcome: Met This Shift  Note: Patient free of falls this visit. Intervention: Fall precautions  Note: Fall risks assessed. Precautions discussed. Call light within reach during visit. Problem: Intellectual/Education/Knowledge Deficit  Goal: Teaching initiated upon admission  Outcome: Met This Shift  Note: Patient verbalizes understanding to verbal information given on IV hydration, including action and possible side effects. Aware to call MD if develop complications. Intervention: Verbal/written education provided  Note: Verbal education provided on IV hydration prior to administration. Patient encouraged to increase PO fluid intake. Problem: Discharge Planning  Goal: Knowledge of discharge instructions  Description: Knowledge of discharge instructions  Outcome: Met This Shift  Note: Patient verbalizes understanding of discharge instructions, follow up appointment, and when to call physician if needed   Intervention: Interaction with patient/family and care team  Note: Discharge instructions given to pt and reviewed. Follow up appointments discussed. Care plan reviewed with patient. Patient verbalizes understanding of the plan of care and contributes to goal setting.

## 2020-12-06 NOTE — PROGRESS NOTES
[facility]  CANCER NETWORK OF [facility]  [facility] [address] [facility] 98  [address] [facility] [address] [facility] [address]  Dept: [phone]  Dept Fax: [phone]  Loc: [phone]     Subjective:      Chief Complaint:  Nabil Barnes is a 56 y.o. with lymphoma and adenocarcinoma of the lung. HPI:  Nabil has a history of non-Hodgkin's lymphoma as well as a more recent history of adenocarcinoma of the lung. He underwent surgical resection of his lung cancer by Dr. Griselda Canton and was found to have early stage disease. The patient has initiated adjuvant chemotherapy treatment with cisplatin and Alimta. Unfortunately, he has developed significant nausea with the first course of treatment. He continues to have nausea despite is been 3 weeks since his last chemotherapy treatment. The patient has been using Phenergan at home. However he has been having difficulty keeping the Phenergan ingested as he has had significant nausea with vomiting. We discussed using Phenergan suppositories with the patient today and he is willing to proceed. The patient's chemotherapy treatment will be delayed by at least 1 week due to his ongoing nausea. He has not had fever, cough, shortness of breath or other signs of infection. The patient's bowel and bladder habits have been normal.  He has not seen blood in his stool or urine. The patient remains active with an ECOG performance status of level 0. PMH, SH, and FH:  I reviewed the patients medication list and allergy list as noted on the electronic medical record. The PMH, SH and FH were also reviewed as noted on the EMR. Review of Systems  Constitutional: Negative. HENT: Negative. Eyes: Negative. Respiratory: Negative. Cardiovascular: Negative. Gastrointestinal: Nausea, vomiting. Genitourinary: Negative. Musculoskeletal: Negative. Skin: Negative. Neurological: Negative.    Hematological: Negative. Psychiatric/Behavioral: Negative. Objective:   Physical Exam  Vitals:    11/23/20 0930   BP: 122/73   Pulse: 105   Resp: 20   Temp: 98.6 °F (37 °C)   SpO2: 94%   Vitals reviewed and are stable. Constitutional: Well-developed. No acute distress. HENT: Normocephalic and atraumatic. Eyes: Pupils appear equal. No scleral icterus. Neck: Overall appearance is symmetrical. No identifiable masses. Chest: Mediport in place in the upper chest. Appears stable. Pulmonary: Effort normal. No respiratory distress. Cardiovascular: No edema in any of the four extremities. Abdominal: Soft. No hepatomegaly or splenomegaly. Musculoskeletal: Gait is normal. Muscle strength and tone grossly good. Neurological: Alert and oriented to person, place, and time. Judgment and thought content normal.  Skin: Skin is warm and dry. No rash. Psychiatric: Mood and affect appropriate for the clinical situation. Data Analysis:    Hematology 11/23/2020 11/2/2020 10/5/2020   WBC 4.5 (L) 5.9 6.0   RBC 3.93 (L) 4.56 (L) 4.65 (L)   HGB 11.9 (L) 13.5 (L) 13.8 (L)   HCT 35.8 (L) 40.9 (L) 42.0   MCV 91.1 90 90.3   RDW  14.9 (H)     195 234     Assessment:   1. Follicular lymphoma stage III. 2.  Adenocarcinoma of the lung. 3.  Leukopenia. 4.  Chronic anemia. 5.  Nausea/vomiting. 6.  Chemotherapy encounter. Plan:   1. Hold chemotherapy with cisplatin and Alimta for 1 week secondary to nausea and vomiting. 2.  Phenergan suppositories for nausea and vomiting. 3.  Monitor total WBC count and for signs of infection/fever. 4.  Monitor hemoglobin/hematocrit and for any signs of blood loss. 5.  Monitor for recurrence of either lymphoma or lung cancer. Ellamae Burkitt M.D.                                                                          Medical Director: [facility]  [facility], 1 [facility], [address] [place] [address], [address] [place] [address], [address] of the [facility] at the [facility]      **This report has been created using voice recognition software. It may contain minor errors which are inherent in voice recognition technology. **

## 2020-12-07 ENCOUNTER — TELEPHONE (OUTPATIENT)
Dept: ONCOLOGY | Age: 60
End: 2020-12-07

## 2020-12-07 ENCOUNTER — HOSPITAL ENCOUNTER (OUTPATIENT)
Dept: INFUSION THERAPY | Age: 60
Discharge: HOME | End: 2020-12-07
Payer: COMMERCIAL

## 2020-12-07 VITALS
BODY MASS INDEX: 40.55 KG/M2 | OXYGEN SATURATION: 95 % | DIASTOLIC BLOOD PRESSURE: 75 MMHG | RESPIRATION RATE: 18 BRPM | HEART RATE: 86 BPM | SYSTOLIC BLOOD PRESSURE: 134 MMHG | WEIGHT: 299 LBS | TEMPERATURE: 98.5 F

## 2020-12-07 DIAGNOSIS — C82.58 DIFFUSE FOLLICLE CENTER LYMPHOMA OF LYMPH NODES OF MULTIPLE REGIONS (HCC): Primary | ICD-10-CM

## 2020-12-07 DIAGNOSIS — Z51.11 ENCOUNTER FOR CHEMOTHERAPY MANAGEMENT: ICD-10-CM

## 2020-12-07 PROCEDURE — 2580000003 HC RX 258: Performed by: INTERNAL MEDICINE

## 2020-12-07 PROCEDURE — 96361 HYDRATE IV INFUSION ADD-ON: CPT

## 2020-12-07 PROCEDURE — 96365 THER/PROPH/DIAG IV INF INIT: CPT

## 2020-12-07 PROCEDURE — 6360000002 HC RX W HCPCS: Performed by: INTERNAL MEDICINE

## 2020-12-07 PROCEDURE — 96360 HYDRATION IV INFUSION INIT: CPT

## 2020-12-07 PROCEDURE — 99211 OFF/OP EST MAY X REQ PHY/QHP: CPT

## 2020-12-07 RX ORDER — 0.9 % SODIUM CHLORIDE 0.9 %
1000 INTRAVENOUS SOLUTION INTRAVENOUS ONCE
Status: COMPLETED | OUTPATIENT
Start: 2020-12-07 | End: 2020-12-07

## 2020-12-07 RX ORDER — SODIUM CHLORIDE 0.9 % (FLUSH) 0.9 %
20 SYRINGE (ML) INJECTION PRN
Status: DISCONTINUED | OUTPATIENT
Start: 2020-12-07 | End: 2020-12-08 | Stop reason: HOSPADM

## 2020-12-07 RX ORDER — DIPHENHYDRAMINE HYDROCHLORIDE 50 MG/ML
50 INJECTION INTRAMUSCULAR; INTRAVENOUS ONCE
Status: CANCELLED | OUTPATIENT
Start: 2020-12-08

## 2020-12-07 RX ORDER — DEXTROSE MONOHYDRATE 50 MG/ML
INJECTION, SOLUTION INTRAVENOUS ONCE
Status: CANCELLED | OUTPATIENT
Start: 2020-12-08

## 2020-12-07 RX ORDER — SODIUM CHLORIDE 9 MG/ML
INJECTION, SOLUTION INTRAVENOUS ONCE
Status: CANCELLED | OUTPATIENT
Start: 2020-12-08

## 2020-12-07 RX ORDER — SODIUM CHLORIDE 0.9 % (FLUSH) 0.9 %
10 SYRINGE (ML) INJECTION PRN
Status: CANCELLED | OUTPATIENT
Start: 2020-12-07

## 2020-12-07 RX ORDER — HEPARIN SODIUM (PORCINE) LOCK FLUSH IV SOLN 100 UNIT/ML 100 UNIT/ML
500 SOLUTION INTRAVENOUS PRN
Status: CANCELLED | OUTPATIENT
Start: 2020-12-07

## 2020-12-07 RX ORDER — SODIUM CHLORIDE 0.9 % (FLUSH) 0.9 %
5 SYRINGE (ML) INJECTION PRN
Status: CANCELLED | OUTPATIENT
Start: 2020-12-08

## 2020-12-07 RX ORDER — HEPARIN SODIUM (PORCINE) LOCK FLUSH IV SOLN 100 UNIT/ML 100 UNIT/ML
500 SOLUTION INTRAVENOUS PRN
Status: DISCONTINUED | OUTPATIENT
Start: 2020-12-07 | End: 2020-12-08 | Stop reason: HOSPADM

## 2020-12-07 RX ORDER — SODIUM CHLORIDE 9 MG/ML
INJECTION, SOLUTION INTRAVENOUS CONTINUOUS
Status: CANCELLED | OUTPATIENT
Start: 2020-12-08

## 2020-12-07 RX ORDER — METHYLPREDNISOLONE SODIUM SUCCINATE 125 MG/2ML
125 INJECTION, POWDER, LYOPHILIZED, FOR SOLUTION INTRAMUSCULAR; INTRAVENOUS ONCE
Status: CANCELLED | OUTPATIENT
Start: 2020-12-08

## 2020-12-07 RX ORDER — SODIUM CHLORIDE 0.9 % (FLUSH) 0.9 %
20 SYRINGE (ML) INJECTION PRN
Status: CANCELLED | OUTPATIENT
Start: 2020-12-07

## 2020-12-07 RX ADMIN — Medication 10 ML: at 12:09

## 2020-12-07 RX ADMIN — Medication 500 UNITS: at 14:30

## 2020-12-07 RX ADMIN — Medication 10 ML: at 14:30

## 2020-12-07 RX ADMIN — DEXAMETHASONE SODIUM PHOSPHATE 20 MG: 4 INJECTION, SOLUTION INTRAMUSCULAR; INTRAVENOUS at 12:21

## 2020-12-07 RX ADMIN — SODIUM CHLORIDE 1000 ML: 9 INJECTION, SOLUTION INTRAVENOUS at 12:09

## 2020-12-07 NOTE — PLAN OF CARE
Problem: Intellectual/Education/Knowledge Deficit  Goal: Teaching initiated upon admission  Outcome: Met This Shift  Note: Reviewed orders for hydration and  the importance of drinking at least 48 ounces  Goal: Written Disposition Instruction form completed  Outcome: Met This Shift  Note: Discharge instructions given and reviewed with patient. All questions answered. Patient verbalized understanding   Intervention: Verbal/written education provided  Note: Discharge instruction sheets      Problem: Discharge Planning  Goal: Knowledge of discharge instructions  Description: Knowledge of discharge instructions  Outcome: Met This Shift  Note: Patient  able to teach back follow up appointments and when to call the doctor. Patient offers no questions at this time   Intervention: Interaction with patient/family and care team  Note: All questions and concerns addressed      Problem: Infection - Central Venous Catheter-Associated Bloodstream Infection:  Goal: Will show no infection signs and symptoms  Description: Will show no infection signs and symptoms  Outcome: Met This Shift  Note: No signs of infection noted at [facility] site    Intervention: Central line needs assessment  Note:  Patient currently under going chemotherapy with poor venous access   Intervention: Infection risk assessment  Note: Patient aware that is of increased risk for infection due to receiving chemotherapy and having a central venous catheter. Patient aware of signs and symptoms of infection and when to call the doctor      Problem: Musculor/Skeletal Functional Status  Goal: Absence of falls  Outcome: Met This Shift  Note: No falls this admission   Intervention: Fall precautions  Note: No falls this admission    Care plan reviewed with patient and she verbalized understanding of the plan of care and contributed to goal setting.

## 2020-12-07 NOTE — TELEPHONE ENCOUNTER
Patient would like to come in for fluids today. Patient is feeling bad and can not keep anything down.     Please advise

## 2020-12-07 NOTE — PROGRESS NOTES
Patient assessed for the following post hydration :    Dizziness   No  Lightheadedness  No      Acute nausea/vomiting No  Headache   No  Chest pain/pressure  No  Rash/itching   No  Shortness of breath  No    Patient drank a half a bottle of water but did not eat anything or use the rest room while here . Patient tolerated hydration fluids without any complications. Last vital signs:   /75   Pulse 86   Temp 98.5 °F (36.9 °C) (Oral)   Resp 18   Wt 299 lb (135.6 kg)   SpO2 95%   BMI 40.55 kg/m²       Patient instructed if experience any of the above symptoms following today's infusion,he is to notify MD immediately or go to the emergency department. Discharge instructions given to patient. Verbalizes understanding. Ambulated off unit per self  with belongings. patient to return in am for fluids

## 2020-12-08 ENCOUNTER — HOSPITAL ENCOUNTER (OUTPATIENT)
Dept: INFUSION THERAPY | Age: 60
Discharge: HOME | End: 2020-12-08
Payer: COMMERCIAL

## 2020-12-08 VITALS
HEART RATE: 88 BPM | DIASTOLIC BLOOD PRESSURE: 81 MMHG | OXYGEN SATURATION: 98 % | TEMPERATURE: 97.8 F | SYSTOLIC BLOOD PRESSURE: 113 MMHG | RESPIRATION RATE: 18 BRPM

## 2020-12-08 DIAGNOSIS — C82.58 DIFFUSE FOLLICLE CENTER LYMPHOMA OF LYMPH NODES OF MULTIPLE REGIONS (HCC): Primary | ICD-10-CM

## 2020-12-08 PROCEDURE — 2580000003 HC RX 258: Performed by: INTERNAL MEDICINE

## 2020-12-08 PROCEDURE — 96365 THER/PROPH/DIAG IV INF INIT: CPT

## 2020-12-08 PROCEDURE — 96361 HYDRATE IV INFUSION ADD-ON: CPT

## 2020-12-08 PROCEDURE — 6360000002 HC RX W HCPCS: Performed by: INTERNAL MEDICINE

## 2020-12-08 RX ORDER — 0.9 % SODIUM CHLORIDE 0.9 %
1000 INTRAVENOUS SOLUTION INTRAVENOUS ONCE
Status: COMPLETED | OUTPATIENT
Start: 2020-12-08 | End: 2020-12-08

## 2020-12-08 RX ORDER — SODIUM CHLORIDE 0.9 % (FLUSH) 0.9 %
10 SYRINGE (ML) INJECTION PRN
Status: DISCONTINUED | OUTPATIENT
Start: 2020-12-08 | End: 2020-12-09 | Stop reason: HOSPADM

## 2020-12-08 RX ORDER — HEPARIN SODIUM (PORCINE) LOCK FLUSH IV SOLN 100 UNIT/ML 100 UNIT/ML
500 SOLUTION INTRAVENOUS PRN
Status: DISCONTINUED | OUTPATIENT
Start: 2020-12-08 | End: 2020-12-09 | Stop reason: HOSPADM

## 2020-12-08 RX ADMIN — Medication 10 ML: at 12:55

## 2020-12-08 RX ADMIN — DEXAMETHASONE SODIUM PHOSPHATE 20 MG: 4 INJECTION, SOLUTION INTRAMUSCULAR; INTRAVENOUS at 13:01

## 2020-12-08 RX ADMIN — Medication 500 UNITS: at 15:18

## 2020-12-08 RX ADMIN — SODIUM CHLORIDE 1000 ML: 9 INJECTION, SOLUTION INTRAVENOUS at 12:55

## 2020-12-08 RX ADMIN — Medication 10 ML: at 15:18

## 2020-12-08 NOTE — PLAN OF CARE
Problem: Infection - Central Venous Catheter-Associated Bloodstream Infection:  Goal: Will show no infection signs and symptoms  Description: Will show no infection signs and symptoms  Outcome: Met This Shift  Note: Mediport site with no redness or warmth. Skin over port intact with no signs of breakdown noted. Patient verbalizes signs/symptoms of port infection and when to notify the physician. Intervention: Infection risk assessment  Note: Discussed port maintenance, infection prevention, signs and when to call the doctor     Problem: Musculor/Skeletal Functional Status  Goal: Absence of falls  Outcome: Met This Shift  Note: Patient verbalizes understanding of fall precautions. Patient free from falls this visit. Intervention: Fall precautions  Note: Discussed fall precautions, call light within reach. Problem: Intellectual/Education/Knowledge Deficit  Goal: Teaching initiated upon admission  Outcome: Met This Shift  Note: Patient verbalizes understanding to verbal information given on IV fluids and decadron,action and possible side effects. Aware to call MD if develop complications. Intervention: Verbal/written education provided  Note: Discussed IV fluids and decadron action, possible side effects and when to call the doctor. Problem: Discharge Planning  Goal: Knowledge of discharge instructions  Description: Knowledge of discharge instructions  Outcome: Met This Shift  Note: Verbalized understanding of discharge instructions, follow-up appointments, and when to call the physician. Intervention: Discharge to appropriate level of care  Note: Discuss discharge instructions, follow ups, and when to call the doctor. Care plan reviewed with patient. Patient verbalizes understanding of the plan of care and contribute to goal setting.

## 2020-12-08 NOTE — PROGRESS NOTES
Patient assessed for the following post IV fluids:    Dizziness   No  Lightheadedness  No      Acute nausea/vomiting No  Headache   No  Chest pain/pressure  No  Rash/itching   No  Shortness of breath  No    Patient tolerated IV fluids and decadron without any complications. Last vital signs:   /81   Pulse 88   Temp 97.8 °F (36.6 °C) (Oral)   Resp 18   SpO2 98%     Patient instructed if experience any of the above symptoms following today's infusion,he is to notify MD immediately or go to the emergency department. Discharge instructions given to patient. Verbalizes understanding. Ambulated off unit per self with belongings.

## 2020-12-09 ENCOUNTER — HOSPITAL ENCOUNTER (OUTPATIENT)
Dept: INFUSION THERAPY | Age: 60
Discharge: HOME | End: 2020-12-09
Payer: COMMERCIAL

## 2020-12-09 VITALS
HEART RATE: 74 BPM | WEIGHT: 304.2 LBS | RESPIRATION RATE: 18 BRPM | SYSTOLIC BLOOD PRESSURE: 119 MMHG | DIASTOLIC BLOOD PRESSURE: 67 MMHG | BODY MASS INDEX: 41.26 KG/M2 | TEMPERATURE: 98.1 F | OXYGEN SATURATION: 98 %

## 2020-12-09 DIAGNOSIS — C82.58 DIFFUSE FOLLICLE CENTER LYMPHOMA OF LYMPH NODES OF MULTIPLE REGIONS (HCC): Primary | ICD-10-CM

## 2020-12-09 PROCEDURE — 96365 THER/PROPH/DIAG IV INF INIT: CPT

## 2020-12-09 PROCEDURE — 96361 HYDRATE IV INFUSION ADD-ON: CPT

## 2020-12-09 PROCEDURE — 2580000003 HC RX 258: Performed by: INTERNAL MEDICINE

## 2020-12-09 PROCEDURE — 6360000002 HC RX W HCPCS: Performed by: INTERNAL MEDICINE

## 2020-12-09 RX ORDER — DEXTROSE MONOHYDRATE 50 MG/ML
INJECTION, SOLUTION INTRAVENOUS ONCE
Status: CANCELLED | OUTPATIENT
Start: 2020-12-09

## 2020-12-09 RX ORDER — DIPHENHYDRAMINE HYDROCHLORIDE 50 MG/ML
50 INJECTION INTRAMUSCULAR; INTRAVENOUS ONCE
Status: CANCELLED | OUTPATIENT
Start: 2020-12-09

## 2020-12-09 RX ORDER — SODIUM CHLORIDE 9 MG/ML
INJECTION, SOLUTION INTRAVENOUS ONCE
Status: CANCELLED | OUTPATIENT
Start: 2020-12-09

## 2020-12-09 RX ORDER — HEPARIN SODIUM (PORCINE) LOCK FLUSH IV SOLN 100 UNIT/ML 100 UNIT/ML
500 SOLUTION INTRAVENOUS PRN
Status: DISCONTINUED | OUTPATIENT
Start: 2020-12-09 | End: 2020-12-10 | Stop reason: HOSPADM

## 2020-12-09 RX ORDER — SODIUM CHLORIDE 0.9 % (FLUSH) 0.9 %
10 SYRINGE (ML) INJECTION PRN
Status: DISCONTINUED | OUTPATIENT
Start: 2020-12-09 | End: 2020-12-10 | Stop reason: HOSPADM

## 2020-12-09 RX ORDER — METHYLPREDNISOLONE SODIUM SUCCINATE 125 MG/2ML
125 INJECTION, POWDER, LYOPHILIZED, FOR SOLUTION INTRAMUSCULAR; INTRAVENOUS ONCE
Status: CANCELLED | OUTPATIENT
Start: 2020-12-09

## 2020-12-09 RX ORDER — SODIUM CHLORIDE 0.9 % (FLUSH) 0.9 %
5 SYRINGE (ML) INJECTION PRN
Status: CANCELLED | OUTPATIENT
Start: 2020-12-09

## 2020-12-09 RX ORDER — 0.9 % SODIUM CHLORIDE 0.9 %
1000 INTRAVENOUS SOLUTION INTRAVENOUS ONCE
Status: COMPLETED | OUTPATIENT
Start: 2020-12-09 | End: 2020-12-09

## 2020-12-09 RX ORDER — SODIUM CHLORIDE 9 MG/ML
INJECTION, SOLUTION INTRAVENOUS CONTINUOUS
Status: CANCELLED | OUTPATIENT
Start: 2020-12-09

## 2020-12-09 RX ADMIN — Medication 10 ML: at 17:01

## 2020-12-09 RX ADMIN — Medication 10 ML: at 14:45

## 2020-12-09 RX ADMIN — SODIUM CHLORIDE 1000 ML: 9 INJECTION, SOLUTION INTRAVENOUS at 14:45

## 2020-12-09 RX ADMIN — DEXAMETHASONE SODIUM PHOSPHATE 20 MG: 4 INJECTION, SOLUTION INTRAMUSCULAR; INTRAVENOUS at 15:30

## 2020-12-09 RX ADMIN — Medication 500 UNITS: at 17:01

## 2020-12-09 NOTE — PROGRESS NOTES
Patient had peanut butter crackers and one bottle of water while here. Patient was up to the bathroom once and had one liquid stool.  Patient verbalized understanding to call if starts to feel worse again and thinks he wants iv fluids

## 2020-12-09 NOTE — PLAN OF CARE
Care plan reviewed with patient. Patient verbalized understanding of the plan of care and contribute to goal setting. Problem: Intellectual/Education/Knowledge Deficit  Goal: Teaching initiated upon admission  Outcome: Met This Shift  Note: Patient verbalizes understanding to verbal information given on hydration and decadron,action and possible side effects. Aware to call MD if develop complications. Intervention: Verbal/written education provided  Note: Discuss hydration and decadron     Problem: Discharge Planning  Goal: Knowledge of discharge instructions  Description: Knowledge of discharge instructions  Outcome: Met This Shift  Note: Verbalized understanding of discharge instructions, follow ups and when to call doctor   Intervention: Discharge to appropriate level of care  Note: Discuss discharge instructions, follow ups and when to call doctor. Problem: Falls - Risk of:  Goal: Will remain free from falls  Description: Will remain free from falls  Outcome: Met This Shift  Note: Free from falls while in infusion center. Verbalized understanding of fall prevention and to ask for assistance with ambulation   Intervention: Assess risk factors for falls  Description: Assess risk factors for falls  Note: Assess for fall risk, instruct to ask for assistance with ambulation      Problem: Infection - Central Venous Catheter-Associated Bloodstream Infection:  Goal: Will show no infection signs and symptoms  Description: Will show no infection signs and symptoms  Outcome: Met This Shift  Note: Mediport site with no redness or warmth. Skin over port intact with no signs of breakdown noted. Patient verbalizes signs/symptoms of port infection and when to notify the physician.     Intervention: Infection risk assessment  Description: Infection risk assessment  Note: Discuss port maintenance, infection prevention, signs and when to call

## 2020-12-09 NOTE — PROGRESS NOTES
Patient assessed for the following post hydration and decadron:    Dizziness   No  Lightheadedness  No      Acute nausea/vomiting No  Headache   No  Chest pain/pressure  No  Rash/itching   No  Shortness of breath  No    Patient kept for 20 minutes observation post infusion. Patient tolerated normal saline and decadron without any complications. Last vital signs:   /67   Pulse 74   Temp 98.1 °F (36.7 °C) (Oral)   Resp 18   Wt (!) 304 lb 3.2 oz (138 kg)   SpO2 98%   BMI 41.26 kg/m²       Patient instructed if experience any of the above symptoms following today's infusion,he is to notify MD immediately or go to the emergency department. Discharge instructions given to patient. Verbalizes understanding. Ambulated off unit per self  with belongings.  Family member will  Be driving him home

## 2020-12-12 NOTE — PROGRESS NOTES
[facility]  CANCER NETWORK OF [facility]  [facility] [address] [facility] 98  [address]  [address]  Dept: [phone]  Dept Fax: [phone]  Loc: [phone]     Subjective:      Chief Complaint:  Nabil Drake is a 56 y.o. with lymphoma and adenocarcinoma of the lung. HPI:  Nabil is here today for follow-up regarding his history of non-small cell adenocarcinoma of the lung as well as a history of non-Hodgkin's lymphoma. He underwent surgical resection for an early stage adenocarcinoma lung by  [facility]. The patient has started adjuvant chemotherapy with cisplatin and Alimta. This is been complicated by nausea and treatment was delayed by 1 week. The patient has been started on multiple antinausea medications and most recently has been using Phenergan suppositories for nausea. He states this has had modest improvement. The patient continues to have nausea but is ready to proceed with his next cycle of chemotherapy treatment today. We will increase the use of Decadron and his antinausea medicines today as well as continue with the use of Aloxi and Emend. The patient has not had fever, cough, shortness of breath or other signs of infection. His bowel and bladder habits have been fairly stable. The patient is active with an ECOG performance status of level 0. He does not have any symptoms that be suggestive recurrence of either his carcinoma of the lung or his lymphoma. She has mild anemia secondary to chemotherapy treatment. He also has a mild thrombocytosis. The patient does not have any abnormal bleeding or bruising. PMH, SH, and FH:  I reviewed the patients medication list and allergy list as noted on the electronic medical record. The PMH, SH and FH were also reviewed as noted on the EMR. Review of Systems  Constitutional: Negative. HENT: Negative. Eyes: Negative. Respiratory: Negative. Cardiovascular: Negative. Gastrointestinal: Nausea. Genitourinary: Negative. Musculoskeletal: Negative. Skin: Negative. Neurological: Negative. Hematological: Negative. Psychiatric/Behavioral: Negative. Objective:   Physical Exam  Vitals:    11/30/20 0919   BP: 129/82   Pulse: 128   Resp: 18   Temp: 98.4 °F (36.9 °C)   SpO2: 94%   Vitals reviewed and are stable. Constitutional: Well-developed. No acute distress. HENT: Normocephalic and atraumatic. Eyes: Pupils appear equal. No scleral icterus. Neck: Overall appearance is symmetrical. No identifiable masses. Chest: Mediport in place in the upper chest. Appears stable. Pulmonary: Effort normal. No respiratory distress. Cardiovascular: No edema in any of the four extremities. Abdominal: Soft. No hepatomegaly or splenomegaly. Musculoskeletal: Gait is normal. Muscle strength and tone grossly good. Neurological: Alert and oriented to person, place, and time. Judgment and thought content normal.  Skin: Skin is warm and dry. No rash. Psychiatric: Mood and affect appropriate for the clinical situation. Data Analysis:    Hematology 11/30/2020 11/23/2020 11/2/2020   WBC 7.6 4.5 (L) 5.9   RBC 4.32 (L) 3.93 (L) 4.56 (L)   HGB 13.1 (L) 11.9 (L) 13.5 (L)   HCT 38.7 (L) 35.8 (L) 40.9 (L)   MCV 90 91.1 90   RDW 16.4 (H)  14.9 (H)    (H) 241 195     Assessment:   1. Follicular lymphoma stage III. 2.  Adenocarcinoma of the lung. 3.  Thrombocytopenia. 4.  Chronic anemia. 5.  Nausea/vomiting. 6.  Chemotherapy encounter. Plan:   1. Proceed with cycle #2 of chemotherapy with cisplatin and Alimta. 2.  Increase Decadron to 20 mg IV on today's treatment. 3.  Monitor platelet count and for any signs of abnormal bleeding/bruising. 4.  Monitor hemoglobin/hematocrit and for any signs of blood loss. 5.  Monitor for recurrence of either lymphoma or lung cancer. Estrellita Mccallum M.D. Medical Director: [facility]  Cancer Network [place] [address], 1 [facility], [address] [place] [address], [address] [place] [address], [address] [place] [address] of the [facility] at the [facility]      **This report has been created using voice recognition software. It may contain minor errors which are inherent in voice recognition technology. **

## 2020-12-20 RX ORDER — SODIUM CHLORIDE 9 MG/ML
INJECTION, SOLUTION INTRAVENOUS CONTINUOUS
Status: CANCELLED | OUTPATIENT
Start: 2021-01-06

## 2020-12-20 RX ORDER — SODIUM CHLORIDE 0.9 % (FLUSH) 0.9 %
10 SYRINGE (ML) INJECTION PRN
Status: CANCELLED | OUTPATIENT
Start: 2021-01-06

## 2020-12-20 RX ORDER — METHYLPREDNISOLONE SODIUM SUCCINATE 125 MG/2ML
125 INJECTION, POWDER, LYOPHILIZED, FOR SOLUTION INTRAMUSCULAR; INTRAVENOUS ONCE
Status: CANCELLED | OUTPATIENT
Start: 2021-01-06 | End: 2020-12-21

## 2020-12-20 RX ORDER — PALONOSETRON 0.05 MG/ML
0.25 INJECTION, SOLUTION INTRAVENOUS ONCE
Status: CANCELLED | OUTPATIENT
Start: 2021-01-06

## 2020-12-20 RX ORDER — SODIUM CHLORIDE 0.9 % (FLUSH) 0.9 %
5 SYRINGE (ML) INJECTION PRN
Status: CANCELLED | OUTPATIENT
Start: 2021-01-06

## 2020-12-20 RX ORDER — DIPHENHYDRAMINE HYDROCHLORIDE 50 MG/ML
50 INJECTION INTRAMUSCULAR; INTRAVENOUS ONCE
Status: CANCELLED | OUTPATIENT
Start: 2021-01-06 | End: 2020-12-21

## 2020-12-20 RX ORDER — CYANOCOBALAMIN 1000 UG/ML
1000 INJECTION INTRAMUSCULAR; SUBCUTANEOUS ONCE
Status: CANCELLED | OUTPATIENT
Start: 2021-01-06 | End: 2020-12-21

## 2020-12-20 RX ORDER — HEPARIN SODIUM (PORCINE) LOCK FLUSH IV SOLN 100 UNIT/ML 100 UNIT/ML
500 SOLUTION INTRAVENOUS PRN
Status: CANCELLED | OUTPATIENT
Start: 2021-01-06

## 2020-12-20 RX ORDER — SODIUM CHLORIDE 9 MG/ML
20 INJECTION, SOLUTION INTRAVENOUS ONCE
Status: CANCELLED | OUTPATIENT
Start: 2021-01-06 | End: 2020-12-21

## 2020-12-21 ENCOUNTER — HOSPITAL ENCOUNTER (OUTPATIENT)
Dept: INFUSION THERAPY | Age: 60
Discharge: HOME | End: 2020-12-21
Payer: COMMERCIAL

## 2020-12-21 ENCOUNTER — OFFICE VISIT (OUTPATIENT)
Dept: ONCOLOGY | Age: 60
End: 2020-12-21
Payer: COMMERCIAL

## 2020-12-21 VITALS
BODY MASS INDEX: 40.8 KG/M2 | WEIGHT: 301.2 LBS | OXYGEN SATURATION: 95 % | HEIGHT: 72 IN | RESPIRATION RATE: 16 BRPM | TEMPERATURE: 98.2 F | HEART RATE: 99 BPM | DIASTOLIC BLOOD PRESSURE: 77 MMHG | SYSTOLIC BLOOD PRESSURE: 134 MMHG

## 2020-12-21 VITALS
TEMPERATURE: 98.1 F | RESPIRATION RATE: 18 BRPM | SYSTOLIC BLOOD PRESSURE: 142 MMHG | HEART RATE: 89 BPM | OXYGEN SATURATION: 98 % | DIASTOLIC BLOOD PRESSURE: 82 MMHG

## 2020-12-21 DIAGNOSIS — C82.58 DIFFUSE FOLLICLE CENTER LYMPHOMA OF LYMPH NODES OF MULTIPLE REGIONS (HCC): ICD-10-CM

## 2020-12-21 DIAGNOSIS — C34.91 ADENOCARCINOMA, LUNG, RIGHT (HCC): Primary | ICD-10-CM

## 2020-12-21 LAB
ABSOLUTE IMMATURE GRANULOCYTE: 0.11 THOU/MM3 (ref 0–0.07)
ALBUMIN SERPL-MCNC: 3.9 G/DL (ref 3.5–5.1)
ALP BLD-CCNC: 59 U/L (ref 38–126)
ALT SERPL-CCNC: 24 U/L (ref 11–66)
AST SERPL-CCNC: 30 U/L (ref 5–40)
BASINOPHIL, AUTOMATED: 1 % (ref 0–3)
BASOPHILS ABSOLUTE: 0.1 THOU/MM3 (ref 0–0.1)
BILIRUB SERPL-MCNC: 0.4 MG/DL (ref 0.3–1.2)
BILIRUBIN DIRECT: < 0.2 MG/DL (ref 0–0.3)
BUN, WHOLE BLOOD: 18 MG/DL (ref 8–26)
CHLORIDE, WHOLE BLOOD: 105 MEQ/L (ref 98–109)
CREATININE, WHOLE BLOOD: 1.3 MG/DL (ref 0.5–1.2)
EOSINOPHILS ABSOLUTE: 0.5 THOU/MM3 (ref 0–0.4)
EOSINOPHILS RELATIVE PERCENT: 7 % (ref 0–4)
GFR, ESTIMATED ,CON: 59 ML/MIN/1.73M2
GLUCOSE, WHOLE BLOOD: 165 MG/DL (ref 70–108)
HCT VFR BLD CALC: 32.7 % (ref 42–52)
HEMOGLOBIN: 11.1 GM/DL (ref 14–18)
IMMATURE GRANULOCYTES: 2 %
IONIZED CALCIUM, WHOLE BLOOD: 1.12 MMOL/L (ref 1.12–1.32)
LYMPHOCYTES # BLD: 18 % (ref 15–47)
LYMPHOCYTES ABSOLUTE: 1.4 THOU/MM3 (ref 1–4.8)
MCH RBC QN AUTO: 30.8 PG (ref 26–33)
MCHC RBC AUTO-ENTMCNC: 33.9 GM/DL (ref 32.2–35.5)
MCV RBC AUTO: 91 FL (ref 80–94)
MONOCYTES ABSOLUTE: 1 THOU/MM3 (ref 0.4–1.3)
MONOCYTES: 13 % (ref 0–12)
PDW BLD-RTO: 16.7 % (ref 11.5–14.5)
PLATELET # BLD: 244 THOU/MM3 (ref 130–400)
PMV BLD AUTO: 10.1 FL (ref 9.4–12.4)
POTASSIUM, WHOLE BLOOD: 3.9 MEQ/L (ref 3.5–4.9)
RBC # BLD: 3.6 MILL/MM3 (ref 4.7–6.1)
SEG NEUTROPHILS: 60 % (ref 43–75)
SEGMENTED NEUTROPHILS ABSOLUTE COUNT: 4.5 THOU/MM3 (ref 1.8–7.7)
SODIUM, WHOLE BLOOD: 140 MEQ/L (ref 138–146)
TOTAL CO2, WHOLE BLOOD: 25 MEQ/L (ref 23–33)
TOTAL PROTEIN: 6.6 G/DL (ref 6.1–8)
WBC # BLD: 7.4 THOU/MM3 (ref 4.8–10.8)

## 2020-12-21 PROCEDURE — 96361 HYDRATE IV INFUSION ADD-ON: CPT

## 2020-12-21 PROCEDURE — 85025 COMPLETE CBC W/AUTO DIFF WBC: CPT

## 2020-12-21 PROCEDURE — G8417 CALC BMI ABV UP PARAM F/U: HCPCS | Performed by: INTERNAL MEDICINE

## 2020-12-21 PROCEDURE — 3017F COLORECTAL CA SCREEN DOC REV: CPT | Performed by: INTERNAL MEDICINE

## 2020-12-21 PROCEDURE — 99215 OFFICE O/P EST HI 40 MIN: CPT | Performed by: INTERNAL MEDICINE

## 2020-12-21 PROCEDURE — 99211 OFF/OP EST MAY X REQ PHY/QHP: CPT

## 2020-12-21 PROCEDURE — 2580000003 HC RX 258: Performed by: INTERNAL MEDICINE

## 2020-12-21 PROCEDURE — G8427 DOCREV CUR MEDS BY ELIG CLIN: HCPCS | Performed by: INTERNAL MEDICINE

## 2020-12-21 PROCEDURE — 36591 DRAW BLOOD OFF VENOUS DEVICE: CPT

## 2020-12-21 PROCEDURE — 80076 HEPATIC FUNCTION PANEL: CPT

## 2020-12-21 PROCEDURE — G8484 FLU IMMUNIZE NO ADMIN: HCPCS | Performed by: INTERNAL MEDICINE

## 2020-12-21 PROCEDURE — 6360000002 HC RX W HCPCS: Performed by: INTERNAL MEDICINE

## 2020-12-21 PROCEDURE — 80047 BASIC METABLC PNL IONIZED CA: CPT

## 2020-12-21 PROCEDURE — 1036F TOBACCO NON-USER: CPT | Performed by: INTERNAL MEDICINE

## 2020-12-21 PROCEDURE — 96365 THER/PROPH/DIAG IV INF INIT: CPT

## 2020-12-21 RX ORDER — 0.9 % SODIUM CHLORIDE 0.9 %
1000 INTRAVENOUS SOLUTION INTRAVENOUS ONCE
Status: CANCELLED | OUTPATIENT
Start: 2020-12-30

## 2020-12-21 RX ORDER — HEPARIN SODIUM (PORCINE) LOCK FLUSH IV SOLN 100 UNIT/ML 100 UNIT/ML
500 SOLUTION INTRAVENOUS PRN
Status: DISCONTINUED | OUTPATIENT
Start: 2020-12-21 | End: 2020-12-22 | Stop reason: HOSPADM

## 2020-12-21 RX ORDER — 0.9 % SODIUM CHLORIDE 0.9 %
1000 INTRAVENOUS SOLUTION INTRAVENOUS ONCE
Status: CANCELLED | OUTPATIENT
Start: 2020-12-28

## 2020-12-21 RX ORDER — HEPARIN SODIUM (PORCINE) LOCK FLUSH IV SOLN 100 UNIT/ML 100 UNIT/ML
500 SOLUTION INTRAVENOUS PRN
Status: CANCELLED | OUTPATIENT
Start: 2020-12-22

## 2020-12-21 RX ORDER — METHYLPREDNISOLONE SODIUM SUCCINATE 125 MG/2ML
125 INJECTION, POWDER, LYOPHILIZED, FOR SOLUTION INTRAMUSCULAR; INTRAVENOUS ONCE
Status: CANCELLED | OUTPATIENT
Start: 2020-12-30 | End: 2020-12-30

## 2020-12-21 RX ORDER — HEPARIN SODIUM (PORCINE) LOCK FLUSH IV SOLN 100 UNIT/ML 100 UNIT/ML
500 SOLUTION INTRAVENOUS PRN
Status: CANCELLED | OUTPATIENT
Start: 2020-12-29

## 2020-12-21 RX ORDER — EPINEPHRINE 1 MG/ML
0.3 INJECTION, SOLUTION, CONCENTRATE INTRAVENOUS PRN
Status: CANCELLED | OUTPATIENT
Start: 2020-12-28

## 2020-12-21 RX ORDER — DEXTROSE MONOHYDRATE 50 MG/ML
INJECTION, SOLUTION INTRAVENOUS ONCE
Status: CANCELLED | OUTPATIENT
Start: 2020-12-22 | End: 2020-12-22

## 2020-12-21 RX ORDER — DIPHENHYDRAMINE HYDROCHLORIDE 50 MG/ML
50 INJECTION INTRAMUSCULAR; INTRAVENOUS ONCE
Status: CANCELLED | OUTPATIENT
Start: 2020-12-22 | End: 2020-12-22

## 2020-12-21 RX ORDER — HEPARIN SODIUM (PORCINE) LOCK FLUSH IV SOLN 100 UNIT/ML 100 UNIT/ML
500 SOLUTION INTRAVENOUS PRN
Status: CANCELLED | OUTPATIENT
Start: 2020-12-28

## 2020-12-21 RX ORDER — EPINEPHRINE 1 MG/ML
0.3 INJECTION, SOLUTION, CONCENTRATE INTRAVENOUS PRN
Status: CANCELLED | OUTPATIENT
Start: 2020-12-30

## 2020-12-21 RX ORDER — DEXTROSE MONOHYDRATE 50 MG/ML
INJECTION, SOLUTION INTRAVENOUS ONCE
Status: CANCELLED | OUTPATIENT
Start: 2020-12-21 | End: 2020-12-21

## 2020-12-21 RX ORDER — SODIUM CHLORIDE 9 MG/ML
INJECTION, SOLUTION INTRAVENOUS CONTINUOUS
Status: CANCELLED | OUTPATIENT
Start: 2020-12-22

## 2020-12-21 RX ORDER — HEPARIN SODIUM (PORCINE) LOCK FLUSH IV SOLN 100 UNIT/ML 100 UNIT/ML
500 SOLUTION INTRAVENOUS PRN
Status: CANCELLED | OUTPATIENT
Start: 2020-12-23

## 2020-12-21 RX ORDER — 0.9 % SODIUM CHLORIDE 0.9 %
1000 INTRAVENOUS SOLUTION INTRAVENOUS ONCE
Status: COMPLETED | OUTPATIENT
Start: 2020-12-21 | End: 2020-12-23

## 2020-12-21 RX ORDER — DEXTROSE MONOHYDRATE 50 MG/ML
INJECTION, SOLUTION INTRAVENOUS ONCE
Status: CANCELLED | OUTPATIENT
Start: 2020-12-30 | End: 2020-12-30

## 2020-12-21 RX ORDER — SODIUM CHLORIDE 9 MG/ML
INJECTION, SOLUTION INTRAVENOUS ONCE
Status: CANCELLED | OUTPATIENT
Start: 2020-12-30 | End: 2020-12-30

## 2020-12-21 RX ORDER — DIPHENHYDRAMINE HYDROCHLORIDE 50 MG/ML
50 INJECTION INTRAMUSCULAR; INTRAVENOUS ONCE
Status: CANCELLED | OUTPATIENT
Start: 2020-12-30 | End: 2020-12-30

## 2020-12-21 RX ORDER — SODIUM CHLORIDE 0.9 % (FLUSH) 0.9 %
10 SYRINGE (ML) INJECTION PRN
Status: CANCELLED | OUTPATIENT
Start: 2020-12-29

## 2020-12-21 RX ORDER — 0.9 % SODIUM CHLORIDE 0.9 %
1000 INTRAVENOUS SOLUTION INTRAVENOUS ONCE
Status: CANCELLED | OUTPATIENT
Start: 2020-12-29

## 2020-12-21 RX ORDER — METHYLPREDNISOLONE SODIUM SUCCINATE 125 MG/2ML
125 INJECTION, POWDER, LYOPHILIZED, FOR SOLUTION INTRAMUSCULAR; INTRAVENOUS ONCE
Status: CANCELLED | OUTPATIENT
Start: 2020-12-21 | End: 2020-12-21

## 2020-12-21 RX ORDER — EPINEPHRINE 1 MG/ML
0.3 INJECTION, SOLUTION, CONCENTRATE INTRAVENOUS PRN
Status: CANCELLED | OUTPATIENT
Start: 2020-12-21

## 2020-12-21 RX ORDER — SODIUM CHLORIDE 9 MG/ML
INJECTION, SOLUTION INTRAVENOUS CONTINUOUS
Status: CANCELLED | OUTPATIENT
Start: 2020-12-23

## 2020-12-21 RX ORDER — DIPHENHYDRAMINE HYDROCHLORIDE 50 MG/ML
50 INJECTION INTRAMUSCULAR; INTRAVENOUS ONCE
Status: CANCELLED | OUTPATIENT
Start: 2020-12-21 | End: 2020-12-21

## 2020-12-21 RX ORDER — 0.9 % SODIUM CHLORIDE 0.9 %
1000 INTRAVENOUS SOLUTION INTRAVENOUS ONCE
Status: CANCELLED | OUTPATIENT
Start: 2020-12-21

## 2020-12-21 RX ORDER — DIPHENHYDRAMINE HYDROCHLORIDE 50 MG/ML
50 INJECTION INTRAMUSCULAR; INTRAVENOUS ONCE
Status: CANCELLED | OUTPATIENT
Start: 2020-12-23 | End: 2020-12-23

## 2020-12-21 RX ORDER — METHYLPREDNISOLONE SODIUM SUCCINATE 125 MG/2ML
125 INJECTION, POWDER, LYOPHILIZED, FOR SOLUTION INTRAMUSCULAR; INTRAVENOUS ONCE
Status: CANCELLED | OUTPATIENT
Start: 2020-12-28 | End: 2020-12-28

## 2020-12-21 RX ORDER — SODIUM CHLORIDE 0.9 % (FLUSH) 0.9 %
10 SYRINGE (ML) INJECTION PRN
Status: CANCELLED | OUTPATIENT
Start: 2020-12-23

## 2020-12-21 RX ORDER — SODIUM CHLORIDE 0.9 % (FLUSH) 0.9 %
5 SYRINGE (ML) INJECTION PRN
Status: CANCELLED | OUTPATIENT
Start: 2020-12-29

## 2020-12-21 RX ORDER — SODIUM CHLORIDE 9 MG/ML
INJECTION, SOLUTION INTRAVENOUS CONTINUOUS
Status: CANCELLED | OUTPATIENT
Start: 2020-12-30

## 2020-12-21 RX ORDER — SODIUM CHLORIDE 0.9 % (FLUSH) 0.9 %
10 SYRINGE (ML) INJECTION PRN
Status: CANCELLED | OUTPATIENT
Start: 2020-12-30

## 2020-12-21 RX ORDER — SODIUM CHLORIDE 0.9 % (FLUSH) 0.9 %
5 SYRINGE (ML) INJECTION PRN
Status: CANCELLED | OUTPATIENT
Start: 2020-12-28

## 2020-12-21 RX ORDER — SODIUM CHLORIDE 9 MG/ML
INJECTION, SOLUTION INTRAVENOUS CONTINUOUS
Status: CANCELLED | OUTPATIENT
Start: 2020-12-29

## 2020-12-21 RX ORDER — SODIUM CHLORIDE 9 MG/ML
INJECTION, SOLUTION INTRAVENOUS ONCE
Status: CANCELLED | OUTPATIENT
Start: 2020-12-28 | End: 2020-12-28

## 2020-12-21 RX ORDER — SODIUM CHLORIDE 0.9 % (FLUSH) 0.9 %
10 SYRINGE (ML) INJECTION PRN
Status: CANCELLED | OUTPATIENT
Start: 2020-12-28

## 2020-12-21 RX ORDER — SODIUM CHLORIDE 9 MG/ML
INJECTION, SOLUTION INTRAVENOUS CONTINUOUS
Status: CANCELLED | OUTPATIENT
Start: 2020-12-28

## 2020-12-21 RX ORDER — DEXTROSE MONOHYDRATE 50 MG/ML
INJECTION, SOLUTION INTRAVENOUS ONCE
Status: CANCELLED | OUTPATIENT
Start: 2020-12-23 | End: 2020-12-23

## 2020-12-21 RX ORDER — DIPHENHYDRAMINE HYDROCHLORIDE 50 MG/ML
50 INJECTION INTRAMUSCULAR; INTRAVENOUS ONCE
Status: CANCELLED | OUTPATIENT
Start: 2020-12-29 | End: 2020-12-29

## 2020-12-21 RX ORDER — SODIUM CHLORIDE 0.9 % (FLUSH) 0.9 %
10 SYRINGE (ML) INJECTION PRN
Status: CANCELLED | OUTPATIENT
Start: 2020-12-22

## 2020-12-21 RX ORDER — SODIUM CHLORIDE 0.9 % (FLUSH) 0.9 %
5 SYRINGE (ML) INJECTION PRN
Status: CANCELLED | OUTPATIENT
Start: 2020-12-22

## 2020-12-21 RX ORDER — SODIUM CHLORIDE 0.9 % (FLUSH) 0.9 %
5 SYRINGE (ML) INJECTION PRN
Status: CANCELLED | OUTPATIENT
Start: 2020-12-30

## 2020-12-21 RX ORDER — DIPHENHYDRAMINE HYDROCHLORIDE 50 MG/ML
50 INJECTION INTRAMUSCULAR; INTRAVENOUS ONCE
Status: CANCELLED | OUTPATIENT
Start: 2020-12-28 | End: 2020-12-28

## 2020-12-21 RX ORDER — 0.9 % SODIUM CHLORIDE 0.9 %
1000 INTRAVENOUS SOLUTION INTRAVENOUS ONCE
Status: CANCELLED | OUTPATIENT
Start: 2020-12-23

## 2020-12-21 RX ORDER — HEPARIN SODIUM (PORCINE) LOCK FLUSH IV SOLN 100 UNIT/ML 100 UNIT/ML
500 SOLUTION INTRAVENOUS PRN
Status: CANCELLED | OUTPATIENT
Start: 2020-12-30

## 2020-12-21 RX ORDER — SODIUM CHLORIDE 0.9 % (FLUSH) 0.9 %
10 SYRINGE (ML) INJECTION PRN
Status: CANCELLED | OUTPATIENT
Start: 2020-12-21

## 2020-12-21 RX ORDER — METHYLPREDNISOLONE SODIUM SUCCINATE 125 MG/2ML
125 INJECTION, POWDER, LYOPHILIZED, FOR SOLUTION INTRAMUSCULAR; INTRAVENOUS ONCE
Status: CANCELLED | OUTPATIENT
Start: 2020-12-29 | End: 2020-12-29

## 2020-12-21 RX ORDER — SODIUM CHLORIDE 9 MG/ML
INJECTION, SOLUTION INTRAVENOUS CONTINUOUS
Status: CANCELLED | OUTPATIENT
Start: 2020-12-21

## 2020-12-21 RX ORDER — SODIUM CHLORIDE 0.9 % (FLUSH) 0.9 %
5 SYRINGE (ML) INJECTION PRN
Status: CANCELLED | OUTPATIENT
Start: 2020-12-23

## 2020-12-21 RX ORDER — DEXTROSE MONOHYDRATE 50 MG/ML
INJECTION, SOLUTION INTRAVENOUS ONCE
Status: CANCELLED | OUTPATIENT
Start: 2020-12-29 | End: 2020-12-29

## 2020-12-21 RX ORDER — METHYLPREDNISOLONE SODIUM SUCCINATE 125 MG/2ML
125 INJECTION, POWDER, LYOPHILIZED, FOR SOLUTION INTRAMUSCULAR; INTRAVENOUS ONCE
Status: CANCELLED | OUTPATIENT
Start: 2020-12-22 | End: 2020-12-22

## 2020-12-21 RX ORDER — SODIUM CHLORIDE 9 MG/ML
INJECTION, SOLUTION INTRAVENOUS ONCE
Status: CANCELLED | OUTPATIENT
Start: 2020-12-29 | End: 2020-12-29

## 2020-12-21 RX ORDER — SODIUM CHLORIDE 0.9 % (FLUSH) 0.9 %
10 SYRINGE (ML) INJECTION PRN
Status: DISCONTINUED | OUTPATIENT
Start: 2020-12-21 | End: 2020-12-22 | Stop reason: HOSPADM

## 2020-12-21 RX ORDER — SODIUM CHLORIDE 9 MG/ML
INJECTION, SOLUTION INTRAVENOUS ONCE
Status: CANCELLED | OUTPATIENT
Start: 2020-12-22 | End: 2020-12-22

## 2020-12-21 RX ORDER — SODIUM CHLORIDE 0.9 % (FLUSH) 0.9 %
5 SYRINGE (ML) INJECTION PRN
Status: CANCELLED | OUTPATIENT
Start: 2020-12-21

## 2020-12-21 RX ORDER — DEXTROSE MONOHYDRATE 50 MG/ML
INJECTION, SOLUTION INTRAVENOUS ONCE
Status: CANCELLED | OUTPATIENT
Start: 2020-12-28 | End: 2020-12-28

## 2020-12-21 RX ORDER — EPINEPHRINE 1 MG/ML
0.3 INJECTION, SOLUTION, CONCENTRATE INTRAVENOUS PRN
Status: CANCELLED | OUTPATIENT
Start: 2020-12-22

## 2020-12-21 RX ORDER — EPINEPHRINE 1 MG/ML
0.3 INJECTION, SOLUTION, CONCENTRATE INTRAVENOUS PRN
Status: CANCELLED | OUTPATIENT
Start: 2020-12-29

## 2020-12-21 RX ORDER — METHYLPREDNISOLONE SODIUM SUCCINATE 125 MG/2ML
125 INJECTION, POWDER, LYOPHILIZED, FOR SOLUTION INTRAMUSCULAR; INTRAVENOUS ONCE
Status: CANCELLED | OUTPATIENT
Start: 2020-12-23 | End: 2020-12-23

## 2020-12-21 RX ORDER — EPINEPHRINE 1 MG/ML
0.3 INJECTION, SOLUTION, CONCENTRATE INTRAVENOUS PRN
Status: CANCELLED | OUTPATIENT
Start: 2020-12-23

## 2020-12-21 RX ORDER — 0.9 % SODIUM CHLORIDE 0.9 %
1000 INTRAVENOUS SOLUTION INTRAVENOUS ONCE
Status: CANCELLED | OUTPATIENT
Start: 2020-12-22

## 2020-12-21 RX ORDER — SODIUM CHLORIDE 9 MG/ML
INJECTION, SOLUTION INTRAVENOUS ONCE
Status: CANCELLED | OUTPATIENT
Start: 2020-12-23 | End: 2020-12-23

## 2020-12-21 RX ORDER — HEPARIN SODIUM (PORCINE) LOCK FLUSH IV SOLN 100 UNIT/ML 100 UNIT/ML
500 SOLUTION INTRAVENOUS PRN
Status: CANCELLED | OUTPATIENT
Start: 2020-12-21

## 2020-12-21 RX ORDER — SODIUM CHLORIDE 9 MG/ML
INJECTION, SOLUTION INTRAVENOUS ONCE
Status: CANCELLED | OUTPATIENT
Start: 2020-12-21 | End: 2020-12-21

## 2020-12-21 RX ADMIN — Medication 10 ML: at 10:18

## 2020-12-21 RX ADMIN — Medication 10 ML: at 13:16

## 2020-12-21 RX ADMIN — Medication 500 UNITS: at 13:16

## 2020-12-21 RX ADMIN — SODIUM CHLORIDE 1000 ML: 9 INJECTION, SOLUTION INTRAVENOUS at 10:18

## 2020-12-21 RX ADMIN — DEXAMETHASONE SODIUM PHOSPHATE 20 MG: 4 INJECTION, SOLUTION INTRAMUSCULAR; INTRAVENOUS at 10:29

## 2020-12-21 RX ADMIN — Medication 10 ML: at 09:15

## 2020-12-21 NOTE — PROGRESS NOTES
[facility]  CANCER NETWORK OF [facility]  [facility] [address] [facility]  [address]  [address]  Dept: [phone]  Dept Fax: [phone]  Loc: [phone]     Subjective:      Chief Complaint:  Nabil Alvarez is a 56 y.o. with lymphoma and adenocarcinoma of the lung. HPI:  Nabil is here today for follow-up regarding his history of non-small cell adenocarcinoma of the lung. He also has a history of non-Hodgkin's lymphoma. He has been receiving adjuvant chemotherapy treatment with cisplatin and Alimta. This is been complicated with rather severe nausea. His nausea has been difficult to control despite the use of strong antiemetic therapy. The patient reports that his most beneficial medication that has helped his nausea has been the use of Decadron. He also has had periodic hydration with normal saline as he has had poor oral intake during his bouts of nausea. The patient has completed 2 cycles of therapy of a planned 4 cycles of treatment. He is somewhat reluctant to proceed with therapy. Especially because of the upcoming holiday season he does not want to receive treatment today. Therefore the chemotherapy will be delayed by at least 2 weeks and will make further treatment decisions about completing the final 2 cycles of therapy at that time. However, the patient will continue to receive hydration as needed with intravenous Decadron as needed to help control his nausea and maintain hydration. The patient has not had fever, cough, shortness of breath or other signs of infection. Both his bowel and bladder habits have been normal.  Despite his nausea his ECOG performance status is level 0-1. The patient does not have any symptoms to suggest recurrence of lymphoma or his lung cancer.     PMH, SH, and FH: I reviewed the patients medication list and allergy list as noted on the electronic medical record. The PMH, SH and FH were also reviewed as noted on the EMR. Review of Systems  Constitutional: Negative. HENT: Negative. Eyes: Negative. Respiratory: Negative. Cardiovascular: Negative. Gastrointestinal: Chronic nausea. Genitourinary: Negative. Musculoskeletal: Negative. Skin: Negative. Neurological: Negative. Hematological: Negative. Psychiatric/Behavioral: Negative. Objective:   Physical Exam  Vitals:    12/21/20 0914   BP: 134/77   Pulse: 99   Resp: 16   Temp: 98.2 °F (36.8 °C)   SpO2: 95%   Vitals reviewed and are stable. Constitutional: Well-developed. No acute distress. HENT: Normocephalic and atraumatic. Eyes: Pupils appear equal. No scleral icterus. Neck: Overall appearance is symmetrical. No identifiable masses. Chest: Mediport in place in the upper chest. Appears stable. Pulmonary: Effort normal. No respiratory distress. Cardiovascular: No edema in any of the four extremities. Abdominal: Soft. No hepatomegaly or splenomegaly. Musculoskeletal: Gait is normal. Muscle strength and tone grossly good. Neurological: Alert and oriented to person, place, and time. Judgment and thought content normal.  Skin: Skin is warm and dry. No rash. Psychiatric: Mood and affect appropriate for the clinical situation. Data Analysis:    Hematology 12/21/2020 11/30/2020 11/23/2020   WBC 7.4 7.6 4.5 (L)   RBC 3.60 (L) 4.32 (L) 3.93 (L)   HGB 11.1 (L) 13.1 (L) 11.9 (L)   HCT 32.7 (L) 38.7 (L) 35.8 (L)   MCV 91 90 91.1   RDW 16.7 (H) 16.4 (H)     443 (H) 241     Assessment:   1. Follicular lymphoma stage III. 2.  Adenocarcinoma of the lung. 3.  Chronic anemia. 4.  Nausea/vomiting. Plan:   1. HOLD chemotherapy with cisplatin and Alimta. 2.  Intravenous hydration with antiemetic therapy today. 3. Monitor hemoglobin/hematocrit and for any signs of blood loss. 4.  Monitor for recurrence of either lymphoma or lung cancer. Lucrecia Lieberman M.D. Medical Director: [facility]  Cancer [place] [address] [address] [facility], [address] [place] [address], [address] [place] [address], [address] of the [facility] at the [facility]      **This report has been created using voice recognition software. It may contain minor errors which are inherent in voice recognition technology. **

## 2020-12-21 NOTE — PROGRESS NOTES
Patient assessed for the following post hydration and decadron:    Dizziness   No  Lightheadedness  No      Acute nausea/vomiting No  Headache   No  Chest pain/pressure  No  Rash/itching   No  Shortness of breath  No    Patient tolerated fluid and hydration  without any complications. Last vital signs:   BP (!) 142/82   Pulse 89   Temp 98.1 °F (36.7 °C) (Oral)   Resp 18   SpO2 98%     Patient instructed if experience any of the above symptoms following today's infusion,he is to notify MD immediately or go to the emergency department. Discharge instructions given to patient. Verbalizes understanding. Ambulated off unit per self  with belongings. patient drank a half of a bottle of water while here , didn't eat anything and was up to bathroom at the end of infusion

## 2020-12-21 NOTE — PLAN OF CARE
Problem: Musculor/Skeletal Functional Status  Goal: Absence of falls  Outcome: Met This Shift  Note: No falls this admission   Intervention: Fall precautions  Note: Patient aware of fall precautions for here and at home -call light in reach while here       Problem: Intellectual/Education/Knowledge Deficit  Goal: Teaching initiated upon admission  Outcome: Met This Shift  Note: Reviewed orders for hydration and decadron  with patient, patient offered no questions or concerns. Patient verbalized understanding of drug being administered. Goal: Written Disposition Instruction form completed  Outcome: Met This Shift  Note: Discharge instructions given and reviewed with patient. All questions answered. Patient verbalized understanding   Intervention: Verbal/written education provided  Note: Discharge instruction sheets      Problem: Discharge Planning  Goal: Knowledge of discharge instructions  Description: Knowledge of discharge instructions  Outcome: Met This Shift  Note: Patient  able to teach back follow up appointments and when to call the doctor. Patient offers no questions at this time   Intervention: Interaction with patient/family and care team  Note: All questions and concerns addressed   Intervention: Discharge to appropriate level of care  Note: Discharge home     Problem: Infection - Central Venous Catheter-Associated Bloodstream Infection:  Goal: Will show no infection signs and symptoms  Description: Will show no infection signs and symptoms  Outcome: Met This Shift  Note: No signs of infection noted at [facility] site    Intervention: Central line needs assessment  Note:  Patient currently under going chemotherapy with poor venous access   Intervention: Infection risk assessment  Note: Patient aware that is of increased risk for infection due to receiving chemotherapy and having a central venous catheter.  Patient aware of signs and symptoms of infection and when to call the doctor    Care plan reviewed with patient and he verbalized understanding of the plan of care and contributed to goal setting.

## 2020-12-21 NOTE — PATIENT INSTRUCTIONS
1.  No chemotherapy treatment today. 2.  Hydration therapy today and also on 12/22/2020, 12/23/2020, 12/28/2020, 12/29/2020, and 12/30/2020.  3.  Labs on RTC,  4.  Possible chemotherapy on RTC.

## 2020-12-22 ENCOUNTER — HOSPITAL ENCOUNTER (OUTPATIENT)
Dept: INFUSION THERAPY | Age: 60
Discharge: HOME | End: 2020-12-22
Payer: COMMERCIAL

## 2020-12-22 VITALS
HEART RATE: 83 BPM | SYSTOLIC BLOOD PRESSURE: 123 MMHG | OXYGEN SATURATION: 98 % | RESPIRATION RATE: 18 BRPM | DIASTOLIC BLOOD PRESSURE: 72 MMHG | TEMPERATURE: 97.8 F

## 2020-12-22 DIAGNOSIS — C82.58 DIFFUSE FOLLICLE CENTER LYMPHOMA OF LYMPH NODES OF MULTIPLE REGIONS (HCC): Primary | ICD-10-CM

## 2020-12-22 PROCEDURE — 96361 HYDRATE IV INFUSION ADD-ON: CPT

## 2020-12-22 PROCEDURE — 96365 THER/PROPH/DIAG IV INF INIT: CPT

## 2020-12-22 PROCEDURE — 6360000002 HC RX W HCPCS: Performed by: INTERNAL MEDICINE

## 2020-12-22 PROCEDURE — 2580000003 HC RX 258: Performed by: INTERNAL MEDICINE

## 2020-12-22 RX ORDER — HEPARIN SODIUM (PORCINE) LOCK FLUSH IV SOLN 100 UNIT/ML 100 UNIT/ML
500 SOLUTION INTRAVENOUS PRN
Status: DISCONTINUED | OUTPATIENT
Start: 2020-12-22 | End: 2020-12-23 | Stop reason: HOSPADM

## 2020-12-22 RX ORDER — SODIUM CHLORIDE 0.9 % (FLUSH) 0.9 %
10 SYRINGE (ML) INJECTION PRN
Status: DISCONTINUED | OUTPATIENT
Start: 2020-12-22 | End: 2020-12-23 | Stop reason: HOSPADM

## 2020-12-22 RX ORDER — 0.9 % SODIUM CHLORIDE 0.9 %
1000 INTRAVENOUS SOLUTION INTRAVENOUS ONCE
Status: COMPLETED | OUTPATIENT
Start: 2020-12-22 | End: 2020-12-22

## 2020-12-22 RX ADMIN — Medication 10 ML: at 17:00

## 2020-12-22 RX ADMIN — Medication 10 ML: at 14:58

## 2020-12-22 RX ADMIN — DEXAMETHASONE SODIUM PHOSPHATE 20 MG: 4 INJECTION, SOLUTION INTRAMUSCULAR; INTRAVENOUS at 14:58

## 2020-12-22 RX ADMIN — SODIUM CHLORIDE 1000 ML: 9 INJECTION, SOLUTION INTRAVENOUS at 14:57

## 2020-12-22 RX ADMIN — Medication 500 UNITS: at 17:00

## 2020-12-22 NOTE — PLAN OF CARE
Care plan reviewed with patient. Patient  verbalized understanding of the plan of care and contribute to goal setting. Problem: Intellectual/Education/Knowledge Deficit  Goal: Teaching initiated upon admission  Outcome: Met This Shift  Note: Patient verbalizes understanding to verbal information given on decadron and hydration,action and possible side effects. Aware to call MD if develop complications. Intervention: Verbal/written education provided  Note: Discuss hydration and fluids     Problem: Discharge Planning  Goal: Knowledge of discharge instructions  Description: Knowledge of discharge instructions  Outcome: Met This Shift  Note: Verbalized understanding of discharge instructions, follow ups and when to call doctor   Intervention: Discharge to appropriate level of care  Note: Discuss discharge instructions, follow ups and when to call doctor. Problem: Falls - Risk of:  Goal: Will remain free from falls  Description: Will remain free from falls  Outcome: Met This Shift  Note: Free from falls while in infusion center. Verbalized understanding of fall prevention and to ask for assistance with ambulation   Intervention: Assess risk factors for falls  Description: Assess risk factors for falls  Note: Assess for fall risk, instruct to ask for assistance with ambulation      Problem: Infection - Central Venous Catheter-Associated Bloodstream Infection:  Goal: Will show no infection signs and symptoms  Description: Will show no infection signs and symptoms  Outcome: Met This Shift  Note: Mediport site with no redness or warmth. Skin over port intact with no signs of breakdown noted. Patient verbalizes signs/symptoms of port infection and when to notify the physician.     Intervention: Infection risk assessment  Description: Infection risk assessment  Note: Discuss port maintenance, infection prevention, signs and when to call

## 2020-12-22 NOTE — PROGRESS NOTES
Hydration complete. tolerated well. Discharged in satisfactory condition.  Ambulated off unit per self

## 2020-12-23 ENCOUNTER — HOSPITAL ENCOUNTER (OUTPATIENT)
Dept: INFUSION THERAPY | Age: 60
Discharge: HOME | End: 2020-12-23
Payer: COMMERCIAL

## 2020-12-23 VITALS
TEMPERATURE: 98.1 F | SYSTOLIC BLOOD PRESSURE: 140 MMHG | DIASTOLIC BLOOD PRESSURE: 80 MMHG | OXYGEN SATURATION: 97 % | HEART RATE: 74 BPM | RESPIRATION RATE: 18 BRPM

## 2020-12-23 DIAGNOSIS — C82.58 DIFFUSE FOLLICLE CENTER LYMPHOMA OF LYMPH NODES OF MULTIPLE REGIONS (HCC): Primary | ICD-10-CM

## 2020-12-23 PROCEDURE — 6360000002 HC RX W HCPCS: Performed by: INTERNAL MEDICINE

## 2020-12-23 PROCEDURE — 2580000003 HC RX 258: Performed by: INTERNAL MEDICINE

## 2020-12-23 PROCEDURE — 96361 HYDRATE IV INFUSION ADD-ON: CPT

## 2020-12-23 PROCEDURE — 96365 THER/PROPH/DIAG IV INF INIT: CPT

## 2020-12-23 PROCEDURE — 99211 OFF/OP EST MAY X REQ PHY/QHP: CPT

## 2020-12-23 RX ORDER — HEPARIN SODIUM (PORCINE) LOCK FLUSH IV SOLN 100 UNIT/ML 100 UNIT/ML
500 SOLUTION INTRAVENOUS PRN
Status: DISCONTINUED | OUTPATIENT
Start: 2020-12-23 | End: 2020-12-24 | Stop reason: HOSPADM

## 2020-12-23 RX ORDER — 0.9 % SODIUM CHLORIDE 0.9 %
1000 INTRAVENOUS SOLUTION INTRAVENOUS ONCE
Status: COMPLETED | OUTPATIENT
Start: 2020-12-23 | End: 2020-12-23

## 2020-12-23 RX ORDER — SODIUM CHLORIDE 0.9 % (FLUSH) 0.9 %
10 SYRINGE (ML) INJECTION PRN
Status: DISCONTINUED | OUTPATIENT
Start: 2020-12-23 | End: 2020-12-24 | Stop reason: HOSPADM

## 2020-12-23 RX ADMIN — Medication 10 ML: at 17:06

## 2020-12-23 RX ADMIN — DEXAMETHASONE SODIUM PHOSPHATE 20 MG: 4 INJECTION, SOLUTION INTRAMUSCULAR; INTRAVENOUS at 14:43

## 2020-12-23 RX ADMIN — SODIUM CHLORIDE 1000 ML: 9 INJECTION, SOLUTION INTRAVENOUS at 15:13

## 2020-12-23 RX ADMIN — Medication 10 ML: at 14:43

## 2020-12-23 RX ADMIN — Medication 500 UNITS: at 17:06

## 2020-12-23 NOTE — PLAN OF CARE
Problem: Musculor/Skeletal Functional Status  Goal: Absence of falls  Description: No falls this admission     Outcome: Met This Shift  Note: No falls this admission   Intervention: Fall precautions  Note: Patient aware of fall precautions for here and at home -call light in reach while here       Problem: Intellectual/Education/Knowledge Deficit  Goal: Teaching initiated upon admission  Description: Reviewed orders   Outcome: Met This Shift  Note: Reviewed orders for hydration  Goal: Written Disposition Instruction form completed  Description: Discharge instructions given and reviewed with patient. All questions answered. Patient verbalized understanding   Outcome: Met This Shift  Note: Discharge instructions given and reviewed with patient. All questions answered. Patient verbalized understanding   Intervention: Verbal/written education provided  Note: Discharge instruction sheets     Problem: Discharge Planning  Goal: Knowledge of discharge instructions  Description: Knowledge of discharge instructions  Outcome: Met This Shift  Note: Patient  able to teach back follow up appointments and when to call the doctor. Patient offers no questions at this time   Intervention: Interaction with patient/family and care team  Note: All concerns addressed   Intervention: Discharge to appropriate level of care  Note: Discharge home     Problem: Infection - Central Venous Catheter-Associated Bloodstream Infection:  Goal: Will show no infection signs and symptoms  Description: Will show no infection signs and symptoms  Outcome: Met This Shift  Note: No signs of infection noted at [facility] site    Intervention: Central line needs assessment  Note:  Patient currently under going chemotherapy with poor venous access   Intervention: Infection risk assessment  Note: Patient aware that is of increased risk for infection due to receiving chemotherapy and having a central venous catheter.  Patient aware of signs and symptoms of infection and when to call the doctor    Care plan reviewed with patient and he verbalized understanding of the plan of care and contributed to goal setting.

## 2020-12-23 NOTE — PROGRESS NOTES
Patient instructed to go to the er if ankle continues hurt, has swelling, or gets red or swollen. patient instructed to apply for short interval as needed for pain control.  Patient verbalized understanding of above and is agreeable to plan of care

## 2020-12-23 NOTE — PROGRESS NOTES
Patient assessed for the following post hydration :    Dizziness   No  Lightheadedness  No      Acute nausea/vomiting No  Headache   No  Chest pain/pressure  No  Rash/itching   No  Shortness of breath  No      Patient tolerated hydration  without any complications. Last vital signs:   BP (!) 140/80   Pulse 74   Temp 98.1 °F (36.7 °C) (Oral)   Resp 18   SpO2 97%       Patient instructed if experience any of the above symptoms following today's infusion,he is to notify MD immediately or go to the emergency department. Discharge instructions given to patient. Verbalizes understanding. Ambulated off unit per self  with belongings.  Family member will be driving home

## 2020-12-28 ENCOUNTER — HOSPITAL ENCOUNTER (OUTPATIENT)
Dept: INFUSION THERAPY | Age: 60
Discharge: HOME | End: 2020-12-28
Payer: COMMERCIAL

## 2020-12-28 VITALS
OXYGEN SATURATION: 96 % | TEMPERATURE: 98.1 F | RESPIRATION RATE: 20 BRPM | SYSTOLIC BLOOD PRESSURE: 118 MMHG | DIASTOLIC BLOOD PRESSURE: 76 MMHG | HEART RATE: 95 BPM

## 2020-12-28 DIAGNOSIS — C82.58 DIFFUSE FOLLICLE CENTER LYMPHOMA OF LYMPH NODES OF MULTIPLE REGIONS (HCC): Primary | ICD-10-CM

## 2020-12-28 PROCEDURE — 96365 THER/PROPH/DIAG IV INF INIT: CPT

## 2020-12-28 PROCEDURE — 2580000003 HC RX 258: Performed by: INTERNAL MEDICINE

## 2020-12-28 PROCEDURE — 6360000002 HC RX W HCPCS: Performed by: INTERNAL MEDICINE

## 2020-12-28 PROCEDURE — 96361 HYDRATE IV INFUSION ADD-ON: CPT

## 2020-12-28 RX ORDER — HEPARIN SODIUM (PORCINE) LOCK FLUSH IV SOLN 100 UNIT/ML 100 UNIT/ML
500 SOLUTION INTRAVENOUS PRN
Status: DISCONTINUED | OUTPATIENT
Start: 2020-12-28 | End: 2020-12-29 | Stop reason: HOSPADM

## 2020-12-28 RX ORDER — 0.9 % SODIUM CHLORIDE 0.9 %
1000 INTRAVENOUS SOLUTION INTRAVENOUS ONCE
Status: COMPLETED | OUTPATIENT
Start: 2020-12-28 | End: 2020-12-28

## 2020-12-28 RX ORDER — SODIUM CHLORIDE 0.9 % (FLUSH) 0.9 %
10 SYRINGE (ML) INJECTION PRN
Status: DISCONTINUED | OUTPATIENT
Start: 2020-12-28 | End: 2020-12-29 | Stop reason: HOSPADM

## 2020-12-28 RX ORDER — CALCIUM CARBONATE 300MG(750)
400 TABLET,CHEWABLE ORAL DAILY
Qty: 15 TABLET | Refills: 0 | Status: SHIPPED | OUTPATIENT
Start: 2020-12-28 | End: 2021-01-07

## 2020-12-28 RX ADMIN — Medication 10 ML: at 16:37

## 2020-12-28 RX ADMIN — SODIUM CHLORIDE 1000 ML: 9 INJECTION, SOLUTION INTRAVENOUS at 14:15

## 2020-12-28 RX ADMIN — Medication 500 UNITS: at 16:38

## 2020-12-28 RX ADMIN — Medication 10 ML: at 14:15

## 2020-12-28 RX ADMIN — DEXAMETHASONE SODIUM PHOSPHATE 20 MG: 4 INJECTION, SOLUTION INTRAMUSCULAR; INTRAVENOUS at 14:18

## 2020-12-28 NOTE — PLAN OF CARE
Problem: Infection - Central Venous Catheter-Associated Bloodstream Infection:  Goal: Will show no infection signs and symptoms  Description: Will show no infection signs and symptoms  Outcome: Met This Shift  Note: Mediport site with no redness or warmth. Skin over port intact with no signs of breakdown noted. Patient verbalizes signs/symptoms of port infection and when to notify the physician. Intervention: Infection risk assessment  Note: Discussed port maintenance, infection prevention, signs and when to call the doctor     Problem: Musculor/Skeletal Functional Status  Goal: Absence of falls  Outcome: Met This Shift  Note: Patient verbalizes understanding of fall precautions. Patient free from falls this visit. Intervention: Fall precautions  Note: Discussed fall precautions, call light within reach. Problem: Intellectual/Education/Knowledge Deficit  Goal: Teaching initiated upon admission  Outcome: Met This Shift  Note: Patient verbalizes understanding to verbal information given on IV fluids and decadron,action and possible side effects. Aware to call MD if develop complications. Intervention: Verbal/written education provided  Note: Discussed IV fluids, decadron action, possible side effects and when to call the doctor. Problem: Discharge Planning  Goal: Knowledge of discharge instructions  Description: Knowledge of discharge instructions  Outcome: Met This Shift  Note: Verbalized understanding of discharge instructions, follow-up appointments, and when to call the physician. Intervention: Discharge to appropriate level of care  Note: Discuss discharge instructions, follow ups, and when to call the doctor. Care plan reviewed with patient. Patient verbalizes understanding of the plan of care and contribute to goal setting.

## 2020-12-28 NOTE — PROGRESS NOTES
Patient assessed for the following post IV fluids:    Dizziness   No  Lightheadedness  No      Acute nausea/vomiting No  Headache   No  Chest pain/pressure  No  Rash/itching   No  Shortness of breath  No      Patient tolerated IV fluids and decadron without any complications. Last vital signs:   /76   Pulse 95   Temp 98.1 °F (36.7 °C) (Oral)   Resp 20   SpO2 96%     Patient instructed if experience any of the above symptoms following today's infusion,he is to notify MD immediately or go to the emergency department. Discharge instructions given to patient. Verbalizes understanding. Ambulated off unit per self with belongings.

## 2020-12-29 ENCOUNTER — HOSPITAL ENCOUNTER (OUTPATIENT)
Dept: INFUSION THERAPY | Age: 60
Discharge: HOME | End: 2020-12-29
Payer: COMMERCIAL

## 2020-12-29 VITALS
DIASTOLIC BLOOD PRESSURE: 61 MMHG | HEART RATE: 75 BPM | TEMPERATURE: 97.7 F | SYSTOLIC BLOOD PRESSURE: 118 MMHG | RESPIRATION RATE: 18 BRPM | OXYGEN SATURATION: 97 %

## 2020-12-29 DIAGNOSIS — C82.58 DIFFUSE FOLLICLE CENTER LYMPHOMA OF LYMPH NODES OF MULTIPLE REGIONS (HCC): Primary | ICD-10-CM

## 2020-12-29 PROCEDURE — 2580000003 HC RX 258: Performed by: INTERNAL MEDICINE

## 2020-12-29 PROCEDURE — 6360000002 HC RX W HCPCS: Performed by: INTERNAL MEDICINE

## 2020-12-29 PROCEDURE — 96361 HYDRATE IV INFUSION ADD-ON: CPT

## 2020-12-29 PROCEDURE — 96365 THER/PROPH/DIAG IV INF INIT: CPT

## 2020-12-29 RX ORDER — 0.9 % SODIUM CHLORIDE 0.9 %
1000 INTRAVENOUS SOLUTION INTRAVENOUS ONCE
Status: COMPLETED | OUTPATIENT
Start: 2020-12-29 | End: 2020-12-29

## 2020-12-29 RX ORDER — SODIUM CHLORIDE 0.9 % (FLUSH) 0.9 %
10 SYRINGE (ML) INJECTION PRN
Status: DISCONTINUED | OUTPATIENT
Start: 2020-12-29 | End: 2020-12-30 | Stop reason: HOSPADM

## 2020-12-29 RX ORDER — HEPARIN SODIUM (PORCINE) LOCK FLUSH IV SOLN 100 UNIT/ML 100 UNIT/ML
500 SOLUTION INTRAVENOUS PRN
Status: DISCONTINUED | OUTPATIENT
Start: 2020-12-29 | End: 2020-12-30 | Stop reason: HOSPADM

## 2020-12-29 RX ADMIN — Medication 500 UNITS: at 16:46

## 2020-12-29 RX ADMIN — DEXAMETHASONE SODIUM PHOSPHATE 20 MG: 4 INJECTION, SOLUTION INTRAMUSCULAR; INTRAVENOUS at 14:32

## 2020-12-29 RX ADMIN — Medication 10 ML: at 14:25

## 2020-12-29 RX ADMIN — SODIUM CHLORIDE 1000 ML: 9 INJECTION, SOLUTION INTRAVENOUS at 14:25

## 2020-12-29 RX ADMIN — Medication 10 ML: at 16:46

## 2020-12-29 NOTE — PROGRESS NOTES
Patient assessed for the following post IV fluids:    Dizziness   No  Lightheadedness  No      Acute nausea/vomiting No  Headache   No  Chest pain/pressure  No  Rash/itching   No  Shortness of breath  No      Patient tolerated IV fluids and decadron without any complications. Last vital signs:   /61   Pulse 75   Temp 97.7 °F (36.5 °C) (Oral)   Resp 18   SpO2 97%       Patient instructed if experience any of the above symptoms following today's infusion,he is to notify MD immediately or go to the emergency department. Discharge instructions given to patient. Verbalizes understanding. Ambulated off unit per self with belongings.

## 2020-12-29 NOTE — PLAN OF CARE
Problem: Infection - Central Venous Catheter-Associated Bloodstream Infection:  Goal: Will show no infection signs and symptoms  Description: Will show no infection signs and symptoms  Outcome: Met This Shift  Note: Mediport site with no redness or warmth. Skin over port intact with no signs of breakdown noted. Patient verbalizes signs/symptoms of port infection and when to notify the physician. Intervention: Infection risk assessment  Note: Discussed port maintenance, infection prevention, signs and when to call the doctor     Problem: Musculor/Skeletal Functional Status  Goal: Absence of falls  Outcome: Met This Shift  Note: Patient verbalizes understanding of fall precautions. Patient free from falls this visit. Intervention: Fall precautions  Note: Discussed fall precautions, call light within reach. Problem: Intellectual/Education/Knowledge Deficit  Goal: Teaching initiated upon admission  Outcome: Met This Shift  Note: Patient verbalizes understanding to verbal information given on IV fluids and decadron,action and possible side effects. Aware to call MD if develop complications. Intervention: Verbal/written education provided  Note: Discussed IV fluids, decadron, and when to call the doctor. Problem: Discharge Planning  Goal: Knowledge of discharge instructions  Description: Knowledge of discharge instructions  Outcome: Met This Shift  Note: Verbalized understanding of discharge instructions, follow-up appointments, and when to call the physician. Intervention: Discharge to appropriate level of care  Note: Discuss discharge instructions, follow ups, and when to call the doctor. Care plan reviewed with patient. Patient verbalizes understanding of the plan of care and contribute to goal setting.

## 2020-12-30 ENCOUNTER — HOSPITAL ENCOUNTER (OUTPATIENT)
Dept: INFUSION THERAPY | Age: 60
Discharge: HOME | End: 2020-12-30
Payer: COMMERCIAL

## 2020-12-30 VITALS
SYSTOLIC BLOOD PRESSURE: 119 MMHG | HEART RATE: 78 BPM | TEMPERATURE: 98 F | OXYGEN SATURATION: 98 % | DIASTOLIC BLOOD PRESSURE: 58 MMHG | RESPIRATION RATE: 18 BRPM

## 2020-12-30 DIAGNOSIS — C82.58 DIFFUSE FOLLICLE CENTER LYMPHOMA OF LYMPH NODES OF MULTIPLE REGIONS (HCC): Primary | ICD-10-CM

## 2020-12-30 PROCEDURE — 6360000002 HC RX W HCPCS: Performed by: INTERNAL MEDICINE

## 2020-12-30 PROCEDURE — 96361 HYDRATE IV INFUSION ADD-ON: CPT

## 2020-12-30 PROCEDURE — 2580000003 HC RX 258: Performed by: INTERNAL MEDICINE

## 2020-12-30 PROCEDURE — 96374 THER/PROPH/DIAG INJ IV PUSH: CPT

## 2020-12-30 RX ORDER — HEPARIN SODIUM (PORCINE) LOCK FLUSH IV SOLN 100 UNIT/ML 100 UNIT/ML
500 SOLUTION INTRAVENOUS PRN
Status: DISCONTINUED | OUTPATIENT
Start: 2020-12-30 | End: 2020-12-31 | Stop reason: HOSPADM

## 2020-12-30 RX ORDER — SODIUM CHLORIDE 0.9 % (FLUSH) 0.9 %
10 SYRINGE (ML) INJECTION PRN
Status: DISCONTINUED | OUTPATIENT
Start: 2020-12-30 | End: 2020-12-31 | Stop reason: HOSPADM

## 2020-12-30 RX ORDER — 0.9 % SODIUM CHLORIDE 0.9 %
1000 INTRAVENOUS SOLUTION INTRAVENOUS ONCE
Status: COMPLETED | OUTPATIENT
Start: 2020-12-30 | End: 2020-12-30

## 2020-12-30 RX ADMIN — SODIUM CHLORIDE 1000 ML: 9 INJECTION, SOLUTION INTRAVENOUS at 15:10

## 2020-12-30 RX ADMIN — DEXAMETHASONE SODIUM PHOSPHATE 20 MG: 4 INJECTION, SOLUTION INTRAMUSCULAR; INTRAVENOUS at 14:49

## 2020-12-30 RX ADMIN — Medication 500 UNITS: at 16:52

## 2020-12-30 RX ADMIN — Medication 10 ML: at 16:52

## 2020-12-30 RX ADMIN — Medication 10 ML: at 14:47

## 2020-12-30 NOTE — PROGRESS NOTES
Hydration complete. Tolerated well. Discharged in satisfactory condition. Ambulated off unit per self.

## 2020-12-30 NOTE — PLAN OF CARE
Care plan reviewed with patient. Patient verbalized understanding of the plan of care and contribute to goal setting. Problem: Intellectual/Education/Knowledge Deficit  Goal: Teaching initiated upon admission  Outcome: Met This Shift  Note: Patient verbalizes understanding to verbal information given on decadron and hydration,action and possible side effects. Aware to call MD if develop complications. Intervention: Verbal/written education provided  Note: Discuss hydration     Problem: Discharge Planning  Goal: Knowledge of discharge instructions  Description: Knowledge of discharge instructions  Outcome: Met This Shift  Note: Verbalized understanding of discharge instructions, follow ups and when to call doctor   Intervention: Discharge to appropriate level of care  Note: Discuss discharge instructions, follow ups and when to call doctor. Problem: Falls - Risk of:  Goal: Will remain free from falls  Description: Will remain free from falls  Outcome: Met This Shift  Note: Free from falls while in infusion center. Verbalized understanding of fall prevention and to ask for assistance with ambulation   Intervention: Assess risk factors for falls  Description: Assess risk factors for falls  Note: Assess for fall risk, instruct to ask for assistance with ambulation      Problem: Infection - Central Venous Catheter-Associated Bloodstream Infection:  Goal: Will show no infection signs and symptoms  Description: Will show no infection signs and symptoms  Outcome: Met This Shift  Note: Mediport site with no redness or warmth. Skin over port intact with no signs of breakdown noted. Patient verbalizes signs/symptoms of port infection and when to notify the physician.     Intervention: Infection risk assessment  Description: Infection risk assessment  Note: Discuss port maintenance, infection prevention, signs and when to call

## 2021-01-04 ENCOUNTER — HOSPITAL ENCOUNTER (OUTPATIENT)
Dept: INFUSION THERAPY | Age: 61
Discharge: HOME | End: 2021-01-04
Payer: COMMERCIAL

## 2021-01-04 VITALS
OXYGEN SATURATION: 96 % | TEMPERATURE: 98.1 F | RESPIRATION RATE: 20 BRPM | HEART RATE: 97 BPM | SYSTOLIC BLOOD PRESSURE: 132 MMHG | DIASTOLIC BLOOD PRESSURE: 73 MMHG

## 2021-01-04 DIAGNOSIS — C82.58 DIFFUSE FOLLICLE CENTER LYMPHOMA OF LYMPH NODES OF MULTIPLE REGIONS (HCC): Primary | ICD-10-CM

## 2021-01-04 PROCEDURE — 99211 OFF/OP EST MAY X REQ PHY/QHP: CPT

## 2021-01-04 PROCEDURE — 2580000003 HC RX 258: Performed by: INTERNAL MEDICINE

## 2021-01-04 PROCEDURE — 96361 HYDRATE IV INFUSION ADD-ON: CPT

## 2021-01-04 PROCEDURE — 6360000002 HC RX W HCPCS: Performed by: INTERNAL MEDICINE

## 2021-01-04 PROCEDURE — 96365 THER/PROPH/DIAG IV INF INIT: CPT

## 2021-01-04 RX ORDER — SODIUM CHLORIDE 9 MG/ML
INJECTION, SOLUTION INTRAVENOUS ONCE
Status: CANCELLED | OUTPATIENT
Start: 2021-01-04 | End: 2021-01-04

## 2021-01-04 RX ORDER — 0.9 % SODIUM CHLORIDE 0.9 %
1000 INTRAVENOUS SOLUTION INTRAVENOUS ONCE
Status: COMPLETED | OUTPATIENT
Start: 2021-01-04 | End: 2021-01-04

## 2021-01-04 RX ORDER — HEPARIN SODIUM (PORCINE) LOCK FLUSH IV SOLN 100 UNIT/ML 100 UNIT/ML
500 SOLUTION INTRAVENOUS PRN
Status: DISCONTINUED | OUTPATIENT
Start: 2021-01-04 | End: 2021-01-05 | Stop reason: HOSPADM

## 2021-01-04 RX ORDER — SODIUM CHLORIDE 0.9 % (FLUSH) 0.9 %
10 SYRINGE (ML) INJECTION PRN
Status: DISCONTINUED | OUTPATIENT
Start: 2021-01-04 | End: 2021-01-05 | Stop reason: HOSPADM

## 2021-01-04 RX ORDER — DIPHENHYDRAMINE HYDROCHLORIDE 50 MG/ML
50 INJECTION INTRAMUSCULAR; INTRAVENOUS ONCE
Status: CANCELLED | OUTPATIENT
Start: 2021-01-04 | End: 2021-01-04

## 2021-01-04 RX ORDER — METHYLPREDNISOLONE SODIUM SUCCINATE 125 MG/2ML
125 INJECTION, POWDER, LYOPHILIZED, FOR SOLUTION INTRAMUSCULAR; INTRAVENOUS ONCE
Status: CANCELLED | OUTPATIENT
Start: 2021-01-04 | End: 2021-01-04

## 2021-01-04 RX ORDER — DEXTROSE MONOHYDRATE 50 MG/ML
INJECTION, SOLUTION INTRAVENOUS ONCE
Status: CANCELLED | OUTPATIENT
Start: 2021-01-04 | End: 2021-01-04

## 2021-01-04 RX ORDER — SODIUM CHLORIDE 0.9 % (FLUSH) 0.9 %
5 SYRINGE (ML) INJECTION PRN
Status: CANCELLED | OUTPATIENT
Start: 2021-01-04

## 2021-01-04 RX ORDER — SODIUM CHLORIDE 9 MG/ML
INJECTION, SOLUTION INTRAVENOUS CONTINUOUS
Status: CANCELLED | OUTPATIENT
Start: 2021-01-04

## 2021-01-04 RX ADMIN — DEXAMETHASONE SODIUM PHOSPHATE 20 MG: 4 INJECTION, SOLUTION INTRAMUSCULAR; INTRAVENOUS at 13:35

## 2021-01-04 RX ADMIN — SODIUM CHLORIDE, PRESERVATIVE FREE 10 ML: 5 INJECTION INTRAVENOUS at 12:48

## 2021-01-04 RX ADMIN — SODIUM CHLORIDE 1000 ML: 9 INJECTION, SOLUTION INTRAVENOUS at 12:48

## 2021-01-04 RX ADMIN — Medication 500 UNITS: at 15:14

## 2021-01-04 RX ADMIN — SODIUM CHLORIDE, PRESERVATIVE FREE 10 ML: 5 INJECTION INTRAVENOUS at 15:14

## 2021-01-04 NOTE — PROGRESS NOTES
Patient assessed for the following post IV hydration:    Dizziness   No  Lightheadedness  No      Acute nausea/vomiting No  Headache   No  Chest pain/pressure  No  Rash/itching   No  Shortness of breath  No      Patient tolerated IV decadron and IV hydration without any complications. Patient encouraged to call if further hydration is needed. Patient verbalizes understanding. Last vital signs:   /73   Pulse 97   Temp 98.1 °F (36.7 °C) (Oral)   Resp 20   SpO2 96%     Patient instructed if experience any of the above symptoms following today's infusion, he is to notify MD immediately or go to the emergency department. Discharge instructions given to patient. Verbalizes understanding. Ambulated off unit per self with belongings.

## 2021-01-05 RX ORDER — DIPHENHYDRAMINE HYDROCHLORIDE 50 MG/ML
50 INJECTION INTRAMUSCULAR; INTRAVENOUS ONCE
Status: CANCELLED | OUTPATIENT
Start: 2021-01-05 | End: 2021-01-05

## 2021-01-05 RX ORDER — METHYLPREDNISOLONE SODIUM SUCCINATE 125 MG/2ML
125 INJECTION, POWDER, LYOPHILIZED, FOR SOLUTION INTRAMUSCULAR; INTRAVENOUS ONCE
Status: CANCELLED | OUTPATIENT
Start: 2021-01-05 | End: 2021-01-05

## 2021-01-05 RX ORDER — SODIUM CHLORIDE 9 MG/ML
INJECTION, SOLUTION INTRAVENOUS ONCE
Status: CANCELLED | OUTPATIENT
Start: 2021-01-05 | End: 2021-01-05

## 2021-01-05 RX ORDER — SODIUM CHLORIDE 9 MG/ML
INJECTION, SOLUTION INTRAVENOUS CONTINUOUS
Status: CANCELLED | OUTPATIENT
Start: 2021-01-05

## 2021-01-05 RX ORDER — DEXTROSE MONOHYDRATE 50 MG/ML
INJECTION, SOLUTION INTRAVENOUS ONCE
Status: CANCELLED | OUTPATIENT
Start: 2021-01-05 | End: 2021-01-05

## 2021-01-05 RX ORDER — SODIUM CHLORIDE 0.9 % (FLUSH) 0.9 %
5 SYRINGE (ML) INJECTION PRN
Status: CANCELLED | OUTPATIENT
Start: 2021-01-05

## 2021-01-06 ENCOUNTER — HOSPITAL ENCOUNTER (OUTPATIENT)
Dept: INFUSION THERAPY | Age: 61
Discharge: HOME | End: 2021-01-06
Payer: COMMERCIAL

## 2021-01-06 ENCOUNTER — OFFICE VISIT (OUTPATIENT)
Dept: ONCOLOGY | Age: 61
End: 2021-01-06
Payer: COMMERCIAL

## 2021-01-06 VITALS
HEIGHT: 72 IN | TEMPERATURE: 98 F | OXYGEN SATURATION: 97 % | RESPIRATION RATE: 20 BRPM | HEART RATE: 94 BPM | DIASTOLIC BLOOD PRESSURE: 67 MMHG | WEIGHT: 302.2 LBS | SYSTOLIC BLOOD PRESSURE: 123 MMHG | BODY MASS INDEX: 40.93 KG/M2

## 2021-01-06 VITALS
RESPIRATION RATE: 18 BRPM | SYSTOLIC BLOOD PRESSURE: 129 MMHG | TEMPERATURE: 98.1 F | OXYGEN SATURATION: 97 % | HEART RATE: 84 BPM | DIASTOLIC BLOOD PRESSURE: 78 MMHG

## 2021-01-06 DIAGNOSIS — R11.2 NAUSEA AND VOMITING, INTRACTABILITY OF VOMITING NOT SPECIFIED, UNSPECIFIED VOMITING TYPE: ICD-10-CM

## 2021-01-06 DIAGNOSIS — C34.91 ADENOCARCINOMA, LUNG, RIGHT (HCC): Primary | ICD-10-CM

## 2021-01-06 DIAGNOSIS — C82.58 DIFFUSE FOLLICLE CENTER LYMPHOMA OF LYMPH NODES OF MULTIPLE REGIONS (HCC): ICD-10-CM

## 2021-01-06 DIAGNOSIS — D64.9 CHRONIC ANEMIA: ICD-10-CM

## 2021-01-06 LAB
BUN, WHOLE BLOOD: 31 MG/DL (ref 8–26)
CHLORIDE, WHOLE BLOOD: 103 MEQ/L (ref 98–109)
CREATININE, WHOLE BLOOD: 1.4 MG/DL (ref 0.5–1.2)
GFR, ESTIMATED ,CON: 54 ML/MIN/1.73M2
GLUCOSE, WHOLE BLOOD: 164 MG/DL (ref 70–108)
IONIZED CALCIUM, WHOLE BLOOD: 1.17 MMOL/L (ref 1.12–1.32)
POTASSIUM, WHOLE BLOOD: 3.9 MEQ/L (ref 3.5–4.9)
SODIUM, WHOLE BLOOD: 138 MEQ/L (ref 138–146)
TOTAL CO2, WHOLE BLOOD: 24 MEQ/L (ref 23–33)

## 2021-01-06 PROCEDURE — G8427 DOCREV CUR MEDS BY ELIG CLIN: HCPCS | Performed by: INTERNAL MEDICINE

## 2021-01-06 PROCEDURE — 80047 BASIC METABLC PNL IONIZED CA: CPT

## 2021-01-06 PROCEDURE — 1036F TOBACCO NON-USER: CPT | Performed by: INTERNAL MEDICINE

## 2021-01-06 PROCEDURE — 6360000002 HC RX W HCPCS: Performed by: INTERNAL MEDICINE

## 2021-01-06 PROCEDURE — 36591 DRAW BLOOD OFF VENOUS DEVICE: CPT

## 2021-01-06 PROCEDURE — 99214 OFFICE O/P EST MOD 30 MIN: CPT | Performed by: INTERNAL MEDICINE

## 2021-01-06 PROCEDURE — 90686 IIV4 VACC NO PRSV 0.5 ML IM: CPT | Performed by: INTERNAL MEDICINE

## 2021-01-06 PROCEDURE — 96361 HYDRATE IV INFUSION ADD-ON: CPT

## 2021-01-06 PROCEDURE — 80076 HEPATIC FUNCTION PANEL: CPT

## 2021-01-06 PROCEDURE — G8482 FLU IMMUNIZE ORDER/ADMIN: HCPCS | Performed by: INTERNAL MEDICINE

## 2021-01-06 PROCEDURE — G8417 CALC BMI ABV UP PARAM F/U: HCPCS | Performed by: INTERNAL MEDICINE

## 2021-01-06 PROCEDURE — 96365 THER/PROPH/DIAG IV INF INIT: CPT

## 2021-01-06 PROCEDURE — 3017F COLORECTAL CA SCREEN DOC REV: CPT | Performed by: INTERNAL MEDICINE

## 2021-01-06 PROCEDURE — 2580000003 HC RX 258: Performed by: INTERNAL MEDICINE

## 2021-01-06 PROCEDURE — 85025 COMPLETE CBC W/AUTO DIFF WBC: CPT

## 2021-01-06 PROCEDURE — 99211 OFF/OP EST MAY X REQ PHY/QHP: CPT

## 2021-01-06 PROCEDURE — G0008 ADMIN INFLUENZA VIRUS VAC: HCPCS | Performed by: INTERNAL MEDICINE

## 2021-01-06 RX ORDER — HEPARIN SODIUM (PORCINE) LOCK FLUSH IV SOLN 100 UNIT/ML 100 UNIT/ML
500 SOLUTION INTRAVENOUS PRN
Status: DISCONTINUED | OUTPATIENT
Start: 2021-01-06 | End: 2021-01-07 | Stop reason: HOSPADM

## 2021-01-06 RX ORDER — SODIUM CHLORIDE 0.9 % (FLUSH) 0.9 %
10 SYRINGE (ML) INJECTION PRN
Status: DISCONTINUED | OUTPATIENT
Start: 2021-01-06 | End: 2021-01-07 | Stop reason: HOSPADM

## 2021-01-06 RX ORDER — 0.9 % SODIUM CHLORIDE 0.9 %
1000 INTRAVENOUS SOLUTION INTRAVENOUS ONCE
Status: COMPLETED | OUTPATIENT
Start: 2021-01-06 | End: 2021-01-06

## 2021-01-06 RX ADMIN — INFLUENZA A VIRUS A/VICTORIA/2454/2019 IVR-207 (H1N1) ANTIGEN (PROPIOLACTONE INACTIVATED), INFLUENZA A VIRUS A/HONG KONG/2671/2019 IVR-208 (H3N2) ANTIGEN (PROPIOLACTONE INACTIVATED), INFLUENZA B VIRUS B/VICTORIA/705/2018 BVR-11 ANTIGEN (PROPIOLACTONE INACTIVATED), INFLUENZA B VIRUS B/PHUKET/3073/2013 BVR-1B ANTIGEN (PROPIOLACTONE INACTIVATED) 0.5 ML: 15; 15; 15; 15 INJECTION, SUSPENSION INTRAMUSCULAR at 13:33

## 2021-01-06 RX ADMIN — Medication 10 ML: at 15:45

## 2021-01-06 RX ADMIN — Medication 500 UNITS: at 15:46

## 2021-01-06 RX ADMIN — SODIUM CHLORIDE 1000 ML: 9 INJECTION, SOLUTION INTRAVENOUS at 12:58

## 2021-01-06 RX ADMIN — Medication 10 ML: at 11:26

## 2021-01-06 RX ADMIN — DEXAMETHASONE SODIUM PHOSPHATE 20 MG: 4 INJECTION, SOLUTION INTRAMUSCULAR; INTRAVENOUS at 13:31

## 2021-01-06 RX ADMIN — Medication 10 ML: at 11:25

## 2021-01-06 RX ADMIN — Medication 10 ML: at 12:58

## 2021-01-06 NOTE — PROGRESS NOTES
Patient assessed for the following post IV hydration:    Dizziness   No  Lightheadedness  No      Acute nausea/vomiting No  Headache   No  Chest pain/pressure  No  Rash/itching   No  Shortness of breath  No    Patient tolerated IV hydration without any complications. Last vital signs:   /78   Pulse 84   Temp 98.1 °F (36.7 °C) (Oral)   Resp 18   SpO2 97%       Patient instructed if experience any of the above symptoms following today's infusion, he is to notify MD immediately or go to the emergency department. Discharge instructions given to patient. Verbalizes understanding. Ambulated off unit per self with belongings.

## 2021-01-06 NOTE — PLAN OF CARE
Problem: Infection - Central Venous Catheter-Associated Bloodstream Infection:  Goal: Will show no infection signs and symptoms  Description: Will show no infection signs and symptoms  Outcome: Met This Shift  Note: Mediport site with no redness or warmth. Skin over port site intact with no signs of breakdown noted. Patient verbalizes signs/symptoms of port infection and when to notify the physician. Intervention: Infection risk assessment  Note: Discuss port maintenance, infection prevention, signs of infection, and when to call the doctor. Problem: Musculor/Skeletal Functional Status  Goal: Absence of falls  Outcome: Met This Shift  Note: Patient free of falls this visit. Intervention: Fall precautions  Note: Fall risks assessed. Precautions discussed. Call light within reach during visit. Problem: Intellectual/Education/Knowledge Deficit  Goal: Teaching initiated upon admission  Outcome: Met This Shift  Note: Patient verbalizes understanding to verbal information given on IV hydration and flu vaccine, including action and possible side effects. Aware to call MD if develop complications. Intervention: Verbal/written education provided  Note: Verbal education provided on IV hydration and flu vaccine prior to administration. Problem: Discharge Planning  Goal: Knowledge of discharge instructions  Description: Knowledge of discharge instructions  Outcome: Met This Shift  Note: Patient verbalizes understanding of discharge instructions, follow up appointment, and when to call physician if needed   Intervention: Interaction with patient/family and care team  Note: Discharge instructions given to pt and reviewed. Follow up appointments discussed. Care plan reviewed with patient. Patient verbalizes understanding of the plan of care and contributes to goal setting.

## 2021-01-07 LAB
ALBUMIN SERPL-MCNC: 4.1 G/DL (ref 3.5–5.1)
ALP BLD-CCNC: 53 U/L (ref 38–126)
ALT SERPL-CCNC: 29 U/L (ref 11–66)
ANISOCYTOSIS: PRESENT
AST SERPL-CCNC: 47 U/L (ref 5–40)
BASOPHILS # BLD: 0.6 %
BASOPHILS ABSOLUTE: 0 THOU/MM3 (ref 0–0.1)
BILIRUB SERPL-MCNC: 0.5 MG/DL (ref 0.3–1.2)
BILIRUBIN DIRECT: < 0.2 MG/DL (ref 0–0.3)
EOSINOPHIL # BLD: 4.6 %
EOSINOPHILS ABSOLUTE: 0.3 THOU/MM3 (ref 0–0.4)
ERYTHROCYTE [DISTWIDTH] IN BLOOD BY AUTOMATED COUNT: 18.4 % (ref 11.5–14.5)
ERYTHROCYTE [DISTWIDTH] IN BLOOD BY AUTOMATED COUNT: 66.4 FL (ref 35–45)
HCT VFR BLD CALC: 34 % (ref 42–52)
HEMOGLOBIN: 11 GM/DL (ref 14–18)
IMMATURE GRANS (ABS): 0.16 THOU/MM3 (ref 0–0.07)
IMMATURE GRANULOCYTES: 2.4 %
LYMPHOCYTES # BLD: 17.9 %
LYMPHOCYTES ABSOLUTE: 1.2 THOU/MM3 (ref 1–4.8)
MCH RBC QN AUTO: 31.8 PG (ref 26–33)
MCHC RBC AUTO-ENTMCNC: 32.4 GM/DL (ref 32.2–35.5)
MCV RBC AUTO: 98.3 FL (ref 80–94)
MONOCYTES # BLD: 12.7 %
MONOCYTES ABSOLUTE: 0.9 THOU/MM3 (ref 0.4–1.3)
NUCLEATED RED BLOOD CELLS: 0 /100 WBC
PLATELET # BLD: 176 THOU/MM3 (ref 130–400)
PLATELET ESTIMATE: ADEQUATE
PMV BLD AUTO: 10.8 FL (ref 9.4–12.4)
RBC # BLD: 3.46 MILL/MM3 (ref 4.7–6.1)
SCAN OF BLOOD SMEAR: NORMAL
SEG NEUTROPHILS: 61.8 %
SEGMENTED NEUTROPHILS ABSOLUTE COUNT: 4.2 THOU/MM3 (ref 1.8–7.7)
TOTAL PROTEIN: 6.5 G/DL (ref 6.1–8)
WBC # BLD: 6.8 THOU/MM3 (ref 4.8–10.8)

## 2021-01-07 RX ORDER — MAGNESIUM OXIDE 400 MG/1
TABLET ORAL
Qty: 15 TABLET | Refills: 0 | Status: SHIPPED | OUTPATIENT
Start: 2021-01-07

## 2021-01-12 ENCOUNTER — HOSPITAL ENCOUNTER (OUTPATIENT)
Dept: INFUSION THERAPY | Age: 61
Discharge: HOME | End: 2021-01-12
Payer: COMMERCIAL

## 2021-01-12 ENCOUNTER — TELEPHONE (OUTPATIENT)
Dept: ONCOLOGY | Age: 61
End: 2021-01-12

## 2021-01-12 VITALS
RESPIRATION RATE: 18 BRPM | TEMPERATURE: 98.5 F | DIASTOLIC BLOOD PRESSURE: 84 MMHG | SYSTOLIC BLOOD PRESSURE: 120 MMHG | HEART RATE: 95 BPM | OXYGEN SATURATION: 93 %

## 2021-01-12 DIAGNOSIS — C82.58 DIFFUSE FOLLICLE CENTER LYMPHOMA OF LYMPH NODES OF MULTIPLE REGIONS (HCC): Primary | ICD-10-CM

## 2021-01-12 PROCEDURE — 6360000002 HC RX W HCPCS: Performed by: INTERNAL MEDICINE

## 2021-01-12 PROCEDURE — 96361 HYDRATE IV INFUSION ADD-ON: CPT

## 2021-01-12 PROCEDURE — 2580000003 HC RX 258: Performed by: INTERNAL MEDICINE

## 2021-01-12 PROCEDURE — 96365 THER/PROPH/DIAG IV INF INIT: CPT

## 2021-01-12 PROCEDURE — 99211 OFF/OP EST MAY X REQ PHY/QHP: CPT

## 2021-01-12 RX ORDER — SODIUM CHLORIDE 9 MG/ML
INJECTION, SOLUTION INTRAVENOUS ONCE
Status: CANCELLED | OUTPATIENT
Start: 2021-01-12 | End: 2021-01-12

## 2021-01-12 RX ORDER — SODIUM CHLORIDE 0.9 % (FLUSH) 0.9 %
10 SYRINGE (ML) INJECTION PRN
Status: DISCONTINUED | OUTPATIENT
Start: 2021-01-12 | End: 2021-01-13 | Stop reason: HOSPADM

## 2021-01-12 RX ORDER — SODIUM CHLORIDE 0.9 % (FLUSH) 0.9 %
5 SYRINGE (ML) INJECTION PRN
Status: CANCELLED | OUTPATIENT
Start: 2021-01-12

## 2021-01-12 RX ORDER — DIPHENHYDRAMINE HYDROCHLORIDE 50 MG/ML
50 INJECTION INTRAMUSCULAR; INTRAVENOUS ONCE
Status: CANCELLED | OUTPATIENT
Start: 2021-01-12 | End: 2021-01-12

## 2021-01-12 RX ORDER — HEPARIN SODIUM (PORCINE) LOCK FLUSH IV SOLN 100 UNIT/ML 100 UNIT/ML
500 SOLUTION INTRAVENOUS PRN
Status: DISCONTINUED | OUTPATIENT
Start: 2021-01-12 | End: 2021-01-13 | Stop reason: HOSPADM

## 2021-01-12 RX ORDER — SODIUM CHLORIDE 9 MG/ML
INJECTION, SOLUTION INTRAVENOUS CONTINUOUS
Status: CANCELLED | OUTPATIENT
Start: 2021-01-12

## 2021-01-12 RX ORDER — 0.9 % SODIUM CHLORIDE 0.9 %
1000 INTRAVENOUS SOLUTION INTRAVENOUS ONCE
Status: COMPLETED | OUTPATIENT
Start: 2021-01-12 | End: 2021-01-12

## 2021-01-12 RX ORDER — DEXTROSE MONOHYDRATE 50 MG/ML
INJECTION, SOLUTION INTRAVENOUS ONCE
Status: CANCELLED | OUTPATIENT
Start: 2021-01-12 | End: 2021-01-12

## 2021-01-12 RX ORDER — METHYLPREDNISOLONE SODIUM SUCCINATE 125 MG/2ML
125 INJECTION, POWDER, LYOPHILIZED, FOR SOLUTION INTRAMUSCULAR; INTRAVENOUS ONCE
Status: CANCELLED | OUTPATIENT
Start: 2021-01-12 | End: 2021-01-12

## 2021-01-12 RX ADMIN — Medication 500 UNITS: at 14:02

## 2021-01-12 RX ADMIN — SODIUM CHLORIDE 1000 ML: 9 INJECTION, SOLUTION INTRAVENOUS at 11:30

## 2021-01-12 RX ADMIN — Medication 10 ML: at 14:02

## 2021-01-12 RX ADMIN — DEXAMETHASONE SODIUM PHOSPHATE 20 MG: 4 INJECTION, SOLUTION INTRAMUSCULAR; INTRAVENOUS at 12:01

## 2021-01-12 NOTE — PROGRESS NOTES
Patient assessed for the following post fluids :    Dizziness   No  Lightheadedness  No      Acute nausea/vomiting No  Headache   No  Chest pain/pressure  No  Rash/itching   No  Shortness of breath  No    Patient kept for 20 minutes observation post infusion. Patient tolerated hydration  without any complications. Last vital signs:   /84   Pulse 95   Temp 98.5 °F (36.9 °C) (Oral)   Resp 18   SpO2 93%       Patient instructed if experience any of the above symptoms following today's infusion,he is to notify MD immediately or go to the emergency department. Discharge instructions given to patient. Verbalizes understanding. Ambulated off unit per self  with belongings. patient drank one bottle of water while here and was up to the bathroom once.  States feels better after fluids

## 2021-01-13 ENCOUNTER — HOSPITAL ENCOUNTER (OUTPATIENT)
Dept: INFUSION THERAPY | Age: 61
Discharge: HOME | End: 2021-01-13
Payer: COMMERCIAL

## 2021-01-13 VITALS
BODY MASS INDEX: 41.55 KG/M2 | HEIGHT: 72 IN | WEIGHT: 306.8 LBS | OXYGEN SATURATION: 98 % | TEMPERATURE: 97.8 F | RESPIRATION RATE: 18 BRPM | SYSTOLIC BLOOD PRESSURE: 118 MMHG | HEART RATE: 79 BPM | DIASTOLIC BLOOD PRESSURE: 79 MMHG

## 2021-01-13 DIAGNOSIS — Z51.11 ENCOUNTER FOR CHEMOTHERAPY MANAGEMENT: ICD-10-CM

## 2021-01-13 DIAGNOSIS — C82.58 DIFFUSE FOLLICLE CENTER LYMPHOMA OF LYMPH NODES OF MULTIPLE REGIONS (HCC): Primary | ICD-10-CM

## 2021-01-13 PROCEDURE — 6360000002 HC RX W HCPCS: Performed by: INTERNAL MEDICINE

## 2021-01-13 PROCEDURE — 2580000003 HC RX 258: Performed by: INTERNAL MEDICINE

## 2021-01-13 PROCEDURE — 96361 HYDRATE IV INFUSION ADD-ON: CPT

## 2021-01-13 PROCEDURE — 96365 THER/PROPH/DIAG IV INF INIT: CPT

## 2021-01-13 RX ORDER — HEPARIN SODIUM (PORCINE) LOCK FLUSH IV SOLN 100 UNIT/ML 100 UNIT/ML
500 SOLUTION INTRAVENOUS PRN
Status: DISCONTINUED | OUTPATIENT
Start: 2021-01-13 | End: 2021-01-14 | Stop reason: HOSPADM

## 2021-01-13 RX ORDER — SODIUM CHLORIDE 9 MG/ML
INJECTION, SOLUTION INTRAVENOUS ONCE
Status: CANCELLED | OUTPATIENT
Start: 2021-01-13 | End: 2021-01-13

## 2021-01-13 RX ORDER — METHYLPREDNISOLONE SODIUM SUCCINATE 125 MG/2ML
125 INJECTION, POWDER, LYOPHILIZED, FOR SOLUTION INTRAMUSCULAR; INTRAVENOUS ONCE
Status: CANCELLED | OUTPATIENT
Start: 2021-01-13 | End: 2021-01-13

## 2021-01-13 RX ORDER — SODIUM CHLORIDE 9 MG/ML
INJECTION, SOLUTION INTRAVENOUS CONTINUOUS
Status: CANCELLED | OUTPATIENT
Start: 2021-01-13

## 2021-01-13 RX ORDER — HEPARIN SODIUM (PORCINE) LOCK FLUSH IV SOLN 100 UNIT/ML 100 UNIT/ML
500 SOLUTION INTRAVENOUS PRN
Status: CANCELLED | OUTPATIENT
Start: 2021-01-13

## 2021-01-13 RX ORDER — SODIUM CHLORIDE 0.9 % (FLUSH) 0.9 %
20 SYRINGE (ML) INJECTION PRN
Status: CANCELLED | OUTPATIENT
Start: 2021-01-13

## 2021-01-13 RX ORDER — SODIUM CHLORIDE 0.9 % (FLUSH) 0.9 %
20 SYRINGE (ML) INJECTION PRN
Status: DISCONTINUED | OUTPATIENT
Start: 2021-01-13 | End: 2021-01-14 | Stop reason: HOSPADM

## 2021-01-13 RX ORDER — SODIUM CHLORIDE 0.9 % (FLUSH) 0.9 %
5 SYRINGE (ML) INJECTION PRN
Status: CANCELLED | OUTPATIENT
Start: 2021-01-13

## 2021-01-13 RX ORDER — 0.9 % SODIUM CHLORIDE 0.9 %
1000 INTRAVENOUS SOLUTION INTRAVENOUS ONCE
Status: COMPLETED | OUTPATIENT
Start: 2021-01-13 | End: 2021-01-13

## 2021-01-13 RX ORDER — SODIUM CHLORIDE 0.9 % (FLUSH) 0.9 %
10 SYRINGE (ML) INJECTION PRN
Status: CANCELLED | OUTPATIENT
Start: 2021-01-13

## 2021-01-13 RX ORDER — DEXTROSE MONOHYDRATE 50 MG/ML
INJECTION, SOLUTION INTRAVENOUS ONCE
Status: CANCELLED | OUTPATIENT
Start: 2021-01-13 | End: 2021-01-13

## 2021-01-13 RX ORDER — DIPHENHYDRAMINE HYDROCHLORIDE 50 MG/ML
50 INJECTION INTRAMUSCULAR; INTRAVENOUS ONCE
Status: CANCELLED | OUTPATIENT
Start: 2021-01-13 | End: 2021-01-13

## 2021-01-13 RX ADMIN — Medication 500 UNITS: at 16:59

## 2021-01-13 RX ADMIN — SODIUM CHLORIDE 1000 ML: 9 INJECTION, SOLUTION INTRAVENOUS at 14:40

## 2021-01-13 RX ADMIN — SODIUM CHLORIDE, PRESERVATIVE FREE 20 ML: 5 INJECTION INTRAVENOUS at 14:39

## 2021-01-13 RX ADMIN — DEXAMETHASONE SODIUM PHOSPHATE 20 MG: 4 INJECTION, SOLUTION INTRAMUSCULAR; INTRAVENOUS at 14:44

## 2021-01-13 RX ADMIN — SODIUM CHLORIDE, PRESERVATIVE FREE 20 ML: 5 INJECTION INTRAVENOUS at 16:58

## 2021-01-17 NOTE — PROGRESS NOTES
[facility]  CANCER NETWORK OF [facility]  [facility] [address] [facility] 98  [address] [place] [address]  Dept: [phone]  Dept Fax: [phone]  Loc: [phone]     Subjective:      Chief Complaint:  Nabil Rodrigez is a 56 y.o. with lymphoma and adenocarcinoma of the lung. HPI:  Nabil is here today for follow-up regarding both his history of non-small cell adenocarcinoma of the lung and a history of non-Hodgkin's lymphoma. The patient has completed 2 cycles of chemotherapy treatment with cisplatin and Alimta. However, he is elected not to proceed with the other planned 2 cycles of therapy as the adjuvant treatment plan for his lung cancer. He has had significant nausea and vomiting that has been difficult to control. He states that he is only received minimal benefit from nausea control with the use of oral Zofran, oral Phenergan, and oral Compazine. The patient has received intravenous hydration on multiple occasions and her infusion center and at the same time receives Decadron as an antiemetic which she states is the best treatment that controls his nausea. He has not had fever or other signs of infection. His bowel and bladder habits have been normal.  He has not had diarrhea or constipation. The patient had multiple questions today regarding the COVID-19 virus and particularly the utility of the COVID-19 vaccine. We reviewed the potential benefits as well as the potential side effects related to the vaccine. In addition, we compared the potential complications of the vaccine compared to the potential complications of michele the virus. I have recommended to the patient receiving the vaccine once it has become available in the patient's subgroup. The patient does have chronic anemia most likely related to his chemotherapy treatment. He does not have any clear evidence of blood loss. PMH, SH, and FH:  I reviewed the patients medication list and allergy list as noted on the electronic medical record. The PMH, SH and FH were also reviewed as noted on the EMR. Review of Systems  The patient continues to have chronic nausea with intermittent vomiting. Objective:   Physical Exam  Vitals:    01/06/21 1145   BP: 123/67   Pulse: 94   Resp: 20   Temp: 98 °F (36.7 °C)   SpO2: 97%   Vitals reviewed and are stable. Constitutional: Well-developed. No acute distress. HENT: Normocephalic and atraumatic. Eyes: Pupils appear equal and reactive. Neck: Overall appearance is symmetrical. No identifiable masses. Pulmonary: Effort normal. No respiratory distress. .  Neurological: Alert and oriented to person, place, and time. Judgment and thought content normal.  Skin: Skin is warm and dry. No rash. Psychiatric: Mood and affect appropriate for the clinical situation. Data Analysis:    Hematology 1/6/2021 12/21/2020 11/30/2020   WBC 6.8 7.4 7.6   RBC 3.46 (L) 3.60 (L) 4.32 (L)   HGB 11.0 (L) 11.1 (L) 13.1 (L)   HCT 34.0 (L) 32.7 (L) 38.7 (L)   MCV 98.3 (H) 91 90   RDW  16.7 (H) 16.4 (H)    244 443 (H)     Assessment:   1. Follicular lymphoma stage III. 2.  Adenocarcinoma of the lung. 3.  Chronic anemia. 4.  Nausea/vomiting. Plan:   1. Discontinue chemotherapy with cisplatin and Alimta. 2.  Intravenous hydration with antiemetic therapy as needed to control symptoms. 3.  Monitor hemoglobin/hematocrit and for any signs of blood loss. 4.  Monitor for recurrence of either lymphoma and lung cancer. 5.  CT scans prior to return to clinic. Multiple questions were answered throughout the course the consultation. By the end of the consultation, all the patient's questions had been answered. The patient was asked to call if there were any questions or concerns prior to the next scheduled clinic visit. Lety Charlton M.D. Medical Director: [facility]  Cancer Network [place] [address], 1 [facility], [address] [place] [address], [address] [place] [address], [address] [place] [address] of the [facility] at the [facility]      **This report has been created using voice recognition software. It may contain minor errors which are inherent in voice recognition technology. **

## 2021-01-20 ENCOUNTER — HOSPITAL ENCOUNTER (OUTPATIENT)
Dept: INFUSION THERAPY | Age: 61
Discharge: HOME | End: 2021-01-20
Payer: COMMERCIAL

## 2021-01-20 ENCOUNTER — TELEPHONE (OUTPATIENT)
Dept: ONCOLOGY | Age: 61
End: 2021-01-20

## 2021-01-20 VITALS
BODY MASS INDEX: 41.08 KG/M2 | OXYGEN SATURATION: 96 % | RESPIRATION RATE: 18 BRPM | TEMPERATURE: 98.5 F | DIASTOLIC BLOOD PRESSURE: 71 MMHG | HEART RATE: 88 BPM | SYSTOLIC BLOOD PRESSURE: 112 MMHG | WEIGHT: 303 LBS

## 2021-01-20 DIAGNOSIS — C34.91 ADENOCARCINOMA, LUNG, RIGHT (HCC): ICD-10-CM

## 2021-01-20 DIAGNOSIS — C82.58 DIFFUSE FOLLICLE CENTER LYMPHOMA OF LYMPH NODES OF MULTIPLE REGIONS (HCC): Primary | ICD-10-CM

## 2021-01-20 LAB
CREATININE, WHOLE BLOOD: 1.3 MG/DL (ref 0.5–1.2)
GFR, ESTIMATED ,CON: 59 ML/MIN/1.73M2

## 2021-01-20 PROCEDURE — 96361 HYDRATE IV INFUSION ADD-ON: CPT

## 2021-01-20 PROCEDURE — 36591 DRAW BLOOD OFF VENOUS DEVICE: CPT

## 2021-01-20 PROCEDURE — 2580000003 HC RX 258: Performed by: INTERNAL MEDICINE

## 2021-01-20 PROCEDURE — 99211 OFF/OP EST MAY X REQ PHY/QHP: CPT

## 2021-01-20 PROCEDURE — 6360000002 HC RX W HCPCS: Performed by: INTERNAL MEDICINE

## 2021-01-20 PROCEDURE — 96365 THER/PROPH/DIAG IV INF INIT: CPT

## 2021-01-20 PROCEDURE — 82565 ASSAY OF CREATININE: CPT

## 2021-01-20 RX ORDER — SODIUM CHLORIDE 0.9 % (FLUSH) 0.9 %
10 SYRINGE (ML) INJECTION PRN
Status: DISCONTINUED | OUTPATIENT
Start: 2021-01-20 | End: 2021-01-21 | Stop reason: HOSPADM

## 2021-01-20 RX ORDER — DEXTROSE MONOHYDRATE 50 MG/ML
INJECTION, SOLUTION INTRAVENOUS ONCE
Status: CANCELLED | OUTPATIENT
Start: 2021-01-20 | End: 2021-01-20

## 2021-01-20 RX ORDER — METHYLPREDNISOLONE SODIUM SUCCINATE 125 MG/2ML
125 INJECTION, POWDER, LYOPHILIZED, FOR SOLUTION INTRAMUSCULAR; INTRAVENOUS ONCE
Status: CANCELLED | OUTPATIENT
Start: 2021-01-20 | End: 2021-01-20

## 2021-01-20 RX ORDER — DIPHENHYDRAMINE HYDROCHLORIDE 50 MG/ML
50 INJECTION INTRAMUSCULAR; INTRAVENOUS ONCE
Status: CANCELLED | OUTPATIENT
Start: 2021-01-20 | End: 2021-01-20

## 2021-01-20 RX ORDER — HEPARIN SODIUM (PORCINE) LOCK FLUSH IV SOLN 100 UNIT/ML 100 UNIT/ML
500 SOLUTION INTRAVENOUS PRN
Status: DISCONTINUED | OUTPATIENT
Start: 2021-01-20 | End: 2021-01-21 | Stop reason: HOSPADM

## 2021-01-20 RX ORDER — SODIUM CHLORIDE 9 MG/ML
INJECTION, SOLUTION INTRAVENOUS CONTINUOUS
Status: CANCELLED | OUTPATIENT
Start: 2021-01-20

## 2021-01-20 RX ORDER — SODIUM CHLORIDE 0.9 % (FLUSH) 0.9 %
5 SYRINGE (ML) INJECTION PRN
Status: CANCELLED | OUTPATIENT
Start: 2021-01-20

## 2021-01-20 RX ORDER — SODIUM CHLORIDE 9 MG/ML
INJECTION, SOLUTION INTRAVENOUS ONCE
Status: CANCELLED | OUTPATIENT
Start: 2021-01-20 | End: 2021-01-20

## 2021-01-20 RX ORDER — 0.9 % SODIUM CHLORIDE 0.9 %
1000 INTRAVENOUS SOLUTION INTRAVENOUS ONCE
Status: COMPLETED | OUTPATIENT
Start: 2021-01-20 | End: 2021-01-20

## 2021-01-20 RX ADMIN — SODIUM CHLORIDE, PRESERVATIVE FREE 10 ML: 5 INJECTION INTRAVENOUS at 13:43

## 2021-01-20 RX ADMIN — DEXAMETHASONE SODIUM PHOSPHATE 20 MG: 4 INJECTION, SOLUTION INTRAMUSCULAR; INTRAVENOUS at 12:04

## 2021-01-20 RX ADMIN — SODIUM CHLORIDE 1000 ML: 9 INJECTION, SOLUTION INTRAVENOUS at 11:08

## 2021-01-20 RX ADMIN — SODIUM CHLORIDE, PRESERVATIVE FREE 10 ML: 5 INJECTION INTRAVENOUS at 13:44

## 2021-01-20 RX ADMIN — SODIUM CHLORIDE, PRESERVATIVE FREE 10 ML: 5 INJECTION INTRAVENOUS at 11:08

## 2021-01-20 RX ADMIN — Medication 500 UNITS: at 13:44

## 2021-01-20 NOTE — PROGRESS NOTES
Patient assessed for the following post IV hydration:    Dizziness   No  Lightheadedness  No      Acute nausea/vomiting No  Headache   No  Chest pain/pressure  No  Rash/itching   No  Shortness of breath  No     Patient tolerated IV hydration without any complications. Last vital signs:   /71   Pulse 88   Temp 98.5 °F (36.9 °C) (Oral)   Resp 18   Wt (!) 303 lb (137.4 kg)   SpO2 96%   BMI 41.08 kg/m²       Patient instructed if experience any of the above symptoms following today's infusion, he is to notify MD immediately or go to the emergency department. Discharge instructions given to patient. Verbalizes understanding. Ambulated off unit per self with belongings.

## 2021-01-20 NOTE — PLAN OF CARE
Problem: Intellectual/Education/Knowledge Deficit  Goal: Teaching initiated upon admission  Outcome: Met This Shift  Note: Patient verbalizes understanding to verbal information given on IV hydration, including action and possible side effects. Aware to call MD if develop complications. Intervention: Verbal/written education provided  Note: Verbal education provided on IV hydration prior to administration. Patient encouraged to increase PO fluid intake. Problem: Discharge Planning  Goal: Knowledge of discharge instructions  Description: Knowledge of discharge instructions  Outcome: Met This Shift  Note: Patient verbalizes understanding of discharge instructions, follow up appointment, and when to call physician if needed   Intervention: Discharge to appropriate level of care  Note: Discharge instructions given to pt and reviewed. Follow up appointments discussed. Problem: Falls - Risk of:  Goal: Will remain free from falls  Description: Will remain free from falls  Outcome: Met This Shift  Note: Patient free of falls this visit. Intervention: Assess risk factors for falls  Note: Fall risks assessed. Precautions discussed. Call light within reach during visit. Problem: Infection - Central Venous Catheter-Associated Bloodstream Infection:  Goal: Will show no infection signs and symptoms  Description: Will show no infection signs and symptoms  Outcome: Met This Shift  Note: Mediport site with no redness or warmth. Skin over port site intact with no signs of breakdown noted. Patient verbalizes signs/symptoms of port infection and when to notify the physician. Intervention: Infection risk assessment  Note: Discuss port maintenance, infection prevention, signs of infection, and when to call the doctor. Care plan reviewed with patient. Patient verbalizes understanding of the plan of care and contributes to goal setting.

## 2021-01-25 ENCOUNTER — TELEPHONE (OUTPATIENT)
Dept: ONCOLOGY | Age: 61
End: 2021-01-25

## 2021-01-25 ENCOUNTER — HOSPITAL ENCOUNTER (OUTPATIENT)
Dept: INFUSION THERAPY | Age: 61
Discharge: HOME | End: 2021-01-25
Payer: COMMERCIAL

## 2021-01-25 VITALS
DIASTOLIC BLOOD PRESSURE: 78 MMHG | SYSTOLIC BLOOD PRESSURE: 133 MMHG | HEIGHT: 72 IN | RESPIRATION RATE: 18 BRPM | BODY MASS INDEX: 40.96 KG/M2 | WEIGHT: 302.4 LBS | HEART RATE: 93 BPM | TEMPERATURE: 97.7 F | OXYGEN SATURATION: 96 %

## 2021-01-25 DIAGNOSIS — C82.58 DIFFUSE FOLLICLE CENTER LYMPHOMA OF LYMPH NODES OF MULTIPLE REGIONS (HCC): Primary | ICD-10-CM

## 2021-01-25 PROCEDURE — 96365 THER/PROPH/DIAG IV INF INIT: CPT

## 2021-01-25 PROCEDURE — 6360000002 HC RX W HCPCS: Performed by: INTERNAL MEDICINE

## 2021-01-25 PROCEDURE — 2580000003 HC RX 258: Performed by: INTERNAL MEDICINE

## 2021-01-25 PROCEDURE — 99211 OFF/OP EST MAY X REQ PHY/QHP: CPT

## 2021-01-25 PROCEDURE — 96361 HYDRATE IV INFUSION ADD-ON: CPT

## 2021-01-25 RX ORDER — SODIUM CHLORIDE 9 MG/ML
INJECTION, SOLUTION INTRAVENOUS ONCE
Status: CANCELLED | OUTPATIENT
Start: 2021-01-25 | End: 2021-01-25

## 2021-01-25 RX ORDER — DEXTROSE MONOHYDRATE 50 MG/ML
INJECTION, SOLUTION INTRAVENOUS ONCE
Status: CANCELLED | OUTPATIENT
Start: 2021-01-25 | End: 2021-01-25

## 2021-01-25 RX ORDER — DIPHENHYDRAMINE HYDROCHLORIDE 50 MG/ML
50 INJECTION INTRAMUSCULAR; INTRAVENOUS ONCE
Status: CANCELLED | OUTPATIENT
Start: 2021-01-25 | End: 2021-01-25

## 2021-01-25 RX ORDER — SODIUM CHLORIDE 0.9 % (FLUSH) 0.9 %
5 SYRINGE (ML) INJECTION PRN
Status: CANCELLED | OUTPATIENT
Start: 2021-01-25

## 2021-01-25 RX ORDER — SODIUM CHLORIDE 0.9 % (FLUSH) 0.9 %
10 SYRINGE (ML) INJECTION PRN
Status: DISCONTINUED | OUTPATIENT
Start: 2021-01-25 | End: 2021-01-26 | Stop reason: HOSPADM

## 2021-01-25 RX ORDER — METHYLPREDNISOLONE SODIUM SUCCINATE 125 MG/2ML
125 INJECTION, POWDER, LYOPHILIZED, FOR SOLUTION INTRAMUSCULAR; INTRAVENOUS ONCE
Status: CANCELLED | OUTPATIENT
Start: 2021-01-25 | End: 2021-01-25

## 2021-01-25 RX ORDER — 0.9 % SODIUM CHLORIDE 0.9 %
1000 INTRAVENOUS SOLUTION INTRAVENOUS ONCE
Status: COMPLETED | OUTPATIENT
Start: 2021-01-25 | End: 2021-01-25

## 2021-01-25 RX ORDER — HEPARIN SODIUM (PORCINE) LOCK FLUSH IV SOLN 100 UNIT/ML 100 UNIT/ML
500 SOLUTION INTRAVENOUS PRN
Status: DISCONTINUED | OUTPATIENT
Start: 2021-01-25 | End: 2021-01-26 | Stop reason: HOSPADM

## 2021-01-25 RX ORDER — SODIUM CHLORIDE 9 MG/ML
INJECTION, SOLUTION INTRAVENOUS CONTINUOUS
Status: CANCELLED | OUTPATIENT
Start: 2021-01-25

## 2021-01-25 RX ADMIN — SODIUM CHLORIDE, PRESERVATIVE FREE 10 ML: 5 INJECTION INTRAVENOUS at 14:40

## 2021-01-25 RX ADMIN — DEXAMETHASONE SODIUM PHOSPHATE 20 MG: 4 INJECTION, SOLUTION INTRAMUSCULAR; INTRAVENOUS at 13:45

## 2021-01-25 RX ADMIN — SODIUM CHLORIDE 1000 ML: 9 INJECTION, SOLUTION INTRAVENOUS at 12:15

## 2021-01-25 RX ADMIN — Medication 500 UNITS: at 14:40

## 2021-01-25 RX ADMIN — SODIUM CHLORIDE, PRESERVATIVE FREE 10 ML: 5 INJECTION INTRAVENOUS at 12:15

## 2021-01-25 NOTE — PROGRESS NOTES
Patient assessed for the following post IV fluids:    Dizziness   No  Lightheadedness  No      Acute nausea/vomiting No  Headache   No  Chest pain/pressure  No  Rash/itching   No  Shortness of breath  No    Patient tolerated IV fluids without any complications. Last vital signs:   /78   Pulse 93   Temp 97.7 °F (36.5 °C) (Oral)   Resp 18   Ht 6' (1.829 m)   Wt (!) 302 lb 6.4 oz (137.2 kg)   SpO2 96%   BMI 41.01 kg/m²     Patient instructed if experience any of the above symptoms following today's infusion,he is to notify MD immediately or go to the emergency department. Discharge instructions given to patient. Verbalizes understanding. Ambulated off unit per self with belongings.

## 2021-01-25 NOTE — PLAN OF CARE
Care plan reviewed with patient. Patient verbalized understanding of the plan of care and contribute to goal setting. Problem: Intellectual/Education/Knowledge Deficit  Goal: Teaching initiated upon admission  Outcome: Met This Shift  Note: Patient verbalizes understanding to verbal information given on hydration and deacdron,action and possible side effects. Aware to call MD if develop complications. Intervention: Verbal/written education provided  Note: Discuss hydration, decadron, and importance of maintaining fluid intake     Problem: Discharge Planning  Goal: Knowledge of discharge instructions  Description: Knowledge of discharge instructions  Outcome: Met This Shift  Note: Verbalized understanding of discharge instructions, follow ups and when to call doctor   Intervention: Discharge to appropriate level of care  Note: Discuss discharge instructions, follow ups and when to call doctor. Problem: Falls - Risk of:  Goal: Will remain free from falls  Description: Will remain free from falls  Outcome: Met This Shift  Note: Free from falls while in infusion center. Verbalized understanding of fall prevention and to ask for assistance with ambulation   Intervention: Assess risk factors for falls  Description: Assess risk factors for falls  Note: Assess for fall risk, instruct to ask for assistance with ambulation      Problem: Infection - Central Venous Catheter-Associated Bloodstream Infection:  Goal: Will show no infection signs and symptoms  Description: Will show no infection signs and symptoms  Outcome: Met This Shift  Note: Mediport site with no redness or warmth. Skin over port intact with no signs of breakdown noted. Patient verbalizes signs/symptoms of port infection and when to notify the physician.     Intervention: Infection risk assessment  Description: Infection risk assessment  Note: Discuss port maintenance, infection prevention, signs and when to call

## 2021-02-02 ENCOUNTER — HOSPITAL ENCOUNTER (OUTPATIENT)
Dept: INFUSION THERAPY | Age: 61
Discharge: HOME | End: 2021-02-02
Payer: COMMERCIAL

## 2021-02-02 ENCOUNTER — OFFICE VISIT (OUTPATIENT)
Dept: ONCOLOGY | Age: 61
End: 2021-02-02
Payer: COMMERCIAL

## 2021-02-02 VITALS
WEIGHT: 308.6 LBS | DIASTOLIC BLOOD PRESSURE: 64 MMHG | HEIGHT: 72 IN | SYSTOLIC BLOOD PRESSURE: 108 MMHG | TEMPERATURE: 97.8 F | RESPIRATION RATE: 18 BRPM | BODY MASS INDEX: 41.8 KG/M2 | OXYGEN SATURATION: 95 % | HEART RATE: 96 BPM

## 2021-02-02 VITALS
HEART RATE: 96 BPM | TEMPERATURE: 97.7 F | BODY MASS INDEX: 41.01 KG/M2 | HEIGHT: 72 IN | DIASTOLIC BLOOD PRESSURE: 64 MMHG | OXYGEN SATURATION: 95 % | RESPIRATION RATE: 18 BRPM | SYSTOLIC BLOOD PRESSURE: 108 MMHG

## 2021-02-02 DIAGNOSIS — Z51.11 ENCOUNTER FOR CHEMOTHERAPY MANAGEMENT: ICD-10-CM

## 2021-02-02 DIAGNOSIS — D64.9 CHRONIC ANEMIA: ICD-10-CM

## 2021-02-02 DIAGNOSIS — C82.58 DIFFUSE FOLLICLE CENTER LYMPHOMA OF LYMPH NODES OF MULTIPLE REGIONS (HCC): ICD-10-CM

## 2021-02-02 DIAGNOSIS — C34.91 ADENOCARCINOMA, LUNG, RIGHT (HCC): Primary | ICD-10-CM

## 2021-02-02 LAB
ABSOLUTE IMMATURE GRANULOCYTE: 0.11 THOU/MM3 (ref 0–0.07)
ALBUMIN SERPL-MCNC: 3.8 G/DL (ref 3.5–5.1)
ALP BLD-CCNC: 59 U/L (ref 38–126)
ALT SERPL-CCNC: 22 U/L (ref 11–66)
AST SERPL-CCNC: 27 U/L (ref 5–40)
BASINOPHIL, AUTOMATED: 1 % (ref 0–3)
BASOPHILS ABSOLUTE: 0 THOU/MM3 (ref 0–0.1)
BILIRUB SERPL-MCNC: 0.6 MG/DL (ref 0.3–1.2)
BILIRUBIN DIRECT: < 0.2 MG/DL (ref 0–0.3)
BUN, WHOLE BLOOD: 18 MG/DL (ref 8–26)
CHLORIDE, WHOLE BLOOD: 104 MEQ/L (ref 98–109)
CREATININE, WHOLE BLOOD: 1.4 MG/DL (ref 0.5–1.2)
EOSINOPHILS ABSOLUTE: 0.4 THOU/MM3 (ref 0–0.4)
EOSINOPHILS RELATIVE PERCENT: 6 % (ref 0–4)
GFR, ESTIMATED ,CON: 54 ML/MIN/1.73M2
GLUCOSE, WHOLE BLOOD: 172 MG/DL (ref 70–108)
HCT VFR BLD CALC: 35.3 % (ref 42–52)
HEMOGLOBIN: 11.6 GM/DL (ref 14–18)
IMMATURE GRANULOCYTES: 2 %
IONIZED CALCIUM, WHOLE BLOOD: 1.1 MMOL/L (ref 1.12–1.32)
LYMPHOCYTES # BLD: 19 % (ref 15–47)
LYMPHOCYTES ABSOLUTE: 1.1 THOU/MM3 (ref 1–4.8)
MCH RBC QN AUTO: 32.2 PG (ref 26–33)
MCHC RBC AUTO-ENTMCNC: 32.9 GM/DL (ref 32.2–35.5)
MCV RBC AUTO: 98 FL (ref 80–94)
MONOCYTES ABSOLUTE: 0.6 THOU/MM3 (ref 0.4–1.3)
MONOCYTES: 10 % (ref 0–12)
PDW BLD-RTO: 14.7 % (ref 11.5–14.5)
PLATELET # BLD: 181 THOU/MM3 (ref 130–400)
PMV BLD AUTO: 9.7 FL (ref 9.4–12.4)
POTASSIUM, WHOLE BLOOD: 4.2 MEQ/L (ref 3.5–4.9)
RBC # BLD: 3.6 MILL/MM3 (ref 4.7–6.1)
SEG NEUTROPHILS: 62 % (ref 43–75)
SEGMENTED NEUTROPHILS ABSOLUTE COUNT: 3.5 THOU/MM3 (ref 1.8–7.7)
SODIUM, WHOLE BLOOD: 137 MEQ/L (ref 138–146)
TOTAL CO2, WHOLE BLOOD: 23 MEQ/L (ref 23–33)
TOTAL PROTEIN: 6.7 G/DL (ref 6.1–8)
WBC # BLD: 5.6 THOU/MM3 (ref 4.8–10.8)

## 2021-02-02 PROCEDURE — 2580000003 HC RX 258: Performed by: INTERNAL MEDICINE

## 2021-02-02 PROCEDURE — 36591 DRAW BLOOD OFF VENOUS DEVICE: CPT

## 2021-02-02 PROCEDURE — G8417 CALC BMI ABV UP PARAM F/U: HCPCS | Performed by: INTERNAL MEDICINE

## 2021-02-02 PROCEDURE — 99211 OFF/OP EST MAY X REQ PHY/QHP: CPT

## 2021-02-02 PROCEDURE — G8482 FLU IMMUNIZE ORDER/ADMIN: HCPCS | Performed by: INTERNAL MEDICINE

## 2021-02-02 PROCEDURE — 80047 BASIC METABLC PNL IONIZED CA: CPT

## 2021-02-02 PROCEDURE — G8427 DOCREV CUR MEDS BY ELIG CLIN: HCPCS | Performed by: INTERNAL MEDICINE

## 2021-02-02 PROCEDURE — 3017F COLORECTAL CA SCREEN DOC REV: CPT | Performed by: INTERNAL MEDICINE

## 2021-02-02 PROCEDURE — 85025 COMPLETE CBC W/AUTO DIFF WBC: CPT

## 2021-02-02 PROCEDURE — 99214 OFFICE O/P EST MOD 30 MIN: CPT | Performed by: INTERNAL MEDICINE

## 2021-02-02 PROCEDURE — 1036F TOBACCO NON-USER: CPT | Performed by: INTERNAL MEDICINE

## 2021-02-02 PROCEDURE — 80076 HEPATIC FUNCTION PANEL: CPT

## 2021-02-02 PROCEDURE — 6360000002 HC RX W HCPCS: Performed by: INTERNAL MEDICINE

## 2021-02-02 RX ORDER — SODIUM CHLORIDE 0.9 % (FLUSH) 0.9 %
10 SYRINGE (ML) INJECTION PRN
Status: CANCELLED | OUTPATIENT
Start: 2021-02-02

## 2021-02-02 RX ORDER — SODIUM CHLORIDE 0.9 % (FLUSH) 0.9 %
20 SYRINGE (ML) INJECTION PRN
Status: DISCONTINUED | OUTPATIENT
Start: 2021-02-02 | End: 2021-02-03 | Stop reason: HOSPADM

## 2021-02-02 RX ORDER — SODIUM CHLORIDE 0.9 % (FLUSH) 0.9 %
20 SYRINGE (ML) INJECTION PRN
Status: CANCELLED | OUTPATIENT
Start: 2021-02-02

## 2021-02-02 RX ORDER — HEPARIN SODIUM (PORCINE) LOCK FLUSH IV SOLN 100 UNIT/ML 100 UNIT/ML
500 SOLUTION INTRAVENOUS PRN
Status: DISCONTINUED | OUTPATIENT
Start: 2021-02-02 | End: 2021-02-03 | Stop reason: HOSPADM

## 2021-02-02 RX ORDER — HEPARIN SODIUM (PORCINE) LOCK FLUSH IV SOLN 100 UNIT/ML 100 UNIT/ML
500 SOLUTION INTRAVENOUS PRN
Status: CANCELLED | OUTPATIENT
Start: 2021-02-02

## 2021-02-02 RX ADMIN — SODIUM CHLORIDE, PRESERVATIVE FREE 20 ML: 5 INJECTION INTRAVENOUS at 10:35

## 2021-02-02 RX ADMIN — Medication 500 UNITS: at 11:58

## 2021-02-02 RX ADMIN — SODIUM CHLORIDE, PRESERVATIVE FREE 20 ML: 5 INJECTION INTRAVENOUS at 11:58

## 2021-02-02 NOTE — PLAN OF CARE
Care plan reviewed with patient. Patient verbalized understanding of the plan of care and contribute to goal setting. Problem: Discharge Planning  Goal: Knowledge of discharge instructions  Description: Knowledge of discharge instructions  Outcome: Met This Shift  Note: Discuss port maintenance, infection prevention, signs and when to call Dr      Problem: Falls - Risk of:  Goal: Will remain free from falls  Description: Will remain free from falls  Outcome: Met This Shift  Note: Free from falls while in infusion center. Verbalized understanding of fall prevention and to ask for assistance with ambulation   Intervention: Assess risk factors for falls  Description: Assess risk factors for falls  Note: Assess for fall risk, instruct to ask for assistance with ambulation      Problem: Infection - Central Venous Catheter-Associated Bloodstream Infection:  Goal: Will show no infection signs and symptoms  Description: Will show no infection signs and symptoms  Outcome: Met This Shift  Note: Assess for fall risk, instruct to ask for assistance with ambulation   Intervention: Infection risk assessment  Description: Infection risk assessment  Note: Mediport site with no redness or warmth. Skin over port intact with no signs of breakdown noted. Patient verbalizes signs/symptoms of port infection and when to notify the physician.

## 2021-02-02 NOTE — PROGRESS NOTES
Returned from [facility] dr [facility]al[facility] Tolerated well. Discharged in satisfactory condition.  Ambulated off unit per self

## 2021-02-26 NOTE — PROGRESS NOTES
[facility]  CANCER NETWORK OF [facility]  [facility] [address] [facility]  [address], [facility] [address]  [address]  Dept: [phone]  Dept Fax: [phone]  Loc: [phone]     Subjective:      Chief Complaint:  Nabil Florez is a 56 y.o. with lymphoma and adenocarcinoma of the lung. HPI:  The patient is here today for follow-up regarding a history of non-small cell lung cancer and a history of lymphoma. On January 27, 2021 the patient underwent CT scans of the chest abdomen and pelvis. The scans found interval postsurgical changes related to his history of a right upper lobectomy. There was a trace right-sided pleural effusion with adjacent atelectasis that is likely postoperative in nature. There were no findings to suggest metastatic disease or recurrence of lymphoma in the chest abdomen or pelvis. The patient is noted to have a dilated pulmonary artery suggestive of pulmonary hypertension. The results were reviewed with the patient today. The patient has had symptoms of chronic nausea, fatigue, and generalized weakness since his last chemotherapy treatment. He did receive adjuvant chemotherapy treatment for his lung cancer that was aborted due to the symptoms. He completed a total of 2 of planned 4 cycles of therapy. The patient states that these symptoms are beginning to improve. He does not have fever, cough, shortness of breath or other signs of infection at this time. The patient has no new symptoms suggestive of malignancy. His bowel and bladder habits have been fairly stable. The patient's ECOG performance status is level 1. Laboratory work from today was reviewed with the patient. He continues to have chronic anemia which is remained stable. His total white blood cell count and platelet count are normal. Multiple questions were answered throughout the course the consultation.   By the end of the consultation, all the patient's questions had been answered. The patient was asked to call if there were any questions or concerns prior to the next scheduled clinic visit. PMH, SH, and FH:  I reviewed the patients medication list and allergy list as noted on the electronic medical record. The PMH, SH and FH were also reviewed as noted on the EMR. Review of Systems  Constitutional: Fatigue. HENT: Negative. Eyes: Negative. Respiratory: Negative. Cardiovascular: Negative. Gastrointestinal: Nausea. Genitourinary: Negative. Musculoskeletal: Negative. Skin: Negative. Neurological: Negative. Hematological: Negative. Psychiatric/Behavioral: Negative. Objective:   Physical Exam  Vitals:    02/02/21 1045   BP: 108/64   Pulse: 96   Resp: 18   Temp: 97.8 °F (36.6 °C)   SpO2: 95%   Vitals reviewed and are stable. Constitutional: Well-developed. No acute distress. HENT: Normocephalic and atraumatic. Eyes: PERRL. No scleral icterus. Pulmonary: Effort normal. No respiratory distress. Musculoskeletal: Gait is normal. Muscle strength and tone appear normal.  Neurological: Alert and oriented to person, place, and time. Judgment and thought content normal.  Skin: Skin appears warm and dry. Psychiatric: Mood and affect appropriate for the clinical situation. Data Analysis:    Hematology 2/2/2021 1/6/2021 12/21/2020   WBC 5.6 6.8 7.4   RBC 3.60 (L) 3.46 (L) 3.60 (L)   HGB 11.6 (L) 11.0 (L) 11.1 (L)   HCT 35.3 (L) 34.0 (L) 32.7 (L)   MCV 98 (H) 98.3 (H) 91   RDW 14.7 (H)  16.7 (H)    176 244     Assessment:   1. Follicular lymphoma stage III. 2.  Adenocarcinoma of the lung. 3.  Chronic anemia    Plan:   1. Monitor for recurrence of lymphoma. 2.  Monitor for recurrence of non-small cell lung cancer. 3.  Monitor hemoglobin/hematocrit and for any signs of blood loss. Cynthia Nguyễn Director: [place]  Cancer Network [facility]  [address], 1 [facility], [address] [place] [address], [address] [place] [address], [address] of the [facility] at [facility]      **This report has been created using voice recognition software. It may contain minor errors which are inherent in voice recognition technology. **

## 2021-03-16 ENCOUNTER — HOSPITAL ENCOUNTER (OUTPATIENT)
Dept: INFUSION THERAPY | Age: 61
Discharge: HOME | End: 2021-03-16
Payer: COMMERCIAL

## 2021-03-16 VITALS
BODY MASS INDEX: 41.72 KG/M2 | HEART RATE: 90 BPM | WEIGHT: 307.6 LBS | SYSTOLIC BLOOD PRESSURE: 137 MMHG | DIASTOLIC BLOOD PRESSURE: 81 MMHG | OXYGEN SATURATION: 97 % | RESPIRATION RATE: 20 BRPM | TEMPERATURE: 97.3 F

## 2021-03-16 DIAGNOSIS — C82.58 DIFFUSE FOLLICLE CENTER LYMPHOMA OF LYMPH NODES OF MULTIPLE REGIONS (HCC): Primary | ICD-10-CM

## 2021-03-16 DIAGNOSIS — Z51.11 ENCOUNTER FOR CHEMOTHERAPY MANAGEMENT: ICD-10-CM

## 2021-03-16 PROCEDURE — 2580000003 HC RX 258: Performed by: INTERNAL MEDICINE

## 2021-03-16 PROCEDURE — 6360000002 HC RX W HCPCS: Performed by: INTERNAL MEDICINE

## 2021-03-16 PROCEDURE — 99212 OFFICE O/P EST SF 10 MIN: CPT

## 2021-03-16 RX ORDER — HEPARIN SODIUM (PORCINE) LOCK FLUSH IV SOLN 100 UNIT/ML 100 UNIT/ML
500 SOLUTION INTRAVENOUS PRN
Status: CANCELLED | OUTPATIENT
Start: 2021-03-16

## 2021-03-16 RX ORDER — HEPARIN SODIUM (PORCINE) LOCK FLUSH IV SOLN 100 UNIT/ML 100 UNIT/ML
500 SOLUTION INTRAVENOUS PRN
Status: DISCONTINUED | OUTPATIENT
Start: 2021-03-16 | End: 2021-03-17 | Stop reason: HOSPADM

## 2021-03-16 RX ORDER — SODIUM CHLORIDE 0.9 % (FLUSH) 0.9 %
20 SYRINGE (ML) INJECTION PRN
Status: DISCONTINUED | OUTPATIENT
Start: 2021-03-16 | End: 2021-03-17 | Stop reason: HOSPADM

## 2021-03-16 RX ORDER — SODIUM CHLORIDE 0.9 % (FLUSH) 0.9 %
20 SYRINGE (ML) INJECTION PRN
Status: CANCELLED | OUTPATIENT
Start: 2021-03-16

## 2021-03-16 RX ORDER — SODIUM CHLORIDE 0.9 % (FLUSH) 0.9 %
10 SYRINGE (ML) INJECTION PRN
Status: CANCELLED | OUTPATIENT
Start: 2021-03-16

## 2021-03-16 RX ADMIN — Medication 500 UNITS: at 14:16

## 2021-03-16 RX ADMIN — SODIUM CHLORIDE, PRESERVATIVE FREE 20 ML: 5 INJECTION INTRAVENOUS at 14:15

## 2021-04-21 RX ORDER — FOLIC ACID 1 MG/1
TABLET ORAL
Qty: 30 TABLET | Refills: 5 | Status: SHIPPED | OUTPATIENT
Start: 2021-04-21 | End: 2021-12-01 | Stop reason: SDUPTHER

## 2021-06-02 ENCOUNTER — OFFICE VISIT (OUTPATIENT)
Dept: ONCOLOGY | Age: 61
End: 2021-06-02
Payer: COMMERCIAL

## 2021-06-02 ENCOUNTER — HOSPITAL ENCOUNTER (OUTPATIENT)
Dept: INFUSION THERAPY | Age: 61
Discharge: HOME | End: 2021-06-02
Payer: COMMERCIAL

## 2021-06-02 VITALS
HEIGHT: 72 IN | RESPIRATION RATE: 18 BRPM | BODY MASS INDEX: 42.66 KG/M2 | TEMPERATURE: 98.1 F | HEART RATE: 83 BPM | WEIGHT: 315 LBS | SYSTOLIC BLOOD PRESSURE: 135 MMHG | DIASTOLIC BLOOD PRESSURE: 87 MMHG | OXYGEN SATURATION: 98 %

## 2021-06-02 DIAGNOSIS — Z51.11 ENCOUNTER FOR CHEMOTHERAPY MANAGEMENT: ICD-10-CM

## 2021-06-02 DIAGNOSIS — D64.9 CHRONIC ANEMIA: ICD-10-CM

## 2021-06-02 DIAGNOSIS — C82.58 DIFFUSE FOLLICLE CENTER LYMPHOMA OF LYMPH NODES OF MULTIPLE REGIONS (HCC): ICD-10-CM

## 2021-06-02 DIAGNOSIS — C34.91 ADENOCARCINOMA, LUNG, RIGHT (HCC): Primary | ICD-10-CM

## 2021-06-02 LAB
ABSOLUTE IMMATURE GRANULOCYTE: 0.01 THOU/MM3 (ref 0–0.07)
BASINOPHIL, AUTOMATED: 1 % (ref 0–3)
BASOPHILS ABSOLUTE: 0 THOU/MM3 (ref 0–0.1)
BUN, WHOLE BLOOD: 23 MG/DL (ref 8–26)
CHLORIDE, WHOLE BLOOD: 102 MEQ/L (ref 98–109)
CREATININE, WHOLE BLOOD: 1.4 MG/DL (ref 0.5–1.2)
EOSINOPHILS ABSOLUTE: 0.5 THOU/MM3 (ref 0–0.4)
EOSINOPHILS RELATIVE PERCENT: 6 % (ref 0–4)
GFR, ESTIMATED ,CON: 54 ML/MIN/1.73M2
GLUCOSE, WHOLE BLOOD: 189 MG/DL (ref 70–108)
HCT VFR BLD CALC: 40.8 % (ref 42–52)
HEMOGLOBIN: 13.6 GM/DL (ref 14–18)
IMMATURE GRANULOCYTES: 0 %
IONIZED CALCIUM, WHOLE BLOOD: 1.09 MMOL/L (ref 1.12–1.32)
LYMPHOCYTES # BLD: 18 % (ref 15–47)
LYMPHOCYTES ABSOLUTE: 1.6 THOU/MM3 (ref 1–4.8)
MCH RBC QN AUTO: 30.1 PG (ref 26–33)
MCHC RBC AUTO-ENTMCNC: 33.3 GM/DL (ref 32.2–35.5)
MCV RBC AUTO: 90 FL (ref 80–94)
MONOCYTES ABSOLUTE: 0.6 THOU/MM3 (ref 0.4–1.3)
MONOCYTES: 7 % (ref 0–12)
PDW BLD-RTO: 13.3 % (ref 11.5–14.5)
PLATELET # BLD: 218 THOU/MM3 (ref 130–400)
PMV BLD AUTO: 9.9 FL (ref 9.4–12.4)
POTASSIUM, WHOLE BLOOD: 4.5 MEQ/L (ref 3.5–4.9)
RBC # BLD: 4.52 MILL/MM3 (ref 4.7–6.1)
SEG NEUTROPHILS: 68 % (ref 43–75)
SEGMENTED NEUTROPHILS ABSOLUTE COUNT: 5.7 THOU/MM3 (ref 1.8–7.7)
SODIUM, WHOLE BLOOD: 139 MEQ/L (ref 138–146)
TOTAL CO2, WHOLE BLOOD: 26 MEQ/L (ref 23–33)
WBC # BLD: 8.5 THOU/MM3 (ref 4.8–10.8)

## 2021-06-02 PROCEDURE — 36415 COLL VENOUS BLD VENIPUNCTURE: CPT

## 2021-06-02 PROCEDURE — 80047 BASIC METABLC PNL IONIZED CA: CPT

## 2021-06-02 PROCEDURE — 83615 LACTATE (LD) (LDH) ENZYME: CPT

## 2021-06-02 PROCEDURE — 80076 HEPATIC FUNCTION PANEL: CPT

## 2021-06-02 PROCEDURE — G8427 DOCREV CUR MEDS BY ELIG CLIN: HCPCS | Performed by: INTERNAL MEDICINE

## 2021-06-02 PROCEDURE — G8417 CALC BMI ABV UP PARAM F/U: HCPCS | Performed by: INTERNAL MEDICINE

## 2021-06-02 PROCEDURE — 99214 OFFICE O/P EST MOD 30 MIN: CPT | Performed by: INTERNAL MEDICINE

## 2021-06-02 PROCEDURE — 85025 COMPLETE CBC W/AUTO DIFF WBC: CPT

## 2021-06-02 PROCEDURE — 3017F COLORECTAL CA SCREEN DOC REV: CPT | Performed by: INTERNAL MEDICINE

## 2021-06-02 PROCEDURE — 1036F TOBACCO NON-USER: CPT | Performed by: INTERNAL MEDICINE

## 2021-06-02 PROCEDURE — 99211 OFF/OP EST MAY X REQ PHY/QHP: CPT

## 2021-06-02 RX ORDER — HEPARIN SODIUM (PORCINE) LOCK FLUSH IV SOLN 100 UNIT/ML 100 UNIT/ML
500 SOLUTION INTRAVENOUS PRN
Status: CANCELLED | OUTPATIENT
Start: 2021-06-02

## 2021-06-02 RX ORDER — SODIUM CHLORIDE 0.9 % (FLUSH) 0.9 %
10 SYRINGE (ML) INJECTION PRN
Status: DISCONTINUED | OUTPATIENT
Start: 2021-06-02 | End: 2021-06-03 | Stop reason: HOSPADM

## 2021-06-02 RX ORDER — SODIUM CHLORIDE 0.9 % (FLUSH) 0.9 %
10 SYRINGE (ML) INJECTION PRN
Status: CANCELLED | OUTPATIENT
Start: 2021-06-02

## 2021-06-02 RX ORDER — SODIUM CHLORIDE 0.9 % (FLUSH) 0.9 %
20 SYRINGE (ML) INJECTION PRN
Status: CANCELLED | OUTPATIENT
Start: 2021-06-02

## 2021-06-02 RX ORDER — SODIUM CHLORIDE 0.9 % (FLUSH) 0.9 %
20 SYRINGE (ML) INJECTION PRN
Status: DISCONTINUED | OUTPATIENT
Start: 2021-06-02 | End: 2021-06-03 | Stop reason: HOSPADM

## 2021-06-02 RX ORDER — HEPARIN SODIUM (PORCINE) LOCK FLUSH IV SOLN 100 UNIT/ML 100 UNIT/ML
500 SOLUTION INTRAVENOUS PRN
Status: DISCONTINUED | OUTPATIENT
Start: 2021-06-02 | End: 2021-06-03 | Stop reason: HOSPADM

## 2021-06-03 LAB
ALBUMIN SERPL-MCNC: 4.1 G/DL (ref 3.5–5.1)
ALP BLD-CCNC: 69 U/L (ref 38–126)
ALT SERPL-CCNC: 19 U/L (ref 11–66)
AST SERPL-CCNC: 25 U/L (ref 5–40)
BILIRUB SERPL-MCNC: 0.5 MG/DL (ref 0.3–1.2)
BILIRUBIN DIRECT: < 0.2 MG/DL (ref 0–0.3)
LD: 220 U/L (ref 100–190)
TOTAL PROTEIN: 7 G/DL (ref 6.1–8)

## 2021-06-24 NOTE — PROGRESS NOTES
4/12/2018         RE: Jf Díaz  [address]  [place] [address]        Dear Colleague,    Thank you for referring your patient, Jf Díaz, to the [facility]. Please see a copy of my visit note below.    Office Visit-Follow up    Chief Complaint: Jf Díaz is a 57 year old male who is being seen for   Chief Complaint   Patient presents with     RECHECK     left medial malleolus with routine healing, Left calf strain-DOI;2/10/2018       History of Present Illness:   4/12/18 visit:  Returns to clinic.  Follow up from previous.  Feels the ankle/fracture pain is completely resolved.  States walking is going fine.  States no foot pain.  States the pain behind his foot and up the calf is better, but the weakness persists.  States that he cannot stand on one leg and raise himself up with the ankle.  He can on the other leg.  States the weakness is really no better, but pain has improved.  No real pain, tenderness now.  Feels that his gait is off.  No numbness/tingling.  Has been doing at home exercises to strengthen the Maple's tendon without improvement.  Stopped the fracture boot previous.  States no new injuries.  Unsure if the Maple's symptoms started at the 2/12/18 injury or the 1/7/18 injury.   2/12/18 visit  On February 10, 2018 he slipped on the ice.  He had his fracture boot on when this occurred.  In the process of getting up he felt pain in the posterior calf area.  It is since improved.  February 7, 2018 visit:  Returns for follow-up.  Very minimal pain.  He has been compliant with instructions.  January 17 2018 visit:  Returns to clinic.  States pain improving.  He also noted that after the injury he had an immediate pain to posterior calf near ankle, this pain is improving.  Swelling improving to ankle and calf.  Pain tolerable.  Back to work.  States compliant with fracture boot, crutches, and non-weight bearing.    Patient recommended for BID 325mg ASA  [facility]ENTER  CANCER NETWORK OF [facility]  [facility] [address] [facility] 98  [address], [facility] [address]  [address]  Dept: [phone]  Dept Fax: [phone]  Loc: [phone]     Subjective:      Chief Complaint:  Nabil Kennedy is a 56 y.o. with lymphoma and adenocarcinoma of the lung. HPI:  Nabil is here today for follow-up regarding both his history of non-small cell lung cancer as well as a history of lymphoma. On today's evaluation his general sense of wellbeing has been stable. He does not have any specific signs or symptoms that be suggestive of recurrence or progression of either malignancy. The patient denies skeletal pain. He has not had fever, cough, shortness of breath or other signs of infection. The patient denies unintentional weight loss, unexplained fever, night sweats, or hot flashes. Both his bowel bladder habits have been normal.  He has not seen blood in his stool or urine. On his CBC his white blood cell count and platelet count are normal.  He has had improvement with his anemia as his hemoglobin hematocrit have increased. The patient does continue to have mild chronic anemia which is likely related to his previous treatment. He received chemotherapy for his lymphoma and chemotherapy for his lung malignancy. Currently he has no specific side effects that he relates to his chemotherapy treatment. The patient continues to complain of generalized weakness and fatigue. His ECOG performance status is level 0-1. PMH, SH, and FH:  I reviewed the patients medication list and allergy list as noted on the electronic medical record. The PMH, SH and FH were also reviewed as noted on the EMR. Review of Systems  Constitutional: Negative. HENT: Negative. Eyes: Negative. Respiratory: Negative. Cardiovascular: Negative. Gastrointestinal: Negative. Genitourinary: Negative.    Musculoskeletal: Negative. Skin: Negative. Neurological: Negative. Hematological: Negative. Psychiatric/Behavioral: Negative. Objective:   Physical Exam  Vitals:    06/02/21 1206   BP: 135/87   Pulse: 83   Resp: 18   Temp: 98.1 °F (36.7 °C)   SpO2: 98%   Vitals reviewed and are stable. Constitutional: Well-developed. No acute distress. HENT: Normocephalic and atraumatic. Eyes: PERRL. No scleral icterus. Neck: Overall appearance is symmetrical. No identifiable mass. Pulmonary: Effort normal. No respiratory distress. Cardiovascular: RRR. No edema in any of the four extremities. Abdominal: No hepatomegaly or splenomegaly. Musculoskeletal: Gait is normal. Muscle strength and tone appear normal.  Neurological: Alert and oriented to person, place, and time. Judgment and thought content normal.  Skin: Skin appears warm and dry. Psychiatric: Mood and affect appropriate for the clinical situation. Data Analysis:    Hematology 6/2/2021 2/2/2021 1/6/2021   WBC 8.5 5.6 6.8   RBC 4.52 (L) 3.60 (L) 3.46 (L)   HGB 13.6 (L) 11.6 (L) 11.0 (L)   HCT 40.8 (L) 35.3 (L) 34.0 (L)   MCV 90 98 (H) 98.3 (H)   RDW 13.3 14.7 (H)     181 176     Assessment:   1. Follicular lymphoma stage III. 2.  Adenocarcinoma of the lung. 3.  Chronic anemia    Plan:   1. Monitor for recurrence of lymphoma. 2.  Monitor for recurrence of non-small cell lung cancer. 3.  Monitor hemoglobin/hematocrit and for any signs of blood loss. 4.  CT scans prior to RTC. Multiple questions were answered throughout the course the consultation. By the end of the consultation, all the patient's questions had been answered. The patient was asked to call if there were any questions or concerns prior to the next scheduled clinic visit. Venessa Izquierdo M.D.                                                                          Medical Director: Shaheed Raman at last visit due to a CVA following a previous hx of contralateral ankle fracture.  Has not started aspirin yet.  Stated he will be.  Did not want an Rx for this.   1/11/18 visit:  On January 7, 2018 had a twisting event injuring his left ankle.  Subsequently evaluated and diagnosed with a medial malleolus fracture.  He is placed in a splint and given crutches.  He has been taking ibuprofen for pain.  No pre-existing injury.   History of a contralateral right tibia fracture requiring surgery.    Physical Exam:  Vitals: /82 (BP Location: Right arm, Patient Position: Chair, Cuff Size: Adult Large)  Temp 97.7  F (36.5  C) (Temporal)  Ht 1.829 m (6')  Wt 112 kg (247 lb)  BMI 33.5 kg/m2  BMI= Body mass index is 33.5 kg/(m^2).  Constitutional: healthy, alert and no acute distress   Psychiatric: mentation appears normal and affect normal/bright  NEURO: no focal deficits  RESP: Normal with easy respirations and no use of accessory muscles to breathe, no audible wheezing or retractions  CV: LLE:  no edema         Regular rate and rhythm by palpation  SKIN: No erythema, rashes, excoriation, or breakdown. No evidence of infection.   JOINT/EXTREMITIES:left Ankle Exam:   Lower leg:possible mild deformity around koby's tendon insertion. No tenderness with palpation throughout ankle/foot/posterior calf. No swelling, erythema, heat.  Able to df/pf against resistance without pain. 5-/5 on left for df.  While doing one legged stand on left, unable to lift himself up with DF motion. Able to do this on right. Slightly limping gait on left.  No pain with testing.  Rest of ankle/foot normal ROM. No tenderness at med/lat malleolus, no tenderness at mortise.  Negative drawer.  No tenderness to foot and metatarsals.   2+ distal radius pulse, cap refill <2, sensation intact to foot.    Diagnostic Modalities:  None today.  Previous imaging reviewed.  Independent visualization of the images was performed.      Impression: left  [facility], [address] [place] [address], [address] [place] [address], [address] [place] [address], [address] [facility] [address] of the [facility] RAF[place] at the [facility]      **This report has been created using voice recognition software. It may contain minor errors which are inherent in voice recognition technology. ** side Cedric's weakness.    Plan:  All of the above pertinent physical exam and imaging modalities findings was reviewed with Jf.  Given the weakness and possible subtle deformity recommended MRI for full evaluation of Ferrum's tendon. Symptomatic and by exam the fracture is no longer bothersome.     Return to clinic to discuss test results, or sooner as needed for changes.  Re-x-ray on return: No    Scribed by:  David Rosales, APRN, CNP, DNP  10:12 AM  4/12/2018    I attest I have seen and evaluated the patient.  I agree with above impression and plan.  Antolin Nguyễn D.O.          Again, thank you for allowing me to participate in the care of your patient.        Sincerely,        Mannie Nguyễn, DO

## 2021-09-20 RX ORDER — PROCHLORPERAZINE MALEATE 10 MG
TABLET ORAL
Qty: 60 TABLET | Refills: 5 | Status: SHIPPED | OUTPATIENT
Start: 2021-09-20 | End: 2021-12-01 | Stop reason: ALTCHOICE

## 2021-12-01 ENCOUNTER — HOSPITAL ENCOUNTER (OUTPATIENT)
Dept: INFUSION THERAPY | Age: 61
Discharge: HOME | End: 2021-12-01
Payer: COMMERCIAL

## 2021-12-01 ENCOUNTER — HOSPITAL ENCOUNTER (OUTPATIENT)
Dept: INFUSION THERAPY | Age: 61
End: 2021-12-01
Payer: COMMERCIAL

## 2021-12-01 ENCOUNTER — OFFICE VISIT (OUTPATIENT)
Dept: ONCOLOGY | Age: 61
End: 2021-12-01
Payer: COMMERCIAL

## 2021-12-01 VITALS
DIASTOLIC BLOOD PRESSURE: 87 MMHG | OXYGEN SATURATION: 95 % | RESPIRATION RATE: 20 BRPM | BODY MASS INDEX: 42.31 KG/M2 | SYSTOLIC BLOOD PRESSURE: 134 MMHG | TEMPERATURE: 97.9 F | WEIGHT: 312.4 LBS | HEIGHT: 72 IN | HEART RATE: 87 BPM

## 2021-12-01 DIAGNOSIS — C82.58 DIFFUSE FOLLICLE CENTER LYMPHOMA OF LYMPH NODES OF MULTIPLE REGIONS (HCC): ICD-10-CM

## 2021-12-01 DIAGNOSIS — D64.9 CHRONIC ANEMIA: ICD-10-CM

## 2021-12-01 DIAGNOSIS — C34.91 ADENOCARCINOMA, LUNG, RIGHT (HCC): ICD-10-CM

## 2021-12-01 DIAGNOSIS — C34.91 ADENOCARCINOMA, LUNG, RIGHT (HCC): Primary | ICD-10-CM

## 2021-12-01 LAB
ABSOLUTE IMMATURE GRANULOCYTE: 0.03 THOU/MM3 (ref 0–0.07)
BASINOPHIL, AUTOMATED: 1 % (ref 0–3)
BASOPHILS ABSOLUTE: 0.1 THOU/MM3 (ref 0–0.1)
BUN, WHOLE BLOOD: 19 MG/DL (ref 8–26)
CHLORIDE, WHOLE BLOOD: 101 MEQ/L (ref 98–109)
CREATININE, WHOLE BLOOD: 1.4 MG/DL (ref 0.5–1.2)
EOSINOPHILS ABSOLUTE: 0.4 THOU/MM3 (ref 0–0.4)
EOSINOPHILS RELATIVE PERCENT: 5 % (ref 0–4)
GFR, ESTIMATED ,CON: 54 ML/MIN/1.73M2
GLUCOSE, WHOLE BLOOD: 187 MG/DL (ref 70–108)
HCT VFR BLD CALC: 42 % (ref 42–52)
HEMOGLOBIN: 13.7 GM/DL (ref 14–18)
IMMATURE GRANULOCYTES: 0 %
IONIZED CALCIUM, WHOLE BLOOD: 1.18 MMOL/L (ref 1.12–1.32)
LYMPHOCYTES # BLD: 21 % (ref 15–47)
LYMPHOCYTES ABSOLUTE: 1.7 THOU/MM3 (ref 1–4.8)
MCH RBC QN AUTO: 29.7 PG (ref 26–33)
MCHC RBC AUTO-ENTMCNC: 32.6 GM/DL (ref 32.2–35.5)
MCV RBC AUTO: 91 FL (ref 80–94)
MONOCYTES ABSOLUTE: 0.5 THOU/MM3 (ref 0.4–1.3)
MONOCYTES: 7 % (ref 0–12)
PDW BLD-RTO: 13.3 % (ref 11.5–14.5)
PLATELET # BLD: 222 THOU/MM3 (ref 130–400)
PMV BLD AUTO: 9.7 FL (ref 9.4–12.4)
POTASSIUM, WHOLE BLOOD: 4.4 MEQ/L (ref 3.5–4.9)
RBC # BLD: 4.61 MILL/MM3 (ref 4.7–6.1)
SEG NEUTROPHILS: 66 % (ref 43–75)
SEGMENTED NEUTROPHILS ABSOLUTE COUNT: 5.2 THOU/MM3 (ref 1.8–7.7)
SODIUM, WHOLE BLOOD: 139 MEQ/L (ref 138–146)
TOTAL CO2, WHOLE BLOOD: 27 MEQ/L (ref 23–33)
WBC # BLD: 7.9 THOU/MM3 (ref 4.8–10.8)

## 2021-12-01 PROCEDURE — 3017F COLORECTAL CA SCREEN DOC REV: CPT | Performed by: INTERNAL MEDICINE

## 2021-12-01 PROCEDURE — 99214 OFFICE O/P EST MOD 30 MIN: CPT | Performed by: INTERNAL MEDICINE

## 2021-12-01 PROCEDURE — 99211 OFF/OP EST MAY X REQ PHY/QHP: CPT

## 2021-12-01 PROCEDURE — 1036F TOBACCO NON-USER: CPT | Performed by: INTERNAL MEDICINE

## 2021-12-01 PROCEDURE — 85025 COMPLETE CBC W/AUTO DIFF WBC: CPT

## 2021-12-01 PROCEDURE — G8417 CALC BMI ABV UP PARAM F/U: HCPCS | Performed by: INTERNAL MEDICINE

## 2021-12-01 PROCEDURE — G8427 DOCREV CUR MEDS BY ELIG CLIN: HCPCS | Performed by: INTERNAL MEDICINE

## 2021-12-01 PROCEDURE — 36415 COLL VENOUS BLD VENIPUNCTURE: CPT

## 2021-12-01 PROCEDURE — 83615 LACTATE (LD) (LDH) ENZYME: CPT

## 2021-12-01 PROCEDURE — 80076 HEPATIC FUNCTION PANEL: CPT

## 2021-12-01 PROCEDURE — 80047 BASIC METABLC PNL IONIZED CA: CPT

## 2021-12-01 PROCEDURE — G8484 FLU IMMUNIZE NO ADMIN: HCPCS | Performed by: INTERNAL MEDICINE

## 2021-12-01 RX ORDER — PROMETHAZINE HYDROCHLORIDE 25 MG/1
25 TABLET ORAL DAILY
COMMUNITY
End: 2022-06-01 | Stop reason: ALTCHOICE

## 2021-12-01 RX ORDER — PROCHLORPERAZINE MALEATE 10 MG
10 TABLET ORAL DAILY
COMMUNITY
End: 2022-06-01 | Stop reason: ALTCHOICE

## 2021-12-01 RX ORDER — SODIUM CHLORIDE 0.9 % (FLUSH) 0.9 %
10 SYRINGE (ML) INJECTION PRN
OUTPATIENT
Start: 2021-12-01

## 2021-12-01 RX ORDER — SODIUM CHLORIDE 0.9 % (FLUSH) 0.9 %
20 SYRINGE (ML) INJECTION PRN
OUTPATIENT
Start: 2021-12-01

## 2021-12-01 RX ORDER — HEPARIN SODIUM (PORCINE) LOCK FLUSH IV SOLN 100 UNIT/ML 100 UNIT/ML
500 SOLUTION INTRAVENOUS PRN
OUTPATIENT
Start: 2021-12-01

## 2021-12-02 LAB
ALBUMIN SERPL-MCNC: 4.1 G/DL (ref 3.5–5.1)
ALP BLD-CCNC: 67 U/L (ref 38–126)
ALT SERPL-CCNC: 18 U/L (ref 11–66)
AST SERPL-CCNC: 23 U/L (ref 5–40)
BILIRUB SERPL-MCNC: 0.4 MG/DL (ref 0.3–1.2)
BILIRUBIN DIRECT: < 0.2 MG/DL (ref 0–0.3)
LD: 170 U/L (ref 100–190)
TOTAL PROTEIN: 7.3 G/DL (ref 6.1–8)

## 2021-12-06 RX ORDER — FOLIC ACID 1 MG/1
TABLET ORAL
Qty: 30 TABLET | Refills: 5 | Status: SHIPPED | OUTPATIENT
Start: 2021-12-06 | End: 2022-06-01 | Stop reason: ALTCHOICE

## 2021-12-20 NOTE — PROGRESS NOTES
[facility]ENTER  CANCER NETWORK OF [facility]  [facility] [address] [facility] 98  [address], [facility] [address] [place] [address]  Dept: [phone]  Dept Fax: [phone]  Loc: [phone]     Subjective:      Chief Complaint:  Nabil Perales is a 59 y.o. with lymphoma and adenocarcinoma of the lung. HPI:  The patient is here today for follow-up regarding his history of non-small cell lung cancer and a history of non-Hodgkin's lymphoma. On today's evaluation he states that his general sense of wellbeing has been fairly stable. The patient continues to complain of generalized weakness and fatigue. However, these overall have been stable and relatively modest in nature. He does not have any specific signs or symptoms that be suggestive recurrence of malignancy. On November 24, 2021 the patient had follow-up CT scans of the chest abdomen pelvis completed. These all appeared to be stable. He is noted to be status post partial right pneumonectomy and has no evidence of residual neoplasm within the lung. There is no evidence of lymphadenopathy or lymphoma in the chest, abdomen, or pelvis. The results of the scans were reviewed with the patient today. He has not had fever, cough, shortness of breath or other signs of infection. The patient's bowel and bladder habits have been normal.  He has not seen blood in his stool or urine. The patient remains active with an ECOG performance status of level 0. Laboratory studies from today were reviewed with the patient. The patient has mild anemia. He does not report any evidence of abnormal bleeding or blood loss. His laboratory studies will continue to be monitored. PMH, SH, and FH:  I reviewed the patients medication list and allergy list as noted on the electronic medical record. The PMH, SH and FH were also reviewed as noted on the EMR. Review of Systems  Constitutional: Fatigue.     HENT: Negative. Eyes: Negative. Respiratory: Negative. Cardiovascular: Negative. Gastrointestinal: Negative. Genitourinary: Negative. Musculoskeletal: Negative. Skin: Negative. Neurological: General weakness. Hematological: Negative. Psychiatric/Behavioral: Negative. Objective:   Physical Exam  Vitals:    12/01/21 1125   BP: 134/87   Pulse: 87   Resp: 20   Temp: 97.9 °F (36.6 °C)   SpO2: 95%   Vitals reviewed and are stable. Constitutional: Well-developed. No acute distress. HENT: Normocephalic and atraumatic. Eyes: Pupils appear equal and reactive. Neck: Overall appearance is symmetrical. No identifiable masses. Pulmonary: Effort normal. No respiratory distress. .  Neurological: Alert and oriented to person, place, and time. Judgment and thought content normal.  Skin: Skin is warm and dry. No rash. Psychiatric: Mood and affect appropriate for the clinical situation. Data Analysis:    Hematology 6/2/2021 2/2/2021 1/6/2021   WBC 8.5 5.6 6.8   RBC 4.52 (L) 3.60 (L) 3.46 (L)   HGB 13.6 (L) 11.6 (L) 11.0 (L)   HCT 40.8 (L) 35.3 (L) 34.0 (L)   MCV 90 98 (H) 98.3 (H)   RDW 13.3 14.7 (H)     181 176     Assessment:   1. Follicular lymphoma stage III. 2.  Adenocarcinoma of the lung. 3.  Chronic anemia    Plan:   1. Monitor for recurrence of lymphoma. 2.  Monitor for recurrence of non-small cell lung cancer. 3.  Monitor hemoglobin/hematocrit and for any signs of blood loss. Multiple questions were answered throughout the course the consultation. By the end of the consultation, all the patient's questions had been answered. The patient was asked to call if there were any questions or concerns prior to the next scheduled clinic visit. Nicole Cha M.D.                                                                          Medical Director: [facility]  [facility] [address] [place] [address], [address] [place] [address], [address] [place] [address], [address] [facility] [address] of the [facility] Alison MORATAYA at the [facility]      **This report has been created using voice recognition software. It may contain minor errors which are inherent in voice recognition technology. **

## 2022-06-01 ENCOUNTER — OFFICE VISIT (OUTPATIENT)
Dept: ONCOLOGY | Age: 62
End: 2022-06-01
Payer: COMMERCIAL

## 2022-06-01 ENCOUNTER — HOSPITAL ENCOUNTER (OUTPATIENT)
Dept: INFUSION THERAPY | Age: 62
Discharge: HOME | End: 2022-06-01
Payer: COMMERCIAL

## 2022-06-01 VITALS
DIASTOLIC BLOOD PRESSURE: 88 MMHG | HEIGHT: 72 IN | TEMPERATURE: 98.7 F | OXYGEN SATURATION: 96 % | HEART RATE: 84 BPM | BODY MASS INDEX: 41.72 KG/M2 | WEIGHT: 308 LBS | SYSTOLIC BLOOD PRESSURE: 139 MMHG | RESPIRATION RATE: 18 BRPM

## 2022-06-01 DIAGNOSIS — C34.91 ADENOCARCINOMA, LUNG, RIGHT (HCC): ICD-10-CM

## 2022-06-01 DIAGNOSIS — D64.9 CHRONIC ANEMIA: ICD-10-CM

## 2022-06-01 DIAGNOSIS — C34.91 ADENOCARCINOMA, LUNG, RIGHT (HCC): Primary | ICD-10-CM

## 2022-06-01 DIAGNOSIS — C82.58 DIFFUSE FOLLICLE CENTER LYMPHOMA OF LYMPH NODES OF MULTIPLE REGIONS (HCC): ICD-10-CM

## 2022-06-01 LAB
ABSOLUTE IMMATURE GRANULOCYTE: 0.01 THOU/MM3 (ref 0–0.07)
ALBUMIN SERPL-MCNC: 4.2 G/DL (ref 3.5–5.1)
ALP BLD-CCNC: 73 U/L (ref 38–126)
ALT SERPL-CCNC: 16 U/L (ref 11–66)
AST SERPL-CCNC: 27 U/L (ref 5–40)
BASINOPHIL, AUTOMATED: 1 % (ref 0–3)
BASOPHILS ABSOLUTE: 0 THOU/MM3 (ref 0–0.1)
BILIRUB SERPL-MCNC: 1 MG/DL (ref 0.3–1.2)
BILIRUBIN DIRECT: < 0.2 MG/DL (ref 0–0.3)
BUN, WHOLE BLOOD: 19 MG/DL (ref 8–26)
CHLORIDE, WHOLE BLOOD: 102 MEQ/L (ref 98–109)
CREATININE, WHOLE BLOOD: 1.4 MG/DL (ref 0.5–1.2)
EOSINOPHILS ABSOLUTE: 0.4 THOU/MM3 (ref 0–0.4)
EOSINOPHILS RELATIVE PERCENT: 5 % (ref 0–4)
GFR, ESTIMATED ,CON: 54 ML/MIN/1.73M2
GLUCOSE, WHOLE BLOOD: 204 MG/DL (ref 70–108)
HCT VFR BLD CALC: 42 % (ref 42–52)
HEMOGLOBIN: 13.6 GM/DL (ref 14–18)
IMMATURE GRANULOCYTES: 0 %
IONIZED CALCIUM, WHOLE BLOOD: 1.19 MMOL/L (ref 1.12–1.32)
LYMPHOCYTES # BLD: 19 % (ref 15–47)
LYMPHOCYTES ABSOLUTE: 1.4 THOU/MM3 (ref 1–4.8)
MCH RBC QN AUTO: 29.1 PG (ref 26–33)
MCHC RBC AUTO-ENTMCNC: 32.4 GM/DL (ref 32.2–35.5)
MCV RBC AUTO: 90 FL (ref 80–94)
MONOCYTES ABSOLUTE: 0.4 THOU/MM3 (ref 0.4–1.3)
MONOCYTES: 6 % (ref 0–12)
PDW BLD-RTO: 12.9 % (ref 11.5–14.5)
PLATELET # BLD: 198 THOU/MM3 (ref 130–400)
PMV BLD AUTO: 9.5 FL (ref 9.4–12.4)
POTASSIUM, WHOLE BLOOD: 4.1 MEQ/L (ref 3.5–4.9)
RBC # BLD: 4.68 MILL/MM3 (ref 4.7–6.1)
SEG NEUTROPHILS: 68 % (ref 43–75)
SEGMENTED NEUTROPHILS ABSOLUTE COUNT: 4.9 THOU/MM3 (ref 1.8–7.7)
SODIUM, WHOLE BLOOD: 138 MEQ/L (ref 138–146)
TOTAL CO2, WHOLE BLOOD: 26 MEQ/L (ref 23–33)
TOTAL PROTEIN: 6.7 G/DL (ref 6.1–8)
WBC # BLD: 7.1 THOU/MM3 (ref 4.8–10.8)

## 2022-06-01 PROCEDURE — G8427 DOCREV CUR MEDS BY ELIG CLIN: HCPCS | Performed by: INTERNAL MEDICINE

## 2022-06-01 PROCEDURE — 1036F TOBACCO NON-USER: CPT | Performed by: INTERNAL MEDICINE

## 2022-06-01 PROCEDURE — 80076 HEPATIC FUNCTION PANEL: CPT

## 2022-06-01 PROCEDURE — 99211 OFF/OP EST MAY X REQ PHY/QHP: CPT

## 2022-06-01 PROCEDURE — 3017F COLORECTAL CA SCREEN DOC REV: CPT | Performed by: INTERNAL MEDICINE

## 2022-06-01 PROCEDURE — G8417 CALC BMI ABV UP PARAM F/U: HCPCS | Performed by: INTERNAL MEDICINE

## 2022-06-01 PROCEDURE — 99214 OFFICE O/P EST MOD 30 MIN: CPT | Performed by: INTERNAL MEDICINE

## 2022-06-01 PROCEDURE — 80047 BASIC METABLC PNL IONIZED CA: CPT

## 2022-06-01 PROCEDURE — 85025 COMPLETE CBC W/AUTO DIFF WBC: CPT

## 2022-06-01 RX ORDER — TADALAFIL 5 MG/1
5 TABLET ORAL DAILY
COMMUNITY
Start: 2022-04-21

## 2022-06-16 NOTE — PROGRESS NOTES
[facility]ENTER  CANCER NETWORK OF [facility]  [facility] [address] [facility] [address]  [address] [facility] [address]  Dept: [phone]  Dept Fax: [phone]  Loc: [phone]     Subjective:      Chief Complaint:  Nabil Davalos is a 59 y.o. with lymphoma and adenocarcinoma of the lung. HPI:  Mr. Thomas Barnes is here today for follow-up regarding history of non-Hodgkin's lymphoma as well as history of non-small cell lung cancer. On today's evaluation the patient states that he feels well and has no specific complaints. He does continue to complain of generalized weakness and fatigue. The patient does not report any specific symptoms that be suggestive of recurrence of his malignancy. He denies hot flashes, night sweats, unintentional weight loss, or unexplained fever. The patient denies cough, shortness of breath, fever or other signs of infection. He continues to have mild anemia but no evidence of blood loss. His bowel and bladder habits have remained stable. The patient has not seen blood in his stool or urine. Despite his weakness and fatigue he remains active with an ECOG performance status of level 0. PMH, SH, and FH:  I reviewed the patients medication list and allergy list as noted on the electronic medical record. The PMH, SH and FH were also reviewed as noted on the EMR. Review of Systems  Constitutional: Fatgiue. HENT: Negative. Eyes: Negative. Respiratory: Negative. Cardiovascular: Negative. Gastrointestinal: Negative. Genitourinary: Negative. Musculoskeletal: Negative. Skin: Negative. Neurological: Weakness. Hematological: Negative. Psychiatric/Behavioral: Negative. Objective:   Physical Exam  Vitals:    06/01/22 1020   BP: 139/88   Pulse: 84   Resp: 18   Temp: 98.7 °F (37.1 °C)   SpO2: 96%   Vitals reviewed and are stable. Constitutional: Well-developed.  No acute distress. HENT: Normocephalic and atraumatic. Eyes: Pupils appear equal and reactive. Neck: Overall appearance is symmetrical. No identifiable masses. Pulmonary: Effort normal. No respiratory distress. .  Neurological: Alert and oriented to person, place, and time. Judgment and thought content normal.  Skin: Skin is warm and dry. No rash. Psychiatric: Mood and affect appropriate for the clinical situation. Data Analysis: The following laboratory studies were reviewed with the patient today:    Hematology 6/1/2022 12/1/2021 6/2/2021   WBC 7.1 7.9 8.5   RBC 4.68 (L) 4.61 (L) 4.52 (L)   HGB 13.6 (L) 13.7 (L) 13.6 (L)   HCT 42.0 42.0 40.8 (L)   MCV 90 91 90   RDW 12.9 13.3 13.3    222 218     Assessment:   1. Follicular lymphoma stage III. 2.  Adenocarcinoma of the lung. 3.  Chronic anemia    Plan:   1. Monitor for recurrence of lymphoma. 2.  Monitor for recurrence of non-small cell lung cancer. 3.  Monitor hemoglobin/hematocrit and for any signs of blood loss. 4.  CT scans prior to return to clinic. Justyn Cho M.D. Medical Director: [facility]  Cancer [address], [address], [address] [place] [address], [address] [place] [address] of the [facility] at the [facility]      **This report has been created using voice recognition software. It may contain minor errors which are inherent in voice recognition technology. **

## 2022-09-19 NOTE — PROGRESS NOTES
magnesium. Allergies  No Known Allergies    Social History   reports that he has never smoked. He has never used smokeless tobacco. He reports that he does not currently use alcohol. He reports current drug use. Drug: Marijuana Anat Thomas). Family History  family history includes Diabetes in his brother and sister; Heart Disease in his father; No Known Problems in his mother; Stroke in his father. Labs: Reviewed    Physical Exam  Vitals:    09/20/22 0957   BP: (!) 145/82   Pulse: 80   Resp: 20   Temp: 97.9 °F (36.6 °C)   SpO2: 95%      General: Non-ill appearing. HEENT: NC/AT,nonicteric, perrla,eom intact, no mucosal lesions  Neck: normal thyroid, no masses  Nodes: No adenopathy  Lungs/chest: clear, no rales,rhonchi or wheezing, lung bases clear  CV: rrr, no rubs ,gallops or murmurs  Abdomen: soft, non-tender,bowel sounds normal , no palpable hepatosplenomegaly  Back: normal curvature, No midline tenderness. flanks nontender  : Not Examined  Extremities: no cyanosis,clubbing or edema. Skin: unremarkable  Neuro: A and O x 4, CN exam nonfocal, Motor- no deficits, Sensory- no deficits, gait-nl, speech- fluent, no ataxia. Assessment/Plan:   1. Follicular lymphoma stage III. (Bx 10/15/2019) S/p Bendamustine and Rituxan  2. Lung Adenocarcinoma s/p right upper and right middle lobe bilobed lobectomy 9/30/2020 Path (+)Visceropleural invasion s/p adjuvant chemotherapy treatment with cisplatin and Alimta. 3.  Chronic anemia    Given the symptomatology recommend proceeding with CT chest abdomen pelvis with contrast.  We will also obtain labs including CBC CMP LDH today return to clinic 2 weeks to discuss results and management    Nino Ormond, MD  9/20/2022      **This report has been created using voice recognition software. It may contain minor errors which are inherent in voice recognition technology. **

## 2022-09-20 ENCOUNTER — OFFICE VISIT (OUTPATIENT)
Dept: ONCOLOGY | Age: 62
End: 2022-09-20
Payer: COMMERCIAL

## 2022-09-20 ENCOUNTER — HOSPITAL ENCOUNTER (OUTPATIENT)
Dept: INFUSION THERAPY | Age: 62
Discharge: HOME | End: 2022-09-20
Payer: COMMERCIAL

## 2022-09-20 VITALS
HEIGHT: 72 IN | TEMPERATURE: 97.9 F | BODY MASS INDEX: 41.55 KG/M2 | RESPIRATION RATE: 20 BRPM | SYSTOLIC BLOOD PRESSURE: 145 MMHG | WEIGHT: 306.8 LBS | DIASTOLIC BLOOD PRESSURE: 82 MMHG | HEART RATE: 80 BPM | OXYGEN SATURATION: 95 %

## 2022-09-20 VITALS
WEIGHT: 306.8 LBS | BODY MASS INDEX: 41.55 KG/M2 | RESPIRATION RATE: 20 BRPM | HEIGHT: 72 IN | TEMPERATURE: 97.9 F | OXYGEN SATURATION: 95 % | DIASTOLIC BLOOD PRESSURE: 82 MMHG | SYSTOLIC BLOOD PRESSURE: 145 MMHG | HEART RATE: 80 BPM

## 2022-09-20 DIAGNOSIS — C34.91 ADENOCARCINOMA, LUNG, RIGHT (HCC): ICD-10-CM

## 2022-09-20 DIAGNOSIS — C85.90 NON-HODGKIN'S LYMPHOMA, UNSPECIFIED BODY REGION, UNSPECIFIED NON-HODGKIN LYMPHOMA TYPE (HCC): ICD-10-CM

## 2022-09-20 DIAGNOSIS — C34.91 ADENOCARCINOMA, LUNG, RIGHT (HCC): Primary | ICD-10-CM

## 2022-09-20 LAB
ABSOLUTE IMMATURE GRANULOCYTE: 0.01 THOU/MM3 (ref 0–0.07)
ALBUMIN SERPL-MCNC: 4.2 G/DL (ref 3.5–5.1)
ALP BLD-CCNC: 78 U/L (ref 38–126)
ALT SERPL-CCNC: 21 U/L (ref 11–66)
AST SERPL-CCNC: 35 U/L (ref 5–40)
BASINOPHIL, AUTOMATED: 1 % (ref 0–3)
BASOPHILS ABSOLUTE: 0 THOU/MM3 (ref 0–0.1)
BILIRUB SERPL-MCNC: 0.5 MG/DL (ref 0.3–1.2)
BILIRUBIN DIRECT: < 0.2 MG/DL (ref 0–0.3)
BUN, WHOLE BLOOD: 13 MG/DL (ref 8–26)
CHLORIDE, WHOLE BLOOD: 102 MEQ/L (ref 98–109)
CREATININE, WHOLE BLOOD: 1.3 MG/DL (ref 0.5–1.2)
EOSINOPHILS ABSOLUTE: 0.3 THOU/MM3 (ref 0–0.4)
EOSINOPHILS RELATIVE PERCENT: 6 % (ref 0–4)
GFR, ESTIMATED ,CON: 59 ML/MIN/1.73M2
GLUCOSE, WHOLE BLOOD: 189 MG/DL (ref 70–108)
HCT VFR BLD CALC: 43.3 % (ref 42–52)
HEMOGLOBIN: 14.4 GM/DL (ref 14–18)
IMMATURE GRANULOCYTES: 0 %
IONIZED CALCIUM, WHOLE BLOOD: 1.11 MMOL/L (ref 1.12–1.32)
LD: 197 U/L (ref 100–190)
LYMPHOCYTES # BLD: 26 % (ref 15–47)
LYMPHOCYTES ABSOLUTE: 1 THOU/MM3 (ref 1–4.8)
MCH RBC QN AUTO: 29.3 PG (ref 26–33)
MCHC RBC AUTO-ENTMCNC: 33.3 GM/DL (ref 32.2–35.5)
MCV RBC AUTO: 88 FL (ref 80–94)
MONOCYTES ABSOLUTE: 0.4 THOU/MM3 (ref 0.4–1.3)
MONOCYTES: 9 % (ref 0–12)
PDW BLD-RTO: 13.9 % (ref 11.5–14.5)
PLATELET # BLD: 157 THOU/MM3 (ref 130–400)
PMV BLD AUTO: 10 FL (ref 9.4–12.4)
POTASSIUM, WHOLE BLOOD: 4.6 MEQ/L (ref 3.5–4.9)
RBC # BLD: 4.91 MILL/MM3 (ref 4.7–6.1)
SEG NEUTROPHILS: 58 % (ref 43–75)
SEGMENTED NEUTROPHILS ABSOLUTE COUNT: 2.3 THOU/MM3 (ref 1.8–7.7)
SODIUM, WHOLE BLOOD: 137 MEQ/L (ref 138–146)
TOTAL CO2, WHOLE BLOOD: 26 MEQ/L (ref 23–33)
TOTAL PROTEIN: 6.9 G/DL (ref 6.1–8)
WBC # BLD: 4 THOU/MM3 (ref 4.8–10.8)

## 2022-09-20 PROCEDURE — 99211 OFF/OP EST MAY X REQ PHY/QHP: CPT

## 2022-09-20 PROCEDURE — G8427 DOCREV CUR MEDS BY ELIG CLIN: HCPCS | Performed by: INTERNAL MEDICINE

## 2022-09-20 PROCEDURE — 80047 BASIC METABLC PNL IONIZED CA: CPT

## 2022-09-20 PROCEDURE — 1036F TOBACCO NON-USER: CPT | Performed by: INTERNAL MEDICINE

## 2022-09-20 PROCEDURE — 80076 HEPATIC FUNCTION PANEL: CPT

## 2022-09-20 PROCEDURE — 85025 COMPLETE CBC W/AUTO DIFF WBC: CPT

## 2022-09-20 PROCEDURE — G8417 CALC BMI ABV UP PARAM F/U: HCPCS | Performed by: INTERNAL MEDICINE

## 2022-09-20 PROCEDURE — 83615 LACTATE (LD) (LDH) ENZYME: CPT

## 2022-09-20 PROCEDURE — 3017F COLORECTAL CA SCREEN DOC REV: CPT | Performed by: INTERNAL MEDICINE

## 2022-09-20 PROCEDURE — 99214 OFFICE O/P EST MOD 30 MIN: CPT | Performed by: INTERNAL MEDICINE

## 2022-09-20 NOTE — PATIENT INSTRUCTIONS
CT chest abd pelvis  Give 1lt NS before scans due to mild CKD  Labs today CBC CMP LDH spep/sflc  Return to clinic 2 weeks to discuss results

## 2022-10-24 DIAGNOSIS — C34.91 ADENOCARCINOMA, LUNG, RIGHT (HCC): Primary | ICD-10-CM

## 2022-11-29 ENCOUNTER — HOSPITAL ENCOUNTER (OUTPATIENT)
Dept: INFUSION THERAPY | Age: 62
Discharge: HOME | End: 2022-11-29
Payer: COMMERCIAL

## 2022-11-29 ENCOUNTER — OFFICE VISIT (OUTPATIENT)
Dept: ONCOLOGY | Age: 62
End: 2022-11-29
Payer: COMMERCIAL

## 2022-11-29 VITALS
BODY MASS INDEX: 42.66 KG/M2 | TEMPERATURE: 97 F | RESPIRATION RATE: 20 BRPM | WEIGHT: 315 LBS | SYSTOLIC BLOOD PRESSURE: 150 MMHG | OXYGEN SATURATION: 93 % | HEART RATE: 92 BPM | HEIGHT: 72 IN | DIASTOLIC BLOOD PRESSURE: 88 MMHG

## 2022-11-29 VITALS
DIASTOLIC BLOOD PRESSURE: 88 MMHG | RESPIRATION RATE: 20 BRPM | TEMPERATURE: 97 F | HEIGHT: 72 IN | OXYGEN SATURATION: 93 % | SYSTOLIC BLOOD PRESSURE: 150 MMHG | HEART RATE: 92 BPM | BODY MASS INDEX: 42.66 KG/M2 | WEIGHT: 315 LBS

## 2022-11-29 DIAGNOSIS — C82.90 FOLLICULAR LYMPHOMA, UNSPECIFIED FOLLICULAR LYMPHOMA TYPE, UNSPECIFIED BODY REGION (HCC): ICD-10-CM

## 2022-11-29 DIAGNOSIS — C34.90 MALIGNANT NEOPLASM OF LUNG, UNSPECIFIED LATERALITY, UNSPECIFIED PART OF LUNG (HCC): Primary | ICD-10-CM

## 2022-11-29 PROCEDURE — 1036F TOBACCO NON-USER: CPT | Performed by: INTERNAL MEDICINE

## 2022-11-29 PROCEDURE — 99211 OFF/OP EST MAY X REQ PHY/QHP: CPT

## 2022-11-29 PROCEDURE — G8427 DOCREV CUR MEDS BY ELIG CLIN: HCPCS | Performed by: INTERNAL MEDICINE

## 2022-11-29 PROCEDURE — G8417 CALC BMI ABV UP PARAM F/U: HCPCS | Performed by: INTERNAL MEDICINE

## 2022-11-29 PROCEDURE — G8484 FLU IMMUNIZE NO ADMIN: HCPCS | Performed by: INTERNAL MEDICINE

## 2022-11-29 PROCEDURE — 3017F COLORECTAL CA SCREEN DOC REV: CPT | Performed by: INTERNAL MEDICINE

## 2022-11-29 PROCEDURE — 99214 OFFICE O/P EST MOD 30 MIN: CPT | Performed by: INTERNAL MEDICINE

## 2022-11-29 RX ORDER — PROCHLORPERAZINE MALEATE 10 MG
10 TABLET ORAL EVERY 6 HOURS PRN
Qty: 30 TABLET | Refills: 1 | Status: SHIPPED | OUTPATIENT
Start: 2022-11-29

## 2022-11-29 NOTE — PROGRESS NOTES
[facility]CER CENTER  CANCER NETWORK OF [facility]  [facility] [address] [facility] 98  [address], [facility] [address]  Dept: [phone]  Dept Fax: [phone]: [phone]     Encounter Date: 11/29/2022    Primary Provider: Aureliano Hartman MD, MD     HPI:  Brenda Galvez is a very pleasant 57 y.o. male seen in continued follow-up of non-Hodgkin's lymphoma, non-small cell lung cancer status post right pneumonectomy. He was last evaluated by Dr. Dr Rc Lance 6/15/2022, at which time observation was suggested. Last CT chest abdomen pelvis was performed 11/30/2021 without evidence of progression    CT Chest abd pelvis 11/15/22 without evidence of disease    Patient reports that he has had progressive nausea, abdominal discomfort. He reports weight loss but cannot quantify. He also endorses progressive fatigue    His ECOG performance status is level 0-1. Review of Systems  Constitutional: Negative. HENT: Negative. Eyes: Negative. Respiratory: Negative. Cardiovascular: Negative. Gastrointestinal: Negative. Genitourinary: Negative. Musculoskeletal: Negative. Skin: Negative. Neurological: Negative. Hematological: Negative. Psychiatric/Behavioral: Negative. Past Medical History   has a past medical history of Acid reflux, CAD (coronary artery disease), Cancer ([address] Ejection fraction < 50%, Non Hodgkin's lymphoma ([address] Osteoarthritis, Tooth ache, and Type 2 diabetes mellitus without complication ([address] Surgical History   has a past surgical history that includes Colonoscopy; Dilatation, esophagus; Tunneled venous port placement; CT NEEDLE BIOPSY LUNG PERCUTANEOUS (8/4/2020); lymph node biopsy; Lung removal, total (Right, 9/30/2020); and Dental surgery (04/2022).     Home Medications  has a current medication list which includes the following prescription(s): metformin, aspirin, tadalafil, magnesium oxide, [DISCONTINUED] folic acid, and [DISCONTINUED] magnesium. Allergies  No Known Allergies    Social History   reports that he has never smoked. He has never used smokeless tobacco. He reports that he does not currently use alcohol. He reports current drug use. Drug: Marijuana Laveda Moraga). Family History  family history includes Diabetes in his brother and sister; Heart Disease in his father; No Known Problems in his mother; Stroke in his father. Labs: Reviewed    Physical Exam  Vitals:    11/29/22 0805   BP: (!) 150/88   Pulse: 92   Resp: 20   Temp: 97 °F (36.1 °C)   SpO2: 93%      General: Non-ill appearing. HEENT: NC/AT,nonicteric, perrla,eom intact, no mucosal lesions  Neck: normal thyroid, no masses  Nodes: No adenopathy  Lungs/chest: clear, no rales,rhonchi or wheezing, lung bases clear  CV: rrr, no rubs ,gallops or murmurs  Abdomen: soft, non-tender,bowel sounds normal , no palpable hepatosplenomegaly  Back: normal curvature, No midline tenderness. flanks nontender  : Not Examined  Extremities: no cyanosis,clubbing or edema. Skin: unremarkable  Neuro: A and O x 4, CN exam nonfocal, Motor- no deficits, Sensory- no deficits, gait-nl, speech- fluent, no ataxia. Assessment/Plan:   1. Follicular lymphoma stage III. (Bx 10/15/2019) S/p Bendamustine and Rituxan  2. Lung Adenocarcinoma s/p right upper and right middle lobe bilobed lobectomy 9/30/2020 Path (+)Visceropleural invasion s/p adjuvant chemotherapy treatment with cisplatin and Alimta. 3.  Chronic anemia    Scans unremarkable - results discussed  Nexium for indigestion, compazine prn for nausea. He has h/o hiatal hernia with Schatskys rings  RTC  6 months with labs / scans     Magda Alexander MD  11/29/2022      **This report has been created using voice recognition software. It may contain minor errors which are inherent in voice recognition technology. **

## 2022-12-05 ENCOUNTER — TELEPHONE (OUTPATIENT)
Dept: ONCOLOGY | Age: 62
End: 2022-12-05

## 2022-12-05 NOTE — TELEPHONE ENCOUNTER
Patient currently going through PT through [address] the Physical Therapist there said she is currently doing exercises with him and it's not helping his pain. She wants to know if she can try ultrasound or dry needling, but wants your opinion due to him having a history of right lung cancer. Please advise.     Kirill Diaz [phone]

## 2023-01-30 RX ORDER — PROCHLORPERAZINE MALEATE 10 MG
10 TABLET ORAL EVERY 6 HOURS PRN
Qty: 30 TABLET | Refills: 1 | Status: SHIPPED | OUTPATIENT
Start: 2023-01-30

## 2023-03-07 RX ORDER — PROCHLORPERAZINE MALEATE 10 MG
10 TABLET ORAL EVERY 6 HOURS PRN
Qty: 30 TABLET | Refills: 1 | Status: SHIPPED | OUTPATIENT
Start: 2023-03-07

## 2023-05-15 DIAGNOSIS — C82.90 FOLLICULAR LYMPHOMA, UNSPECIFIED FOLLICULAR LYMPHOMA TYPE, UNSPECIFIED BODY REGION (HCC): ICD-10-CM

## 2023-05-15 DIAGNOSIS — C34.90 MALIGNANT NEOPLASM OF LUNG, UNSPECIFIED LATERALITY, UNSPECIFIED PART OF LUNG (HCC): Primary | ICD-10-CM

## 2023-05-30 ENCOUNTER — OFFICE VISIT (OUTPATIENT)
Dept: ONCOLOGY | Age: 63
End: 2023-05-30
Payer: COMMERCIAL

## 2023-05-30 ENCOUNTER — HOSPITAL ENCOUNTER (OUTPATIENT)
Dept: INFUSION THERAPY | Age: 63
Discharge: HOME | End: 2023-05-30
Payer: COMMERCIAL

## 2023-05-30 VITALS
DIASTOLIC BLOOD PRESSURE: 93 MMHG | OXYGEN SATURATION: 96 % | HEIGHT: 72 IN | WEIGHT: 288.8 LBS | SYSTOLIC BLOOD PRESSURE: 172 MMHG | BODY MASS INDEX: 39.12 KG/M2 | RESPIRATION RATE: 20 BRPM | HEART RATE: 84 BPM | TEMPERATURE: 98.4 F

## 2023-05-30 VITALS
HEIGHT: 72 IN | TEMPERATURE: 98.4 F | RESPIRATION RATE: 20 BRPM | WEIGHT: 288.8 LBS | BODY MASS INDEX: 39.12 KG/M2 | SYSTOLIC BLOOD PRESSURE: 172 MMHG | OXYGEN SATURATION: 96 % | HEART RATE: 84 BPM | DIASTOLIC BLOOD PRESSURE: 93 MMHG

## 2023-05-30 DIAGNOSIS — C34.90 MALIGNANT NEOPLASM OF LUNG, UNSPECIFIED LATERALITY, UNSPECIFIED PART OF LUNG (HCC): ICD-10-CM

## 2023-05-30 DIAGNOSIS — C82.90 FOLLICULAR LYMPHOMA, UNSPECIFIED FOLLICULAR LYMPHOMA TYPE, UNSPECIFIED BODY REGION (HCC): Primary | ICD-10-CM

## 2023-05-30 DIAGNOSIS — C82.90 FOLLICULAR LYMPHOMA, UNSPECIFIED FOLLICULAR LYMPHOMA TYPE, UNSPECIFIED BODY REGION (HCC): ICD-10-CM

## 2023-05-30 LAB
ALBUMIN SERPL BCG-MCNC: 4.3 G/DL (ref 3.5–5.1)
ALP SERPL-CCNC: 65 U/L (ref 38–126)
ALT SERPL W/O P-5'-P-CCNC: 23 U/L (ref 11–66)
ANION GAP SERPL CALC-SCNC: 14 MEQ/L (ref 8–16)
AST SERPL-CCNC: 30 U/L (ref 5–40)
BASOPHILS # BLD AUTO: 0 THOU/MM3 (ref 0–0.1)
BASOPHILS NFR BLD AUTO: 1 % (ref 0–3)
BILIRUB SERPL-MCNC: 0.8 MG/DL (ref 0.3–1.2)
BUN SERPL-MCNC: 18 MG/DL (ref 7–22)
CALCIUM SERPL-MCNC: 9.7 MG/DL (ref 8.5–10.5)
CHLORIDE SERPL-SCNC: 101 MEQ/L (ref 98–111)
CO2 SERPL-SCNC: 25 MEQ/L (ref 23–33)
CREAT SERPL-MCNC: 1.3 MG/DL (ref 0.4–1.2)
EOSINOPHIL # BLD AUTO: 0.5 THOU/MM3 (ref 0–0.4)
EOSINOPHIL NFR BLD AUTO: 6 % (ref 0–4)
ERYTHROCYTE [DISTWIDTH] IN BLOOD BY AUTOMATED COUNT: 13.9 % (ref 11.5–14.5)
GFR SERPL CREATININE-BSD FRML MDRD: > 60 ML/MIN/1.73M2
GLUCOSE SERPL-MCNC: 152 MG/DL (ref 70–108)
HCT VFR BLD AUTO: 45.2 % (ref 42–52)
HGB BLD-MCNC: 14.6 GM/DL (ref 14–18)
IMM GRANULOCYTES # BLD AUTO: 0.01 THOU/MM3 (ref 0–0.07)
IMM GRANULOCYTES NFR BLD AUTO: 0 %
LYMPHOCYTES # BLD AUTO: 1.9 THOU/MM3 (ref 1–4.8)
LYMPHOCYTES NFR BLD AUTO: 26 % (ref 15–47)
MCH RBC QN AUTO: 29.7 PG (ref 26–33)
MCHC RBC AUTO-ENTMCNC: 32.3 GM/DL (ref 32.2–35.5)
MCV RBC AUTO: 92 FL (ref 80–94)
MONOCYTES # BLD AUTO: 0.5 THOU/MM3 (ref 0.4–1.3)
MONOCYTES NFR BLD AUTO: 7 % (ref 0–12)
NEUTROPHILS NFR BLD AUTO: 60 % (ref 43–75)
PLATELET # BLD AUTO: 215 THOU/MM3 (ref 130–400)
PMV BLD AUTO: 10 FL (ref 9.4–12.4)
POTASSIUM SERPL-SCNC: 4.6 MEQ/L (ref 3.5–5.2)
PROT SERPL-MCNC: 6.9 G/DL (ref 6.1–8)
RBC # BLD AUTO: 4.91 MILL/MM3 (ref 4.7–6.1)
SEGMENTED NEUTROPHILS ABSOLUTE COUNT: 4.3 THOU/MM3 (ref 1.8–7.7)
SODIUM SERPL-SCNC: 140 MEQ/L (ref 135–145)
WBC # BLD AUTO: 7.1 THOU/MM3 (ref 4.8–10.8)

## 2023-05-30 PROCEDURE — 80053 COMPREHEN METABOLIC PANEL: CPT

## 2023-05-30 PROCEDURE — 99214 OFFICE O/P EST MOD 30 MIN: CPT | Performed by: INTERNAL MEDICINE

## 2023-05-30 PROCEDURE — 3017F COLORECTAL CA SCREEN DOC REV: CPT | Performed by: INTERNAL MEDICINE

## 2023-05-30 PROCEDURE — G8427 DOCREV CUR MEDS BY ELIG CLIN: HCPCS | Performed by: INTERNAL MEDICINE

## 2023-05-30 PROCEDURE — 1036F TOBACCO NON-USER: CPT | Performed by: INTERNAL MEDICINE

## 2023-05-30 PROCEDURE — 99211 OFF/OP EST MAY X REQ PHY/QHP: CPT

## 2023-05-30 PROCEDURE — G8417 CALC BMI ABV UP PARAM F/U: HCPCS | Performed by: INTERNAL MEDICINE

## 2023-05-30 PROCEDURE — 85025 COMPLETE CBC W/AUTO DIFF WBC: CPT

## 2023-05-30 ASSESSMENT — ENCOUNTER SYMPTOMS
CONSTIPATION: 0
WHEEZING: 0
CHOKING: 0
EYE PAIN: 0
RECTAL PAIN: 0
TROUBLE SWALLOWING: 0
APNEA: 0
BACK PAIN: 0
CHEST TIGHTNESS: 0
DIARRHEA: 0
ANAL BLEEDING: 0
VOMITING: 0
BLOOD IN STOOL: 0
COLOR CHANGE: 0
FACIAL SWELLING: 0
STRIDOR: 0
NAUSEA: 1
SHORTNESS OF BREATH: 0
COUGH: 0
EYE DISCHARGE: 0
ABDOMINAL PAIN: 0
SINUS PAIN: 0
ABDOMINAL DISTENTION: 0

## 2023-05-30 NOTE — PROGRESS NOTES
DILATATION, ESOPHAGUS      LUNG REMOVAL, TOTAL Right 9/30/2020    BRONCHOSCOPY, EPIDURAL CATHETER, RIGHT THORACOTOMY RIGHT UPPER LOBECTOMY performed by Brianna Corral MD at [address] [place] [address]      groin    TUNNELED VENOUS PORT PLACEMENT          Medications:  Current Outpatient Medications   Medication Sig Dispense Refill    esomeprazole (NEXIUM) 20 MG delayed release capsule Take 1 capsule by mouth daily 30 capsule 3    prochlorperazine (COMPAZINE) 10 MG tablet TAKE ONE TABLET BY MOUTH EVERY 6 HOURS AS NEEDED FOR NAUSEA 30 tablet 1    metFORMIN (GLUCOPHAGE) 1000 MG tablet Take 1 tablet by mouth daily (with breakfast)      aspirin 81 MG tablet Take 1 tablet by mouth daily      tadalafil (CIALIS) 5 MG tablet Take 5 mg by mouth daily (Patient not taking: Reported on 11/29/2022)      magnesium oxide (MAG-OX) 400 MG tablet TAKE ONE TABLET BY MOUTH DAILY (Patient not taking: Reported on 11/29/2022) 15 tablet 0     No current facility-administered medications for this visit. EXAM:   height is 6' (1.829 m) and weight is 288 lb 12.8 oz (131 kg). His oral temperature is 98.4 °F (36.9 °C). His blood pressure is 172/93 (abnormal) and his pulse is 84. His respiration is 20 and oxygen saturation is 96%. Body mass index is 39.17 kg/m². Physical Exam  Constitutional:       General: He is not in acute distress. Appearance: Normal appearance. He is obese. He is not ill-appearing, toxic-appearing or diaphoretic. HENT:      Head: Normocephalic. Right Ear: External ear normal.      Left Ear: External ear normal.      Nose: Nose normal.   Eyes:      General: No scleral icterus. Extraocular Movements: Extraocular movements intact. Conjunctiva/sclera: Conjunctivae normal.   Neck:      Vascular: No carotid bruit. Cardiovascular:      Rate and Rhythm: Normal rate and regular rhythm. Pulmonary:      Effort: No respiratory distress. Breath sounds: No stridor. No wheezing, rhonchi or rales.

## 2023-05-31 NOTE — PATIENT INSTRUCTIONS
ASSESSMENT/PLAN:    1: Diagnosis:       1. Follicular lymphoma, stage III (biopsy diagnosis October 15, 2019), status post Bendamustine and Rituxan        2. Lung adenocarcinoma, status post right upper and right middle lobe bilobed lobectomy, December 30, 2020. Path positive visceral pleural invasion status post adjuvant chemotherapy treatment with cisplatin and Alimta        3.   Chronic anemia      2) Treatment goal:      Treatment plan:       Office appointment 6 months with CBC, CMP, LDH, and CT scans of chest abdomen pelvis with contrast       Please call Dr. Marilee Burns re: persistent nausea and previous hitory of GERD, esophageal stricture, and previous esophageal dilation      3) Follow Up:6 months

## 2023-11-28 DIAGNOSIS — C34.90 MALIGNANT NEOPLASM OF LUNG, UNSPECIFIED LATERALITY, UNSPECIFIED PART OF LUNG (HCC): ICD-10-CM

## 2023-11-28 DIAGNOSIS — C82.90 FOLLICULAR LYMPHOMA, UNSPECIFIED FOLLICULAR LYMPHOMA TYPE, UNSPECIFIED BODY REGION (HCC): Primary | ICD-10-CM

## 2024-02-07 ENCOUNTER — TELEPHONE (OUTPATIENT)
Dept: ONCOLOGY | Age: 64
End: 2024-02-07

## 2024-02-07 DIAGNOSIS — C82.90 FOLLICULAR LYMPHOMA, UNSPECIFIED FOLLICULAR LYMPHOMA TYPE, UNSPECIFIED BODY REGION (HCC): Primary | ICD-10-CM

## 2024-03-05 ENCOUNTER — OFFICE VISIT (OUTPATIENT)
Dept: ONCOLOGY | Age: 64
End: 2024-03-05
Payer: MEDICARE

## 2024-03-05 ENCOUNTER — HOSPITAL ENCOUNTER (OUTPATIENT)
Dept: INFUSION THERAPY | Age: 64
Discharge: HOME | End: 2024-03-05
Payer: MEDICARE

## 2024-03-05 VITALS
OXYGEN SATURATION: 96 % | RESPIRATION RATE: 16 BRPM | SYSTOLIC BLOOD PRESSURE: 135 MMHG | DIASTOLIC BLOOD PRESSURE: 81 MMHG | TEMPERATURE: 98.6 F | HEART RATE: 83 BPM

## 2024-03-05 VITALS
SYSTOLIC BLOOD PRESSURE: 135 MMHG | OXYGEN SATURATION: 96 % | RESPIRATION RATE: 16 BRPM | DIASTOLIC BLOOD PRESSURE: 81 MMHG | HEIGHT: 72 IN | BODY MASS INDEX: 36.52 KG/M2 | TEMPERATURE: 98.6 F | WEIGHT: 269.6 LBS | HEART RATE: 83 BPM

## 2024-03-05 DIAGNOSIS — C82.90 FOLLICULAR LYMPHOMA, UNSPECIFIED FOLLICULAR LYMPHOMA TYPE, UNSPECIFIED BODY REGION (HCC): Primary | ICD-10-CM

## 2024-03-05 DIAGNOSIS — C82.90 FOLLICULAR LYMPHOMA, UNSPECIFIED FOLLICULAR LYMPHOMA TYPE, UNSPECIFIED BODY REGION (HCC): ICD-10-CM

## 2024-03-05 LAB
BASOPHILS # BLD AUTO: 0 THOU/MM3 (ref 0–0.1)
BASOPHILS NFR BLD AUTO: 0 % (ref 0–3)
BUN BLDP-MCNC: 24 MG/DL (ref 8–26)
CHLORIDE BLD-SCNC: 105 MEQ/L (ref 98–109)
CREAT BLD-MCNC: 1.2 MG/DL (ref 0.5–1.2)
EOSINOPHIL # BLD AUTO: 0.4 THOU/MM3 (ref 0–0.4)
EOSINOPHIL NFR BLD AUTO: 5 % (ref 0–4)
ERYTHROCYTE [DISTWIDTH] IN BLOOD BY AUTOMATED COUNT: 14.4 % (ref 11.5–14.5)
GFR SERPL CREATININE-BSD FRML MDRD: > 60 ML/MIN/1.73M2
GLUCOSE BLD-MCNC: 113 MG/DL (ref 70–108)
HCT VFR BLD AUTO: 44.9 % (ref 42–52)
HGB BLD-MCNC: 14.6 GM/DL (ref 14–18)
IMM GRANULOCYTES # BLD AUTO: 0.01 THOU/MM3 (ref 0–0.07)
IMM GRANULOCYTES NFR BLD AUTO: 0 %
IONIZED CALCIUM, WHOLE BLOOD: 1.22 MMOL/L (ref 1.12–1.32)
LYMPHOCYTES # BLD AUTO: 2.1 THOU/MM3 (ref 1–4.8)
LYMPHOCYTES NFR BLD AUTO: 26 % (ref 15–47)
MCH RBC QN AUTO: 27.8 PG (ref 26–33)
MCHC RBC AUTO-ENTMCNC: 32.5 GM/DL (ref 32.2–35.5)
MCV RBC AUTO: 86 FL (ref 80–94)
MONOCYTES # BLD AUTO: 0.5 THOU/MM3 (ref 0.4–1.3)
MONOCYTES NFR BLD AUTO: 6 % (ref 0–12)
NEUTROPHILS NFR BLD AUTO: 63 % (ref 43–75)
PLATELET # BLD AUTO: 216 THOU/MM3 (ref 130–400)
PMV BLD AUTO: 9.7 FL (ref 9.4–12.4)
POTASSIUM BLD-SCNC: 4.7 MEQ/L (ref 3.5–4.9)
RBC # BLD AUTO: 5.25 MILL/MM3 (ref 4.7–6.1)
SEGMENTED NEUTROPHILS ABSOLUTE COUNT: 4.9 THOU/MM3 (ref 1.8–7.7)
SODIUM BLD-SCNC: 139 MEQ/L (ref 138–146)
TOTAL CO2, WHOLE BLOOD: 28 MEQ/L (ref 23–33)
WBC # BLD AUTO: 7.9 THOU/MM3 (ref 4.8–10.8)

## 2024-03-05 PROCEDURE — 80047 BASIC METABLC PNL IONIZED CA: CPT

## 2024-03-05 PROCEDURE — 85025 COMPLETE CBC W/AUTO DIFF WBC: CPT

## 2024-03-05 PROCEDURE — 80076 HEPATIC FUNCTION PANEL: CPT

## 2024-03-05 PROCEDURE — 99214 OFFICE O/P EST MOD 30 MIN: CPT | Performed by: INTERNAL MEDICINE

## 2024-03-05 PROCEDURE — 1123F ACP DISCUSS/DSCN MKR DOCD: CPT | Performed by: INTERNAL MEDICINE

## 2024-03-05 PROCEDURE — 83615 LACTATE (LD) (LDH) ENZYME: CPT

## 2024-03-05 PROCEDURE — 99211 OFF/OP EST MAY X REQ PHY/QHP: CPT

## 2024-03-05 RX ORDER — OXYBUTYNIN CHLORIDE 5 MG/1
5 TABLET ORAL 2 TIMES DAILY
COMMUNITY

## 2024-03-05 ASSESSMENT — ENCOUNTER SYMPTOMS
EYE PAIN: 0
APNEA: 0
CHOKING: 0
RECTAL PAIN: 0
BLOOD IN STOOL: 0
SINUS PAIN: 0
VOMITING: 0
ABDOMINAL DISTENTION: 0
STRIDOR: 0
DIARRHEA: 0
CHEST TIGHTNESS: 0
FACIAL SWELLING: 0
SHORTNESS OF BREATH: 0
WHEEZING: 0
CONSTIPATION: 0
COUGH: 0
ABDOMINAL PAIN: 0
ANAL BLEEDING: 0
TROUBLE SWALLOWING: 0
COLOR CHANGE: 0
BACK PAIN: 0
NAUSEA: 1
EYE DISCHARGE: 0

## 2024-03-05 NOTE — PROGRESS NOTES
03/05/2024 03:09 PM    HCT 44.9 03/05/2024 03:09 PM     03/05/2024 03:09 PM    MCV 86 03/05/2024 03:09 PM    MCH 27.8 03/05/2024 03:09 PM    MCHC 32.5 03/05/2024 03:09 PM    RDW 14.4 03/05/2024 03:09 PM    NRBC 0 01/06/2021 12:07 PM    SEGSPCT 61.8 01/06/2021 12:07 PM    MONOPCT 12.7 01/06/2021 12:07 PM    MONOSABS 0.5 03/05/2024 03:09 PM    LYMPHSABS 2.1 03/05/2024 03:09 PM    EOSABS 0.4 03/05/2024 03:09 PM    BASOSABS 0.0 03/05/2024 03:09 PM      Lab Results   Component Value Date/Time    SEGSABS 4.9 03/05/2024 03:09 PM       CMP:    Lab Results   Component Value Date/Time     03/05/2024 03:09 PM     05/30/2023 09:47 AM    K 4.7 03/05/2024 03:09 PM    K 4.6 05/30/2023 09:47 AM    K 4.1 09/30/2020 06:01 AM     05/30/2023 09:47 AM    CO2 25 05/30/2023 09:47 AM    BUN 18 05/30/2023 09:47 AM    CREATININE 1.2 03/05/2024 03:09 PM    CREATININE 1.3 05/30/2023 09:47 AM    LABGLOM >60 05/30/2023 09:47 AM    GLUCOSE 152 05/30/2023 09:47 AM    PROT 6.9 05/30/2023 09:47 AM    LABALBU 4.3 05/30/2023 09:47 AM    CALCIUM 9.7 05/30/2023 09:47 AM    BILITOT 0.8 05/30/2023 09:47 AM    ALKPHOS 65 05/30/2023 09:47 AM    AST 30 05/30/2023 09:47 AM    ALT 23 05/30/2023 09:47 AM       BMP:    Lab Results   Component Value Date/Time     03/05/2024 03:09 PM     05/30/2023 09:47 AM    K 4.7 03/05/2024 03:09 PM    K 4.6 05/30/2023 09:47 AM    K 4.1 09/30/2020 06:01 AM     05/30/2023 09:47 AM    CO2 25 05/30/2023 09:47 AM    BUN 18 05/30/2023 09:47 AM    LABALBU 4.3 05/30/2023 09:47 AM    CREATININE 1.2 03/05/2024 03:09 PM    CREATININE 1.3 05/30/2023 09:47 AM    CALCIUM 9.7 05/30/2023 09:47 AM    LABGLOM >60 05/30/2023 09:47 AM    GLUCOSE 152 05/30/2023 09:47 AM         PSA: No results found for: \"PSA\"       ASSESSMENT/PLAN:    1: Diagnosis:       1.  Follicular lymphoma, stage III (biopsy diagnosis October 15, 2019), status post Bendamustine and Rituxan        2.  Lung adenocarcinoma stage Ib,

## 2024-03-05 NOTE — PATIENT INSTRUCTIONS
CT scans negative for cancer. Recheck CT scans in 1 year.   Labs fairly normal.   MD visit in 6 months with CBC only.

## 2024-03-06 LAB
ALBUMIN SERPL BCG-MCNC: 4.3 G/DL (ref 3.5–5.1)
ALP SERPL-CCNC: 66 U/L (ref 38–126)
ALT SERPL W/O P-5'-P-CCNC: 12 U/L (ref 11–66)
AST SERPL-CCNC: 18 U/L (ref 5–40)
BILIRUB CONJ SERPL-MCNC: < 0.2 MG/DL (ref 0–0.3)
BILIRUB SERPL-MCNC: 0.7 MG/DL (ref 0.3–1.2)
LDH SERPL L TO P-CCNC: 135 U/L (ref 100–190)
PROT SERPL-MCNC: 7.1 G/DL (ref 6.1–8)

## 2024-09-03 ENCOUNTER — OFFICE VISIT (OUTPATIENT)
Dept: ONCOLOGY | Age: 64
End: 2024-09-03
Payer: MEDICARE

## 2024-09-03 ENCOUNTER — HOSPITAL ENCOUNTER (OUTPATIENT)
Dept: INFUSION THERAPY | Age: 64
Discharge: HOME | End: 2024-09-03
Payer: MEDICARE

## 2024-09-03 VITALS
RESPIRATION RATE: 16 BRPM | WEIGHT: 256 LBS | SYSTOLIC BLOOD PRESSURE: 130 MMHG | OXYGEN SATURATION: 98 % | BODY MASS INDEX: 34.67 KG/M2 | HEART RATE: 82 BPM | TEMPERATURE: 98.8 F | DIASTOLIC BLOOD PRESSURE: 77 MMHG | HEIGHT: 72 IN

## 2024-09-03 VITALS
TEMPERATURE: 98.8 F | SYSTOLIC BLOOD PRESSURE: 130 MMHG | HEART RATE: 82 BPM | DIASTOLIC BLOOD PRESSURE: 77 MMHG | OXYGEN SATURATION: 98 % | RESPIRATION RATE: 16 BRPM

## 2024-09-03 DIAGNOSIS — C82.58 DIFFUSE FOLLICLE CENTER LYMPHOMA OF LYMPH NODES OF MULTIPLE REGIONS (HCC): Primary | ICD-10-CM

## 2024-09-03 DIAGNOSIS — C82.90 FOLLICULAR LYMPHOMA, UNSPECIFIED FOLLICULAR LYMPHOMA TYPE, UNSPECIFIED BODY REGION (HCC): ICD-10-CM

## 2024-09-03 LAB
BASOPHILS # BLD AUTO: 0 THOU/MM3 (ref 0–0.1)
BASOPHILS NFR BLD AUTO: 0 % (ref 0–3)
EOSINOPHIL # BLD AUTO: 0.4 THOU/MM3 (ref 0–0.4)
EOSINOPHIL NFR BLD AUTO: 4 % (ref 0–4)
ERYTHROCYTE [DISTWIDTH] IN BLOOD BY AUTOMATED COUNT: 14.8 % (ref 11.5–14.5)
HCT VFR BLD AUTO: 44.8 % (ref 42–52)
HGB BLD-MCNC: 14.2 GM/DL (ref 14–18)
IMM GRANULOCYTES # BLD AUTO: 0.01 THOU/MM3 (ref 0–0.07)
IMM GRANULOCYTES NFR BLD AUTO: 0 %
LYMPHOCYTES # BLD AUTO: 1.8 THOU/MM3 (ref 1–4.8)
LYMPHOCYTES NFR BLD AUTO: 21 % (ref 15–47)
MCH RBC QN AUTO: 27.7 PG (ref 26–33)
MCHC RBC AUTO-ENTMCNC: 31.7 GM/DL (ref 32.2–35.5)
MCV RBC AUTO: 88 FL (ref 80–94)
MONOCYTES # BLD AUTO: 0.5 THOU/MM3 (ref 0.4–1.3)
MONOCYTES NFR BLD AUTO: 6 % (ref 0–12)
NEUTROPHILS ABSOLUTE: 5.8 THOU/MM3 (ref 1.8–7.7)
NEUTROPHILS NFR BLD AUTO: 69 % (ref 43–75)
PLATELET # BLD AUTO: 215 THOU/MM3 (ref 130–400)
PMV BLD AUTO: 9.6 FL (ref 9.4–12.4)
RBC # BLD AUTO: 5.12 MILL/MM3 (ref 4.7–6.1)
WBC # BLD AUTO: 8.4 THOU/MM3 (ref 4.8–10.8)

## 2024-09-03 PROCEDURE — 1036F TOBACCO NON-USER: CPT | Performed by: INTERNAL MEDICINE

## 2024-09-03 PROCEDURE — G8417 CALC BMI ABV UP PARAM F/U: HCPCS | Performed by: INTERNAL MEDICINE

## 2024-09-03 PROCEDURE — 85025 COMPLETE CBC W/AUTO DIFF WBC: CPT

## 2024-09-03 PROCEDURE — 1123F ACP DISCUSS/DSCN MKR DOCD: CPT | Performed by: INTERNAL MEDICINE

## 2024-09-03 PROCEDURE — 99214 OFFICE O/P EST MOD 30 MIN: CPT | Performed by: INTERNAL MEDICINE

## 2024-09-03 PROCEDURE — G8427 DOCREV CUR MEDS BY ELIG CLIN: HCPCS | Performed by: INTERNAL MEDICINE

## 2024-09-03 PROCEDURE — 3017F COLORECTAL CA SCREEN DOC REV: CPT | Performed by: INTERNAL MEDICINE

## 2024-09-03 PROCEDURE — 99211 OFF/OP EST MAY X REQ PHY/QHP: CPT

## 2024-09-03 ASSESSMENT — ENCOUNTER SYMPTOMS
ABDOMINAL PAIN: 0
ABDOMINAL DISTENTION: 0
COUGH: 0
NAUSEA: 1
WHEEZING: 0
CHEST TIGHTNESS: 0
BACK PAIN: 0
ANAL BLEEDING: 0
VOMITING: 0
TROUBLE SWALLOWING: 0
CONSTIPATION: 0
DIARRHEA: 0
RECTAL PAIN: 0
CHOKING: 0
SHORTNESS OF BREATH: 0
BLOOD IN STOOL: 0
SINUS PAIN: 0
EYE DISCHARGE: 0
COLOR CHANGE: 0
EYE PAIN: 0
FACIAL SWELLING: 0
STRIDOR: 0
APNEA: 0

## 2024-09-03 NOTE — PATIENT INSTRUCTIONS
Return in about 6 months (around 3/3/2025).   Orders Placed This Encounter    CT CHEST W CONTRAST     Standing Status:   Future     Standing Expiration Date:   9/3/2025     Order Specific Question:   STAT Creatinine as needed:     Answer:   Yes     Order Specific Question:   Reason for exam:     Answer:   Follow-up of follicular lymphoma    CT ABDOMEN PELVIS W IV CONTRAST Additional Contrast? None     Standing Status:   Future     Standing Expiration Date:   9/3/2025     Order Specific Question:   Additional Contrast?     Answer:   None     Order Specific Question:   STAT Creatinine as needed:     Answer:   Yes    CBC with Auto Differential     Standing Status:   Future     Standing Expiration Date:   9/3/2025    Lactate Dehydrogenase     Standing Status:   Future     Standing Expiration Date:   9/3/2025     Medications Discontinued During This Encounter   Medication Reason    prochlorperazine (COMPAZINE) 10 MG tablet LIST CLEANUP    tadalafil (CIALIS) 5 MG tablet LIST CLEANUP    oxyBUTYnin (DITROPAN) 5 MG tablet LIST CLEANUP

## 2024-09-03 NOTE — PROGRESS NOTES
[facility] [place] [facility]  [facility]  [address]  [address]  [facility] [address]  Dept: [phone]  Loc: [phone]   Hematology/Oncology Progress Note (Clinic)        Nabil Kate  1960    9/3/2024     No ref. provider found   Nilsa Hartman MD     Diagnosis:   1.  Follicular lymphoma, stage III (biopsy October 15th, 2019), status post Bendamustine and Rituxan    2.  Adenocarcinoma of the lung, status post right upper and right middle lobe bilobed lobectomy on September 30, 2020.Positive for visceral pleural invasion, status post adjuvant chemotherapy treatment with cisplatin and Alimta    3.  Chronic anemia      Treatment:   1.  6 cycles of Bendamustine and Rituxan completed for follicular lymphoma entheses original diagnosis October 15, 2019)    2.  Right upper and right middle lobe bilobed lobectomy on September 30, 2020 (positive for visceral pleural invasion) status postchemotherapy with cisplatin and Alimta          Followable Disease:   CT of chest abdomen and pelvis with contrast on May 23, 2023:  Impression:   No evidence of local tumor recurrence or metastatic disease in the chest, abdomen, or pelvis.      Comorbidities:  Acid reflux, coronary artery disease, reduced ejection fraction at less than 50%, arthritis, type 2 diabetes mellitus without complication, previous esophageal dilatation.      Subjective: The patient returns today in continued follow-up of follicular lymphoma, and also adenocarcinoma of the lung.    He recently had multiple prostate biopsies that were negative for malignancy.  He continues to have problems with difficulty with urinating, especially nocturnally, and frequent urination throughout the day.  He reports that with his negative prostate biopsies he plans to proceed with laser therapy for prostatic hypertrophy.    Otherwise he has a negative review of systems.  He is retired from his job as a , and now maintains a

## 2025-03-03 ENCOUNTER — TELEMEDICINE (OUTPATIENT)
Dept: ONCOLOGY | Age: 65
End: 2025-03-03
Payer: MEDICARE

## 2025-03-03 ENCOUNTER — HOSPITAL ENCOUNTER (OUTPATIENT)
Dept: INFUSION THERAPY | Age: 65
Discharge: HOME | End: 2025-03-03
Payer: MEDICARE

## 2025-03-03 VITALS
RESPIRATION RATE: 16 BRPM | OXYGEN SATURATION: 95 % | HEART RATE: 80 BPM | BODY MASS INDEX: 37.25 KG/M2 | WEIGHT: 275 LBS | HEIGHT: 72 IN | SYSTOLIC BLOOD PRESSURE: 144 MMHG | DIASTOLIC BLOOD PRESSURE: 68 MMHG | TEMPERATURE: 97.6 F

## 2025-03-03 VITALS
RESPIRATION RATE: 16 BRPM | TEMPERATURE: 97.6 F | SYSTOLIC BLOOD PRESSURE: 144 MMHG | DIASTOLIC BLOOD PRESSURE: 68 MMHG | HEART RATE: 80 BPM

## 2025-03-03 DIAGNOSIS — C82.58 DIFFUSE FOLLICLE CENTER LYMPHOMA OF LYMPH NODES OF MULTIPLE REGIONS (HCC): ICD-10-CM

## 2025-03-03 DIAGNOSIS — C82.90 FOLLICULAR LYMPHOMA, UNSPECIFIED FOLLICULAR LYMPHOMA TYPE, UNSPECIFIED BODY REGION (HCC): ICD-10-CM

## 2025-03-03 DIAGNOSIS — C34.91 ADENOCARCINOMA, LUNG, RIGHT (HCC): Primary | ICD-10-CM

## 2025-03-03 DIAGNOSIS — C82.90 FOLLICULAR LYMPHOMA, UNSPECIFIED FOLLICULAR LYMPHOMA TYPE, UNSPECIFIED BODY REGION (HCC): Primary | ICD-10-CM

## 2025-03-03 LAB
ALBUMIN SERPL BCG-MCNC: 4.3 G/DL (ref 3.4–4.9)
ALP SERPL-CCNC: 76 U/L (ref 40–129)
ALT SERPL W/O P-5'-P-CCNC: 15 U/L (ref 10–50)
AST SERPL-CCNC: 23 U/L (ref 10–50)
BASOPHILS # BLD AUTO: 0.1 THOU/MM3 (ref 0–0.1)
BASOPHILS NFR BLD AUTO: 1 % (ref 0–3)
BILIRUB CONJ SERPL-MCNC: 0.3 MG/DL (ref 0–0.2)
BILIRUB SERPL-MCNC: 0.7 MG/DL (ref 0.3–1.2)
BUN BLDP-MCNC: 25 MG/DL (ref 8–26)
CHLORIDE BLD-SCNC: 103 MEQ/L (ref 98–109)
CREAT BLD-MCNC: 1.3 MG/DL (ref 0.5–1.2)
EOSINOPHIL # BLD AUTO: 0.4 THOU/MM3 (ref 0–0.4)
EOSINOPHIL NFR BLD AUTO: 5 % (ref 0–4)
ERYTHROCYTE [DISTWIDTH] IN BLOOD BY AUTOMATED COUNT: 17.6 % (ref 11.5–14.5)
GFR SERPL CREATININE-BSD FRML MDRD: 60 ML/MIN/1.73M2
GLUCOSE BLD-MCNC: 133 MG/DL (ref 70–108)
HCT VFR BLD AUTO: 41.7 % (ref 42–52)
HGB BLD-MCNC: 12.8 GM/DL (ref 14–18)
IMM GRANULOCYTES # BLD AUTO: 0.01 THOU/MM3 (ref 0–0.07)
IMM GRANULOCYTES NFR BLD AUTO: 0 %
IONIZED CALCIUM, WHOLE BLOOD: 1.31 MMOL/L (ref 1.12–1.32)
LDH SERPL L TO P-CCNC: 145 U/L (ref 135–225)
LYMPHOCYTES # BLD AUTO: 2.2 THOU/MM3 (ref 1–4.8)
LYMPHOCYTES NFR BLD AUTO: 29 % (ref 15–47)
MCH RBC QN AUTO: 24.3 PG (ref 26–33)
MCHC RBC AUTO-ENTMCNC: 30.7 GM/DL (ref 32.2–35.5)
MCV RBC AUTO: 79 FL (ref 80–94)
MONOCYTES # BLD AUTO: 0.5 THOU/MM3 (ref 0.4–1.3)
MONOCYTES NFR BLD AUTO: 7 % (ref 0–12)
NEUTROPHILS ABSOLUTE: 4.3 THOU/MM3 (ref 1.8–7.7)
NEUTROPHILS NFR BLD AUTO: 59 % (ref 43–75)
PLATELET # BLD AUTO: 254 THOU/MM3 (ref 130–400)
PMV BLD AUTO: 9.6 FL (ref 9.4–12.4)
POTASSIUM BLD-SCNC: 4.7 MEQ/L (ref 3.5–4.9)
PROT SERPL-MCNC: 7.2 G/DL (ref 6.4–8.3)
RBC # BLD AUTO: 5.26 MILL/MM3 (ref 4.7–6.1)
SODIUM BLD-SCNC: 142 MEQ/L (ref 138–146)
TOTAL CO2, WHOLE BLOOD: 33 MEQ/L (ref 23–33)
WBC # BLD AUTO: 7.4 THOU/MM3 (ref 4.8–10.8)

## 2025-03-03 PROCEDURE — 99214 OFFICE O/P EST MOD 30 MIN: CPT | Performed by: STUDENT IN AN ORGANIZED HEALTH CARE EDUCATION/TRAINING PROGRAM

## 2025-03-03 PROCEDURE — 1159F MED LIST DOCD IN RCRD: CPT | Performed by: STUDENT IN AN ORGANIZED HEALTH CARE EDUCATION/TRAINING PROGRAM

## 2025-03-03 PROCEDURE — G8417 CALC BMI ABV UP PARAM F/U: HCPCS | Performed by: STUDENT IN AN ORGANIZED HEALTH CARE EDUCATION/TRAINING PROGRAM

## 2025-03-03 PROCEDURE — 1036F TOBACCO NON-USER: CPT | Performed by: STUDENT IN AN ORGANIZED HEALTH CARE EDUCATION/TRAINING PROGRAM

## 2025-03-03 PROCEDURE — 1126F AMNT PAIN NOTED NONE PRSNT: CPT | Performed by: STUDENT IN AN ORGANIZED HEALTH CARE EDUCATION/TRAINING PROGRAM

## 2025-03-03 PROCEDURE — 80076 HEPATIC FUNCTION PANEL: CPT

## 2025-03-03 PROCEDURE — 99211 OFF/OP EST MAY X REQ PHY/QHP: CPT

## 2025-03-03 PROCEDURE — 3017F COLORECTAL CA SCREEN DOC REV: CPT | Performed by: STUDENT IN AN ORGANIZED HEALTH CARE EDUCATION/TRAINING PROGRAM

## 2025-03-03 PROCEDURE — 83615 LACTATE (LD) (LDH) ENZYME: CPT

## 2025-03-03 PROCEDURE — 1123F ACP DISCUSS/DSCN MKR DOCD: CPT | Performed by: STUDENT IN AN ORGANIZED HEALTH CARE EDUCATION/TRAINING PROGRAM

## 2025-03-03 PROCEDURE — 85025 COMPLETE CBC W/AUTO DIFF WBC: CPT

## 2025-03-03 PROCEDURE — 80047 BASIC METABLC PNL IONIZED CA: CPT

## 2025-03-03 PROCEDURE — G8427 DOCREV CUR MEDS BY ELIG CLIN: HCPCS | Performed by: STUDENT IN AN ORGANIZED HEALTH CARE EDUCATION/TRAINING PROGRAM

## 2025-03-03 RX ORDER — ONDANSETRON 4 MG/1
4 TABLET, ORALLY DISINTEGRATING ORAL 3 TIMES DAILY PRN
Qty: 60 TABLET | Refills: 0 | Status: SHIPPED | OUTPATIENT
Start: 2025-03-03

## 2025-03-03 ASSESSMENT — ENCOUNTER SYMPTOMS
CHEST TIGHTNESS: 0
CONSTIPATION: 0
SHORTNESS OF BREATH: 0
SINUS PAIN: 0
EYE DISCHARGE: 0
EYE PAIN: 0
ANAL BLEEDING: 0
STRIDOR: 0
BACK PAIN: 0
ABDOMINAL DISTENTION: 0
FACIAL SWELLING: 0
VOMITING: 0
ABDOMINAL PAIN: 0
TROUBLE SWALLOWING: 0
DIARRHEA: 0
COUGH: 0
APNEA: 0
CHOKING: 0
NAUSEA: 1
RECTAL PAIN: 0
COLOR CHANGE: 0
WHEEZING: 0
BLOOD IN STOOL: 0

## 2025-03-03 NOTE — PROGRESS NOTES
for this encounter: 30 minutes    --Day Banda MD on 3/3/2025 at 3:55 PM    An electronic signature was used to authenticate this note.

## (undated) DEVICE — RELOAD STPL L45MM EXTRA THCK REINF FOR SIGNIA STPLR

## (undated) DEVICE — SUTURE ETHLN SZ 3-0 L30IN NONABSORBABLE BLK FSL L30MM 3/8 1671H

## (undated) DEVICE — GLOVE ORANGE PI 7 1/2   MSG9075

## (undated) DEVICE — SYSTEM SKIN CLSR 22CM DERMBND PRINEO

## (undated) DEVICE — SUTURE ETHLN SZ 3-0 L18IN NONABSORBABLE BLK FS-1 L24MM 3/8 663H

## (undated) DEVICE — TUBING, SUCTION, 1/4" X 20', STRAIGHT: Brand: MEDLINE INDUSTRIES, INC.

## (undated) DEVICE — SPONGE DRN W4XL4IN RAYON/POLYESTER 6 PLY NONWOVEN PRECUT

## (undated) DEVICE — GOWN,SIRUS,NON REINFRCD,LARGE,SET IN SL: Brand: MEDLINE

## (undated) DEVICE — THORACIC CATHETER,STRAIGHT: Brand: ARGYLE

## (undated) DEVICE — YANKAUER,BULB TIP,W/O VENT,RIGID,STERILE: Brand: MEDLINE

## (undated) DEVICE — Z DISCONTINUED NO SUB IDED TAPE UMB W1/8XL36IN WHT COT STRND NONRADIOPAQUE

## (undated) DEVICE — SPONGE LAP W18XL18IN WHT COT 4 PLY FLD STRUNG RADPQ DISP ST

## (undated) DEVICE — ELECTRODE PT RET AD L9FT HI MOIST COND ADH HYDRGEL CORDED

## (undated) DEVICE — PACK PROCEDURE SURG SET UP SRMC

## (undated) DEVICE — GLOVE ORANGE PI 7   MSG9070

## (undated) DEVICE — SPONGE,PEANUT,XRAY,ST,SM,3/8",5/CARD: Brand: MEDLINE INDUSTRIES, INC.

## (undated) DEVICE — SUTURE SOFSILK SZ 2 L30IN NONABSORBABLE WHT V-26 L37MM 1/2 CS746

## (undated) DEVICE — DRAPE,INSTRUMENT,MAGNETIC,10X16: Brand: MEDLINE

## (undated) DEVICE — CONTAINER,SPECIMEN,PNEU TUBE,4OZ,OR STRL: Brand: MEDLINE

## (undated) DEVICE — COVER,LIGHT HANDLE,FLX,2/PK: Brand: MEDLINE INDUSTRIES, INC.

## (undated) DEVICE — TUBING PRSS 36 M F

## (undated) DEVICE — RELOAD STPL 45MM THCK TISS GRN W/ GRIPPING SURF TECHNOLOGY

## (undated) DEVICE — TAPE ADH W2INXL10YD PLAS TRNSPAR H2O RESIST HYPOALRG CURAD

## (undated) DEVICE — LOOP VES W13MM THK09MM MINI RED SIL FLD REPELLENT

## (undated) DEVICE — SUTURE MCRYL SZ 3-0 L27IN ABSRB UD L24MM PS-1 3/8 CIR PRIM Y936H

## (undated) DEVICE — STOCKINETTE ORTH W9XL36IN COT 2 PLY HLLW FOR HANDLING LMB

## (undated) DEVICE — TOTAL TRAY, DB, 100% SILI FOLEY, 16FR 10: Brand: MEDLINE

## (undated) DEVICE — APPLIER LIG CLP M L11IN TI STR RNG HNDL FOR 20 CLP DISP

## (undated) DEVICE — DRESSING TRNSPAR W5XL4.5IN FLM SHT SEMIPERMEABLE WIND

## (undated) DEVICE — BLADE ES L6IN ELASTOMERIC COAT EXT DURABLE BEND UPTO 90DEG

## (undated) DEVICE — PAD GEN USE BORDERED ADH 14IN 2IN AND 12IN 4IN GZ UNIV ST

## (undated) DEVICE — SPONGE GZ W4XL4IN COT 12 PLY TYP VII WVN C FLD DSGN

## (undated) DEVICE — RELOAD STPL 45MM THN VASC TISS WHT W/ GRIPPING SURF

## (undated) DEVICE — GAUZE,SPONGE,8"X4",12PLY,XRAY,STRL,LF: Brand: MEDLINE

## (undated) DEVICE — AIRWAY PHGEAL AD 100MM PNK M WLLMS

## (undated) DEVICE — INTENDED FOR TISSUE SEPARATION, AND OTHER PROCEDURES THAT REQUIRE A SHARP SURGICAL BLADE TO PUNCTURE OR CUT.: Brand: BARD-PARKER ® CARBON RIB-BACK BLADES

## (undated) DEVICE — STAPLER INT L16CM STD UNIV RELD DISP TRI-STAPLE ENDO GIA

## (undated) DEVICE — LINER SUCT CANSTR 1500CC SEMI RIG W/ POR HYDROPHOBIC SHUT

## (undated) DEVICE — GOWN,SIRUS,NONRNF,SETINSLV,XL,20/CS: Brand: MEDLINE

## (undated) DEVICE — PACK-MAJOR

## (undated) DEVICE — 3M™ IOBAN™ 2 ANTIMICROBIAL INCISE DRAPE 6650EZ: Brand: IOBAN™ 2

## (undated) DEVICE — SUTURE VCRL SZ 2-0 L36IN ABSRB UD L40MM CT 1/2 CIR J957H

## (undated) DEVICE — CONNECTOR PERF 3/8X3/8X3/8IN EQL WYE

## (undated) DEVICE — DRAIN SURG SGL COLL PT TB FOR ATS BG OASIS

## (undated) DEVICE — BASIC SINGLE BASIN BTC-LF: Brand: MEDLINE INDUSTRIES, INC.

## (undated) DEVICE — STRIP,CLOSURE,WOUND,MEDI-STRIP,1/2X4: Brand: MEDLINE

## (undated) DEVICE — APPLICATOR MEDICATED 26 CC SOLUTION HI LT ORNG CHLORAPREP

## (undated) DEVICE — RELOAD STPL 3.5MM L60MM 0DEG UNIV TISS PUR TI 6 ROW LIN

## (undated) DEVICE — GLOVE ORANGE PI 8   MSG9080

## (undated) DEVICE — GLOVE SURG SZ 65 THK91MIL LTX FREE SYN POLYISOPRENE

## (undated) DEVICE — GAUZE,SPONGE,4"X4",12PLY,STERILE,LF,2'S: Brand: MEDLINE

## (undated) DEVICE — THORACIC CATHETER,RIGHT ANGLE: Brand: ARGYLE